# Patient Record
Sex: MALE | Race: WHITE | NOT HISPANIC OR LATINO | Employment: OTHER | URBAN - NONMETROPOLITAN AREA
[De-identification: names, ages, dates, MRNs, and addresses within clinical notes are randomized per-mention and may not be internally consistent; named-entity substitution may affect disease eponyms.]

---

## 2017-02-24 ENCOUNTER — APPOINTMENT (OUTPATIENT)
Dept: GENERAL RADIOLOGY | Facility: HOSPITAL | Age: 42
End: 2017-02-24

## 2017-02-24 ENCOUNTER — HOSPITAL ENCOUNTER (EMERGENCY)
Facility: HOSPITAL | Age: 42
Discharge: HOME | End: 2017-02-24
Attending: STUDENT IN AN ORGANIZED HEALTH CARE EDUCATION/TRAINING PROGRAM | Admitting: STUDENT IN AN ORGANIZED HEALTH CARE EDUCATION/TRAINING PROGRAM

## 2017-02-24 VITALS
DIASTOLIC BLOOD PRESSURE: 93 MMHG | HEART RATE: 75 BPM | OXYGEN SATURATION: 95 % | RESPIRATION RATE: 18 BRPM | SYSTOLIC BLOOD PRESSURE: 146 MMHG | WEIGHT: 200 LBS | TEMPERATURE: 98.4 F | BODY MASS INDEX: 32.14 KG/M2 | HEIGHT: 66 IN

## 2017-02-24 DIAGNOSIS — G57.93 NEUROPATHIC PAIN OF BOTH FEET: Primary | ICD-10-CM

## 2017-02-24 LAB
ALBUMIN SERPL-MCNC: 4.5 G/DL (ref 3.5–5)
ALBUMIN/GLOB SERPL: 1.4 G/DL (ref 1–2)
ALP SERPL-CCNC: 70 U/L (ref 38–126)
ALT SERPL W P-5'-P-CCNC: 33 U/L (ref 13–69)
ANION GAP SERPL CALCULATED.3IONS-SCNC: 15.1 MMOL/L
AST SERPL-CCNC: 20 U/L (ref 15–46)
BASOPHILS # BLD AUTO: 0.05 10*3/MM3 (ref 0–0.2)
BASOPHILS NFR BLD AUTO: 0.4 % (ref 0–2.5)
BILIRUB SERPL-MCNC: 0.5 MG/DL (ref 0.2–1.3)
BUN BLD-MCNC: 21 MG/DL (ref 7–20)
BUN/CREAT SERPL: 19.1 (ref 6.3–21.9)
CALCIUM SPEC-SCNC: 9.4 MG/DL (ref 8.4–10.2)
CHLORIDE SERPL-SCNC: 107 MMOL/L (ref 98–107)
CO2 SERPL-SCNC: 24 MMOL/L (ref 26–30)
CREAT BLD-MCNC: 1.1 MG/DL (ref 0.6–1.3)
DEPRECATED RDW RBC AUTO: 44.7 FL (ref 37–54)
EOSINOPHIL # BLD AUTO: 0.28 10*3/MM3 (ref 0–0.7)
EOSINOPHIL NFR BLD AUTO: 2.3 % (ref 0–7)
ERYTHROCYTE [DISTWIDTH] IN BLOOD BY AUTOMATED COUNT: 14.5 % (ref 11.5–14.5)
GFR SERPL CREATININE-BSD FRML MDRD: 74 ML/MIN/1.73
GLOBULIN UR ELPH-MCNC: 3.2 GM/DL
GLUCOSE BLD-MCNC: 104 MG/DL (ref 74–98)
HCT VFR BLD AUTO: 51.8 % (ref 42–52)
HGB BLD-MCNC: 17.9 G/DL (ref 14–18)
IMM GRANULOCYTES # BLD: 0.08 10*3/MM3 (ref 0–0.06)
IMM GRANULOCYTES NFR BLD: 0.7 % (ref 0–0.6)
LYMPHOCYTES # BLD AUTO: 1.79 10*3/MM3 (ref 0.6–3.4)
LYMPHOCYTES NFR BLD AUTO: 14.9 % (ref 10–50)
MCH RBC QN AUTO: 29.4 PG (ref 27–31)
MCHC RBC AUTO-ENTMCNC: 34.6 G/DL (ref 30–37)
MCV RBC AUTO: 85.2 FL (ref 80–94)
MONOCYTES # BLD AUTO: 0.65 10*3/MM3 (ref 0–0.9)
MONOCYTES NFR BLD AUTO: 5.4 % (ref 0–12)
NEUTROPHILS # BLD AUTO: 9.15 10*3/MM3 (ref 2–6.9)
NEUTROPHILS NFR BLD AUTO: 76.3 % (ref 37–80)
NRBC BLD MANUAL-RTO: 0 /100 WBC (ref 0–0)
PLATELET # BLD AUTO: 187 10*3/MM3 (ref 130–400)
PMV BLD AUTO: 9.7 FL (ref 6–12)
POTASSIUM BLD-SCNC: 4.1 MMOL/L (ref 3.5–5.1)
PROT SERPL-MCNC: 7.7 G/DL (ref 6.3–8.2)
RBC # BLD AUTO: 6.08 10*6/MM3 (ref 4.7–6.1)
SODIUM BLD-SCNC: 142 MMOL/L (ref 137–145)
WBC NRBC COR # BLD: 12 10*3/MM3 (ref 4.8–10.8)

## 2017-02-24 PROCEDURE — 71020 HC CHEST PA AND LATERAL: CPT

## 2017-02-24 PROCEDURE — 85025 COMPLETE CBC W/AUTO DIFF WBC: CPT | Performed by: PHYSICIAN ASSISTANT

## 2017-02-24 PROCEDURE — 73630 X-RAY EXAM OF FOOT: CPT

## 2017-02-24 PROCEDURE — 80053 COMPREHEN METABOLIC PANEL: CPT | Performed by: PHYSICIAN ASSISTANT

## 2017-02-24 PROCEDURE — 99284 EMERGENCY DEPT VISIT MOD MDM: CPT

## 2017-02-24 RX ORDER — CARVEDILOL 25 MG/1
25 TABLET ORAL 2 TIMES DAILY WITH MEALS
COMMUNITY
End: 2017-04-17 | Stop reason: SDUPTHER

## 2017-02-24 RX ORDER — HYDROCODONE BITARTRATE AND ACETAMINOPHEN 7.5; 325 MG/1; MG/1
1 TABLET ORAL ONCE
Status: COMPLETED | OUTPATIENT
Start: 2017-02-24 | End: 2017-02-24

## 2017-02-24 RX ORDER — AMLODIPINE BESYLATE 10 MG/1
10 TABLET ORAL DAILY
COMMUNITY
End: 2017-04-17 | Stop reason: SDUPTHER

## 2017-02-24 RX ORDER — ONDANSETRON 4 MG/1
4 TABLET, ORALLY DISINTEGRATING ORAL ONCE
Status: COMPLETED | OUTPATIENT
Start: 2017-02-24 | End: 2017-02-24

## 2017-02-24 RX ORDER — CYCLOBENZAPRINE HCL 5 MG
5 TABLET ORAL NIGHTLY PRN
Qty: 7 TABLET | Refills: 0 | Status: SHIPPED | OUTPATIENT
Start: 2017-02-24 | End: 2017-10-02 | Stop reason: HOSPADM

## 2017-02-24 RX ORDER — HYDROCHLOROTHIAZIDE 12.5 MG/1
12.5 TABLET ORAL 2 TIMES DAILY
COMMUNITY
End: 2017-03-02

## 2017-02-24 RX ORDER — CLONIDINE HYDROCHLORIDE 0.1 MG/1
0.1 TABLET ORAL ONCE
Status: COMPLETED | OUTPATIENT
Start: 2017-02-24 | End: 2017-02-24

## 2017-02-24 RX ORDER — LISINOPRIL 20 MG/1
20 TABLET ORAL DAILY
COMMUNITY
End: 2017-04-17 | Stop reason: SDUPTHER

## 2017-02-24 RX ORDER — SENNOSIDES 8.6 MG
650 CAPSULE ORAL EVERY 8 HOURS PRN
Qty: 12 TABLET | Refills: 0 | Status: SHIPPED | OUTPATIENT
Start: 2017-02-24 | End: 2017-10-02

## 2017-02-24 RX ADMIN — HYDROCODONE BITARTRATE AND ACETAMINOPHEN 1 TABLET: 7.5; 325 TABLET ORAL at 10:36

## 2017-02-24 RX ADMIN — CLONIDINE HYDROCHLORIDE 0.1 MG: 0.1 TABLET ORAL at 11:24

## 2017-02-24 RX ADMIN — ONDANSETRON 4 MG: 4 TABLET, ORALLY DISINTEGRATING ORAL at 10:36

## 2017-03-02 ENCOUNTER — OFFICE VISIT (OUTPATIENT)
Dept: INTERNAL MEDICINE | Facility: CLINIC | Age: 42
End: 2017-03-02

## 2017-03-02 VITALS
HEART RATE: 71 BPM | TEMPERATURE: 97.6 F | BODY MASS INDEX: 33.11 KG/M2 | OXYGEN SATURATION: 97 % | WEIGHT: 206 LBS | HEIGHT: 66 IN | DIASTOLIC BLOOD PRESSURE: 98 MMHG | SYSTOLIC BLOOD PRESSURE: 142 MMHG

## 2017-03-02 DIAGNOSIS — R73.9 HYPERGLYCEMIA: ICD-10-CM

## 2017-03-02 DIAGNOSIS — R53.83 OTHER FATIGUE: ICD-10-CM

## 2017-03-02 DIAGNOSIS — Z11.4 ENCOUNTER FOR SCREENING FOR HIV: ICD-10-CM

## 2017-03-02 DIAGNOSIS — G62.9 NEUROPATHY: ICD-10-CM

## 2017-03-02 DIAGNOSIS — F41.9 ANXIETY DISORDER, UNSPECIFIED TYPE: ICD-10-CM

## 2017-03-02 DIAGNOSIS — H53.9 VISION DISTURBANCE: Primary | ICD-10-CM

## 2017-03-02 DIAGNOSIS — M79.2 NEUROPATHIC PAIN: ICD-10-CM

## 2017-03-02 DIAGNOSIS — R79.89 ABNORMAL CBC: ICD-10-CM

## 2017-03-02 DIAGNOSIS — Z92.89 HISTORY OF TRANSFUSION: ICD-10-CM

## 2017-03-02 DIAGNOSIS — G89.4 CHRONIC PAIN SYNDROME: ICD-10-CM

## 2017-03-02 PROCEDURE — 36415 COLL VENOUS BLD VENIPUNCTURE: CPT | Performed by: FAMILY MEDICINE

## 2017-03-02 PROCEDURE — 99204 OFFICE O/P NEW MOD 45 MIN: CPT | Performed by: FAMILY MEDICINE

## 2017-03-02 RX ORDER — HYDROCHLOROTHIAZIDE 25 MG/1
25 TABLET ORAL DAILY
COMMUNITY
End: 2017-12-21

## 2017-03-02 RX ORDER — LISINOPRIL 40 MG/1
TABLET ORAL
COMMUNITY
Start: 2016-12-21 | End: 2017-04-17

## 2017-03-02 NOTE — PROGRESS NOTES
"Subjective   Tesfaye Ang is a 41 y.o. male.     History of Present Illness   Patient went to ER last week due to swelling in feet. Pain started one week ago on Thursday. By , he got up from his desk and burst into chairs as it felt like pins, needles, razor blades. ER said he's devleoping neuropathy but not diabetic. Has had swelling in right foot for a year, goes down overnight. Left started swelling a month ago. He does not think he's been checked for a clot.  No known history of vitamin B12 deficiency.  He has a long-term smoker, has smoked for 26 years currently smoking half pack per day.  He would like to quit but he finds it helps with his anxiety.  Patient has undergone blood transfusion in the past.    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  Had many hernia surgeries to his abdomen.    Patient wants to be honest and upfront and tells me he's been kicked out of 2 pain clinics.  He suffers from chronic pain in his lower back as well as his abdomen.  He was kicked out of one clinic as he was confused on what today's appointment was, he thought it was on the second and it was on the first.  His other pain clinic he was discharged from as he could not come in for a pill count.  He was in [place] for a .  He is interested in getting another referral for his chronic pain.  He also states that he has \"done every drug under the sun\" but no longer abuses drugs other than an occasional marijuana use.  Marijuana does seem to help with his pain.    Strong family history of coronary artery disease and peripheral vascular disease.  Dad  at age 39 from a heart attack, mother  at 56.  Mother also had a stroke while she was pregnant with patient.  Patient reports his first heart attack was at age 29.    The following portions of the patient's " history were reviewed and updated as appropriate: allergies, current medications, past family history, past medical history, past social history, past surgical history and problem list.    Review of Systems   Eyes: Positive for visual disturbance (due for an exam and he asked that I get this set up).   Respiratory: Positive for cough and wheezing. Negative for shortness of breath.    Cardiovascular: Positive for leg swelling (swollen ankles).   Gastrointestinal: Positive for abdominal pain and nausea.   Musculoskeletal: Positive for arthralgias, gait problem (uses cane) and joint swelling. Negative for myalgias.        Chronic pain   Psychiatric/Behavioral: The patient is nervous/anxious.        Objective   Physical Exam   Constitutional: He is oriented to person, place, and time. Vital signs are normal. He appears well-developed and well-nourished. He is active. He does not have a sickly appearance. He does not appear ill.   Appears stated age. Well groomed. Obese     HENT:   Head: Normocephalic and atraumatic. 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   Right Ear: Hearing normal.   Left Ear: Hearing normal.   Nose: Nose normal.   Mouth/Throat: Abnormal dentition.   Eyes: EOM and lids are normal. Pupils are equal, round, and reactive to light. No scleral icterus.   Neck: Phonation normal. Neck supple.   Cardiovascular: Normal rate, regular rhythm and normal heart sounds.    Pulmonary/Chest: Effort normal and breath sounds normal.   Abdominal: Soft. Bowel sounds are normal. There is generalized tenderness. There is no rigidity and no rebound.       Neurological: He is alert and oriented to person, place, and time. He displays no tremor. No cranial nerve deficit. Gait (walks with cane) abnormal.   Skin: Skin is warm. He is not diaphoretic. No cyanosis. Nails show no clubbing.   Psychiatric: He has a normal mood and affect. His speech is normal and behavior is normal. Judgment and thought content normal. Cognition and  memory are normal.   Nursing note and vitals reviewed.       Admission on 02/24/2017, Discharged on 02/24/2017   Component Date Value Ref Range Status   • Glucose 02/24/2017 104* 74 - 98 mg/dL Final   • BUN 02/24/2017 21* 7 - 20 mg/dL Final   • Creatinine 02/24/2017 1.10  0.60 - 1.30 mg/dL Final   • Sodium 02/24/2017 142  137 - 145 mmol/L Final   • Potassium 02/24/2017 4.1  3.5 - 5.1 mmol/L Final   • Chloride 02/24/2017 107  98 - 107 mmol/L Final   • CO2 02/24/2017 24.0* 26.0 - 30.0 mmol/L Final   • Calcium 02/24/2017 9.4  8.4 - 10.2 mg/dL Final   • Total Protein 02/24/2017 7.7  6.3 - 8.2 g/dL Final   • Albumin 02/24/2017 4.50  3.50 - 5.00 g/dL Final   • ALT (SGPT) 02/24/2017 33  13 - 69 U/L Final   • AST (SGOT) 02/24/2017 20  15 - 46 U/L Final   • Alkaline Phosphatase 02/24/2017 70  38 - 126 U/L Final   • Total Bilirubin 02/24/2017 0.5  0.2 - 1.3 mg/dL Final   • eGFR Non  Amer 02/24/2017 74  >60 mL/min/1.73 Final   • Globulin 02/24/2017 3.2  gm/dL Final   • A/G Ratio 02/24/2017 1.4  1.0 - 2.0 g/dL Final   • BUN/Creatinine Ratio 02/24/2017 19.1  6.3 - 21.9 Final   • Anion Gap 02/24/2017 15.1  mmol/L Final   • WBC 02/24/2017 12.00* 4.80 - 10.80 10*3/mm3 Final   • RBC 02/24/2017 6.08  4.70 - 6.10 10*6/mm3 Final   • Hemoglobin 02/24/2017 17.9  14.0 - 18.0 g/dL Final   • Hematocrit 02/24/2017 51.8  42.0 - 52.0 % Final   • MCV 02/24/2017 85.2  80.0 - 94.0 fL Final   • MCH 02/24/2017 29.4  27.0 - 31.0 pg Final   • MCHC 02/24/2017 34.6  30.0 - 37.0 g/dL Final   • RDW 02/24/2017 14.5  11.5 - 14.5 % Final   • RDW-SD 02/24/2017 44.7  37.0 - 54.0 fl Final   • MPV 02/24/2017 9.7  6.0 - 12.0 fL Final   • Platelets 02/24/2017 187  130 - 400 10*3/mm3 Final   • Neutrophil % 02/24/2017 76.3  37.0 - 80.0 % Final   • Lymphocyte % 02/24/2017 14.9  10.0 - 50.0 % Final   • Monocyte % 02/24/2017 5.4  0.0 - 12.0 % Final   • Eosinophil % 02/24/2017 2.3  0.0 - 7.0 % Final   • Basophil % 02/24/2017 0.4  0.0 - 2.5 % Final   • Immature  Grans % 02/24/2017 0.7* 0.0 - 0.6 % Final   • Neutrophils, Absolute 02/24/2017 9.15* 2.00 - 6.90 10*3/mm3 Final   • Lymphocytes, Absolute 02/24/2017 1.79  0.60 - 3.40 10*3/mm3 Final   • Monocytes, Absolute 02/24/2017 0.65  0.00 - 0.90 10*3/mm3 Final   • Eosinophils, Absolute 02/24/2017 0.28  0.00 - 0.70 10*3/mm3 Final   • Basophils, Absolute 02/24/2017 0.05  0.00 - 0.20 10*3/mm3 Final   • Immature Grans, Absolute 02/24/2017 0.08* 0.00 - 0.06 10*3/mm3 Final   • nRBC 02/24/2017 0.0  0.0 - 0.0 /100 WBC Final         Assessment/Plan   Tesfaye was seen today for establish care and foot swelling.    Diagnoses and all orders for this visit:    Vision disturbance  -     Ambulatory Referral to Optometry    Neuropathy  -     Comprehensive Metabolic Panel; Future  -     Hepatitis Panel, Acute; Future  -     TSH; Future  -     T4, Free; Future  -     Vitamin B12 & Folate; Future  -     HIV-1 / O / 2 Ag / Antibody 4th Generation; Future  -     RPR; Future  -     Protein Elec + Interp, Serum; Future  -     CONNER; Future  -     Sedimentation Rate; Future  -     Hemoglobin A1c; Future  -     Protein Elec + Interp, Serum  -     Comprehensive Metabolic Panel  -     Hepatitis Panel, Acute  -     TSH  -     T4, Free  -     Vitamin B12 & Folate  -     HIV-1 / O / 2 Ag / Antibody 4th Generation  -     RPR  -     CONNER  -     Sedimentation Rate  -     Hemoglobin A1c    Neuropathic pain    Abnormal CBC  -     CBC & Differential; Future  -     CBC & Differential    History of transfusion  -     Hepatitis Panel, Acute; Future  -     HIV-1 / O / 2 Ag / Antibody 4th Generation; Future  -     Hepatitis Panel, Acute  -     HIV-1 / O / 2 Ag / Antibody 4th Generation    Other fatigue   -     Hepatitis Panel, Acute; Future  -     Hepatitis Panel, Acute    Anxiety disorder, unspecified type   -     TSH; Future  -     T4, Free; Future  -     TSH  -     T4, Free    Encounter for screening for HIV   -     HIV-1 / O / 2 Ag / Antibody 4th Generation;  Future  -     HIV-1 / O / 2 Ag / Antibody 4th Generation    Hyperglycemia   -     Hemoglobin A1c; Future  -     Hemoglobin A1c    Chronic pain syndrome  -     Ambulatory Referral to Pain Management      Records requested from previous PCP. Explained I need to refer him to pain management for his pain control.  Can further discuss anxiety at a future visit.  Wellbutrin may be a good option as it may help with smoking cessation as well.  Patient is aware of HIV testing due to neuropathy.

## 2017-03-09 LAB
ALBUMIN SERPL ELPH-MCNC: 4.2 G/DL (ref 2.9–4.4)
ALBUMIN SERPL-MCNC: 4.8 G/DL (ref 3.2–4.8)
ALBUMIN/GLOB SERPL: 1.1 {RATIO} (ref 0.7–1.7)
ALBUMIN/GLOB SERPL: 1.5 G/DL (ref 1.5–2.5)
ALP SERPL-CCNC: 74 U/L (ref 25–100)
ALPHA1 GLOB SERPL ELPH-MCNC: 0.3 G/DL (ref 0–0.4)
ALPHA2 GLOB SERPL ELPH-MCNC: 1.1 G/DL (ref 0.4–1)
ALT SERPL-CCNC: 24 U/L (ref 7–40)
ANA SER QL: NEGATIVE
AST SERPL-CCNC: 17 U/L (ref 0–33)
B-GLOBULIN SERPL ELPH-MCNC: 1.3 G/DL (ref 0.7–1.3)
BASOPHILS # BLD AUTO: 0.03 10*3/MM3 (ref 0–0.2)
BASOPHILS NFR BLD AUTO: 0.3 % (ref 0–1)
BILIRUB SERPL-MCNC: 0.4 MG/DL (ref 0.3–1.2)
BUN SERPL-MCNC: 19 MG/DL (ref 9–23)
BUN/CREAT SERPL: 21.1 (ref 7–25)
CALCIUM SERPL-MCNC: 10.1 MG/DL (ref 8.7–10.4)
CHLORIDE SERPL-SCNC: 100 MMOL/L (ref 99–109)
CO2 SERPL-SCNC: 20 MMOL/L (ref 20–31)
CREAT SERPL-MCNC: 0.9 MG/DL (ref 0.6–1.3)
EOSINOPHIL # BLD AUTO: 0.27 10*3/MM3 (ref 0.1–0.3)
EOSINOPHIL NFR BLD AUTO: 2.7 % (ref 0–3)
ERYTHROCYTE [DISTWIDTH] IN BLOOD BY AUTOMATED COUNT: 15.3 % (ref 11.3–14.5)
ERYTHROCYTE [SEDIMENTATION RATE] IN BLOOD BY WESTERGREN METHOD: 27 MM/HR (ref 0–15)
FOLATE SERPL-MCNC: NORMAL NG/ML
GAMMA GLOB SERPL ELPH-MCNC: 1 G/DL (ref 0.4–1.8)
GLOBULIN SER CALC-MCNC: 3.1 GM/DL
GLOBULIN SER CALC-MCNC: 3.7 G/DL (ref 2.2–3.9)
GLUCOSE SERPL-MCNC: 76 MG/DL (ref 70–100)
HAV IGM SERPL QL IA: NEGATIVE
HBA1C MFR BLD: 5.3 % (ref 4.8–5.6)
HBV CORE IGM SERPL QL IA: NEGATIVE
HBV SURFACE AG SERPL QL IA: NEGATIVE
HCT VFR BLD AUTO: 57.7 % (ref 38.9–50.9)
HCV AB S/CO SERPL IA: 0.9 S/CO RATIO (ref 0–0.9)
HGB BLD-MCNC: 18.9 G/DL (ref 13.1–17.5)
HIV 1+2 AB+HIV1 P24 AG SERPL QL IA: NORMAL
IMM GRANULOCYTES # BLD: 0.07 10*3/MM3 (ref 0–0.03)
IMM GRANULOCYTES NFR BLD: 0.7 % (ref 0–0.6)
LYMPHOCYTES # BLD AUTO: 1.81 10*3/MM3 (ref 0.6–4.8)
LYMPHOCYTES NFR BLD AUTO: 18.4 % (ref 24–44)
Lab: ABNORMAL
M PROTEIN SERPL ELPH-MCNC: ABNORMAL G/DL
MCH RBC QN AUTO: 28.9 PG (ref 27–31)
MCHC RBC AUTO-ENTMCNC: 32.8 G/DL (ref 32–36)
MCV RBC AUTO: 88.4 FL (ref 80–99)
MONOCYTES # BLD AUTO: 0.87 10*3/MM3 (ref 0–1)
MONOCYTES NFR BLD AUTO: 8.8 % (ref 0–12)
NEUTROPHILS # BLD AUTO: 6.8 10*3/MM3 (ref 1.5–8.3)
NEUTROPHILS NFR BLD AUTO: 69.1 % (ref 41–71)
PLATELET # BLD AUTO: 223 10*3/MM3 (ref 150–450)
POTASSIUM SERPL-SCNC: 4.5 MMOL/L (ref 3.5–5.5)
PROT PATTERN SERPL ELPH-IMP: ABNORMAL
PROT SERPL-MCNC: 7.9 G/DL (ref 5.7–8.2)
RBC # BLD AUTO: 6.53 10*6/MM3 (ref 4.2–5.76)
REQUEST PROBLEM: NORMAL
REQUEST PROBLEM: NORMAL
RPR SER QL: NON REACTIVE
SODIUM SERPL-SCNC: 136 MMOL/L (ref 132–146)
T4 FREE SERPL-MCNC: 1.28 NG/DL (ref 0.89–1.76)
TSH SERPL DL<=0.005 MIU/L-ACNC: NORMAL MIU/ML
VIT B12 SERPL-MCNC: NORMAL PG/ML
WBC # BLD AUTO: 9.85 10*3/MM3 (ref 3.5–10.8)

## 2017-03-12 ENCOUNTER — HOSPITAL ENCOUNTER (EMERGENCY)
Facility: HOSPITAL | Age: 42
Discharge: HOME | End: 2017-03-12
Attending: EMERGENCY MEDICINE | Admitting: EMERGENCY MEDICINE

## 2017-03-12 VITALS
BODY MASS INDEX: 33.75 KG/M2 | OXYGEN SATURATION: 93 % | TEMPERATURE: 98.2 F | RESPIRATION RATE: 18 BRPM | SYSTOLIC BLOOD PRESSURE: 153 MMHG | WEIGHT: 210 LBS | HEIGHT: 66 IN | DIASTOLIC BLOOD PRESSURE: 101 MMHG | HEART RATE: 84 BPM

## 2017-03-12 DIAGNOSIS — L02.415 ABSCESS OF RIGHT LEG: Primary | ICD-10-CM

## 2017-03-12 PROCEDURE — 99283 EMERGENCY DEPT VISIT LOW MDM: CPT

## 2017-03-12 RX ORDER — LIDOCAINE HYDROCHLORIDE 20 MG/ML
10 INJECTION, SOLUTION INFILTRATION; PERINEURAL ONCE
Status: COMPLETED | OUTPATIENT
Start: 2017-03-12 | End: 2017-03-12

## 2017-03-12 RX ORDER — SULFAMETHOXAZOLE AND TRIMETHOPRIM 800; 160 MG/1; MG/1
1 TABLET ORAL 2 TIMES DAILY
Qty: 14 TABLET | Refills: 0 | Status: SHIPPED | OUTPATIENT
Start: 2017-03-12 | End: 2017-09-20 | Stop reason: HOSPADM

## 2017-03-12 RX ADMIN — LIDOCAINE HYDROCHLORIDE 10 ML: 20 INJECTION, SOLUTION INFILTRATION; PERINEURAL at 20:32

## 2017-03-13 NOTE — ED PROVIDER NOTES
Subjective   HPI Comments: Patient discovered a painful large pustule in the right inner thigh now draining purulent material.  Wife attempt to manipulate the lesion yesterday, making it worse today.  No fever or illness         History provided by:  Patient   used: No        Review of Systems   Constitutional: Negative.  Negative for diaphoresis and fever.   HENT: Negative for sore throat.    Eyes: Negative.  Negative for pain and visual disturbance.   Respiratory: Negative for cough, shortness of breath, wheezing and stridor.    Cardiovascular: Negative.  Negative for chest pain.   Gastrointestinal: Negative.  Negative for abdominal pain, diarrhea, nausea and vomiting.   Endocrine: Negative.    Genitourinary: Negative.  Negative for dysuria.   Musculoskeletal: Negative.  Negative for back pain and neck pain.   Skin: Positive for wound. Negative for pallor and rash.   Allergic/Immunologic: Negative.    Neurological: Negative.  Negative for syncope and headaches.   Hematological: Negative.    Psychiatric/Behavioral: Negative.  Negative for agitation.       Past Medical History   Diagnosis Date   • Arthritis    • CHF (congestive heart failure)    • Colon polyp    • Fractures    • Heart attack    • Heart disease    • Heart failure    • History of bilateral inguinal hernias    • Hx of transfusion of whole blood    • Hypertension    • Kidney stone    • Myocardial infarction    • Obesity    • Seizures    • Stroke      TIA       Allergies   Allergen Reactions   • Compazine [Prochlorperazine Edisylate] Anaphylaxis and Shortness Of Breath   • Gabapentin Other (See Comments)     seizures   • Toradol [Ketorolac Tromethamine] Hives and Shortness Of Breath   • Darvon [Propoxyphene] Other (See Comments) and Hives   • Vioxx [Rofecoxib] Other (See Comments)     Stomach bleeding  Stomach bleeding   • Butalbital-Aspirin-Caffeine Hives       Past Surgical History   Procedure Laterality Date   • Bowel resection      • Hernia repair  04/2016   • Cholecystectomy  12/2012   • Appendectomy  05/2012   • Revision / takedown colostomy         Family History   Problem Relation Age of Onset   • Heart attack Mother 56   • Hypertension Mother    • Migraines Mother    • Stroke Mother    • Heart attack Father 39   • Hypertension Father    • Arthritis Paternal Grandmother    • Arthritis Paternal Grandfather        Social History     Social History   • Marital status:      Spouse name: N/A   • Number of children: N/A   • Years of education: N/A     Social History Main Topics   • Smoking status: Current Every Day Smoker     Packs/day: 0.50     Types: Cigarettes   • Smokeless tobacco: None   • Alcohol use No      Comment: STOPPED 2001   • Drug use: No   • Sexual activity: Defer     Other Topics Concern   • None     Social History Narrative   • None           Objective   Physical Exam   Constitutional: He is oriented to person, place, and time. He appears well-developed.   HENT:   Head: Normocephalic.   Eyes: EOM are normal. Pupils are equal, round, and reactive to light.   Neck: Normal range of motion. Neck supple.   Cardiovascular: Normal rate and regular rhythm.    Pulmonary/Chest: Effort normal. He has no wheezes. He has no rales.   Abdominal: Soft. Bowel sounds are normal. He exhibits no distension. There is no rebound and no guarding.   Musculoskeletal: Normal range of motion. He exhibits no edema.   Neurological: He is alert and oriented to person, place, and time.   Skin: Skin is warm and dry.   Skin pustule on the right inner thigh with local tense nonfluctuant skin without induration.     Psychiatric: He has a normal mood and affect.   Nursing note and vitals reviewed.      Incision & Drainage  Date/Time: 3/12/2017 8:11 PM  Performed by: ALICIA PHAN  Authorized by: GEETA RAGSDALE     Consent:     Consent obtained:  Verbal    Consent given by:  Patient    Risks discussed:  Incomplete drainage  Location:     Type:   Abscess    Size:  1    Location:  Lower extremity (right thigh)  Pre-procedure details:     Skin preparation:  Chloraprep  Anesthesia (see MAR for exact dosages):     Anesthesia method:  Local infiltration  Procedure details:     Complexity:  Simple    Needle aspiration: no      Incision types:  Single straight    Incision depth:  Dermal    Scalpel blade:  11    Wound management:  Irrigated with saline    Drainage:  Bloody and purulent    Drainage amount:  Scant    Wound treatment:  Wound left open    Packing materials:  None  Post-procedure details:     Patient tolerance of procedure:  Tolerated well, no immediate complications             ED Course  ED Course                  MDM  Number of Diagnoses or Management Options  Abscess of right leg:       Final diagnoses:   Abscess of right leg            Mariaa Broussard, APRN  03/12/17 2231

## 2017-03-21 ENCOUNTER — RESULTS ENCOUNTER (OUTPATIENT)
Dept: INTERNAL MEDICINE | Facility: CLINIC | Age: 42
End: 2017-03-21

## 2017-03-21 DIAGNOSIS — Z92.89 HISTORY OF TRANSFUSION: ICD-10-CM

## 2017-03-21 DIAGNOSIS — Z11.4 ENCOUNTER FOR SCREENING FOR HIV: ICD-10-CM

## 2017-03-21 DIAGNOSIS — R53.83 OTHER FATIGUE: ICD-10-CM

## 2017-03-21 DIAGNOSIS — R79.89 ABNORMAL CBC: ICD-10-CM

## 2017-03-21 DIAGNOSIS — G62.9 NEUROPATHY: ICD-10-CM

## 2017-03-21 DIAGNOSIS — R79.89 ABNORMAL CBC: Primary | ICD-10-CM

## 2017-04-17 ENCOUNTER — TELEPHONE (OUTPATIENT)
Dept: INTERNAL MEDICINE | Facility: CLINIC | Age: 42
End: 2017-04-17

## 2017-04-17 RX ORDER — AMLODIPINE BESYLATE 10 MG/1
10 TABLET ORAL DAILY
Qty: 10 TABLET | Refills: 0 | Status: SHIPPED | OUTPATIENT
Start: 2017-04-17 | End: 2017-09-20 | Stop reason: HOSPADM

## 2017-04-17 RX ORDER — LISINOPRIL 20 MG/1
20 TABLET ORAL DAILY
Qty: 10 TABLET | Refills: 0 | Status: SHIPPED | OUTPATIENT
Start: 2017-04-17 | End: 2017-04-17 | Stop reason: SDUPTHER

## 2017-04-17 RX ORDER — CARVEDILOL 25 MG/1
25 TABLET ORAL 2 TIMES DAILY WITH MEALS
Qty: 60 TABLET | Refills: 11 | Status: SHIPPED | OUTPATIENT
Start: 2017-04-17 | End: 2017-09-20 | Stop reason: HOSPADM

## 2017-04-17 RX ORDER — LISINOPRIL 20 MG/1
20 TABLET ORAL DAILY
Qty: 30 TABLET | Refills: 11 | Status: SHIPPED | OUTPATIENT
Start: 2017-04-17 | End: 2017-09-20 | Stop reason: HOSPADM

## 2017-04-17 RX ORDER — CARVEDILOL 25 MG/1
25 TABLET ORAL 2 TIMES DAILY WITH MEALS
Qty: 20 TABLET | Refills: 0 | Status: SHIPPED | OUTPATIENT
Start: 2017-04-17 | End: 2017-04-17 | Stop reason: SDUPTHER

## 2017-04-17 RX ORDER — AMLODIPINE BESYLATE 10 MG/1
10 TABLET ORAL DAILY
Qty: 30 TABLET | Refills: 11 | Status: SHIPPED | OUTPATIENT
Start: 2017-04-17 | End: 2018-06-19 | Stop reason: SDUPTHER

## 2017-04-17 NOTE — TELEPHONE ENCOUNTER
PATIENT REQUESTING REFILL ON THE FOLLOWING MEDICATION: AMLODIPINE, CARVEDILOL, AND LISINOPRIL.    PATIENT USES MAIL ORDER FOR THESE MEDICATIONS, AND HE ONLY HAS 2 DAYS LEFT.    I ADVISED HIM I WOULD SEND A 10 DAY SUPPLY TO [facility], AND ALSO SEND IN REFILLS TO [facility].

## 2017-04-17 NOTE — TELEPHONE ENCOUNTER
----- Message from Yesi Gandhi sent at 4/17/2017 11:35 AM EDT -----  Contact: Patient  Patient called in regards to his blood pressure medicine. He said it's serious.    He'd like a callback.

## 2017-09-09 ENCOUNTER — HOSPITAL ENCOUNTER (INPATIENT)
Facility: HOSPITAL | Age: 42
LOS: 10 days | Discharge: HOME | End: 2017-09-20
Attending: STUDENT IN AN ORGANIZED HEALTH CARE EDUCATION/TRAINING PROGRAM | Admitting: INTERNAL MEDICINE

## 2017-09-09 ENCOUNTER — APPOINTMENT (OUTPATIENT)
Dept: GENERAL RADIOLOGY | Facility: HOSPITAL | Age: 42
End: 2017-09-09

## 2017-09-09 ENCOUNTER — APPOINTMENT (OUTPATIENT)
Dept: CT IMAGING | Facility: HOSPITAL | Age: 42
End: 2017-09-09

## 2017-09-09 DIAGNOSIS — J18.9 PNEUMONIA OF BOTH UPPER LOBES DUE TO INFECTIOUS ORGANISM: Primary | ICD-10-CM

## 2017-09-09 PROBLEM — R56.9 SEIZURES (HCC): Status: ACTIVE | Noted: 2017-09-09

## 2017-09-09 PROBLEM — I10 HYPERTENSION: Status: ACTIVE | Noted: 2017-09-09

## 2017-09-09 PROBLEM — J96.91 RESPIRATORY FAILURE WITH HYPOXIA (HCC): Status: ACTIVE | Noted: 2017-09-09

## 2017-09-09 PROBLEM — I21.9 MYOCARDIAL INFARCTION (HCC): Status: ACTIVE | Noted: 2017-09-09

## 2017-09-09 PROBLEM — J44.9 COPD (CHRONIC OBSTRUCTIVE PULMONARY DISEASE) (HCC): Status: ACTIVE | Noted: 2017-09-09

## 2017-09-09 LAB
ALBUMIN SERPL-MCNC: 4.4 G/DL (ref 3.5–5)
ALBUMIN/GLOB SERPL: 1.3 G/DL (ref 1–2)
ALP SERPL-CCNC: 68 U/L (ref 38–126)
ALT SERPL W P-5'-P-CCNC: 35 U/L (ref 13–69)
AMPHET+METHAMPHET UR QL: NEGATIVE
AMPHETAMINES UR QL: NEGATIVE
ANION GAP SERPL CALCULATED.3IONS-SCNC: 17.6 MMOL/L
AST SERPL-CCNC: 30 U/L (ref 15–46)
BACTERIA UR QL AUTO: ABNORMAL /HPF
BARBITURATES UR QL SCN: NEGATIVE
BASOPHILS # BLD AUTO: 0.06 10*3/MM3 (ref 0–0.2)
BASOPHILS NFR BLD AUTO: 0.4 % (ref 0–2.5)
BENZODIAZ UR QL SCN: NEGATIVE
BILIRUB SERPL-MCNC: 0.8 MG/DL (ref 0.2–1.3)
BILIRUB UR QL STRIP: ABNORMAL
BUN BLD-MCNC: 15 MG/DL (ref 7–20)
BUN/CREAT SERPL: 16.7 (ref 6.3–21.9)
BUPRENORPHINE SERPL-MCNC: NEGATIVE NG/ML
CALCIUM SPEC-SCNC: 9.3 MG/DL (ref 8.4–10.2)
CANNABINOIDS SERPL QL: NEGATIVE
CHLORIDE SERPL-SCNC: 107 MMOL/L (ref 98–107)
CLARITY UR: CLEAR
CO2 SERPL-SCNC: 19 MMOL/L (ref 26–30)
COCAINE UR QL: NEGATIVE
COLOR UR: YELLOW
CREAT BLD-MCNC: 0.9 MG/DL (ref 0.6–1.3)
D-LACTATE SERPL-SCNC: 1.1 MMOL/L (ref 0.5–2)
DEPRECATED RDW RBC AUTO: 44.3 FL (ref 37–54)
EOSINOPHIL # BLD AUTO: 0.05 10*3/MM3 (ref 0–0.7)
EOSINOPHIL NFR BLD AUTO: 0.3 % (ref 0–7)
ERYTHROCYTE [DISTWIDTH] IN BLOOD BY AUTOMATED COUNT: 15 % (ref 11.5–14.5)
GFR SERPL CREATININE-BSD FRML MDRD: 93 ML/MIN/1.73
GLOBULIN UR ELPH-MCNC: 3.3 GM/DL
GLUCOSE BLD-MCNC: 122 MG/DL (ref 74–98)
GLUCOSE UR STRIP-MCNC: NEGATIVE MG/DL
GRAN CASTS URNS QL MICRO: ABNORMAL /LPF
HCT VFR BLD AUTO: 51.8 % (ref 42–52)
HGB BLD-MCNC: 17.4 G/DL (ref 14–18)
HGB UR QL STRIP.AUTO: ABNORMAL
HOLD SPECIMEN: NORMAL
HOLD SPECIMEN: NORMAL
HYALINE CASTS UR QL AUTO: ABNORMAL /LPF
IMM GRANULOCYTES # BLD: 0.13 10*3/MM3 (ref 0–0.06)
IMM GRANULOCYTES NFR BLD: 0.8 % (ref 0–0.6)
KETONES UR QL STRIP: ABNORMAL
LEUKOCYTE ESTERASE UR QL STRIP.AUTO: NEGATIVE
LIPASE SERPL-CCNC: 31 U/L (ref 23–300)
LYMPHOCYTES # BLD AUTO: 1.51 10*3/MM3 (ref 0.6–3.4)
LYMPHOCYTES NFR BLD AUTO: 9 % (ref 10–50)
MCH RBC QN AUTO: 27.8 PG (ref 27–31)
MCHC RBC AUTO-ENTMCNC: 33.6 G/DL (ref 30–37)
MCV RBC AUTO: 82.7 FL (ref 80–94)
METHADONE UR QL SCN: NEGATIVE
MONOCYTES # BLD AUTO: 0.83 10*3/MM3 (ref 0–0.9)
MONOCYTES NFR BLD AUTO: 5 % (ref 0–12)
MUCOUS THREADS URNS QL MICRO: ABNORMAL /HPF
NEUTROPHILS # BLD AUTO: 14.14 10*3/MM3 (ref 2–6.9)
NEUTROPHILS NFR BLD AUTO: 84.5 % (ref 37–80)
NITRITE UR QL STRIP: NEGATIVE
NRBC BLD MANUAL-RTO: 0 /100 WBC (ref 0–0)
NT-PROBNP SERPL-MCNC: 557 PG/ML (ref 0–125)
OPIATES UR QL: POSITIVE
OVAL FAT BODIES #/AREA URNS AUTO: ABNORMAL /HPF
OXYCODONE UR QL SCN: NEGATIVE
PCP UR QL SCN: NEGATIVE
PH UR STRIP.AUTO: 5.5 [PH] (ref 5–8)
PLATELET # BLD AUTO: 188 10*3/MM3 (ref 130–400)
PMV BLD AUTO: 9.5 FL (ref 6–12)
POTASSIUM BLD-SCNC: 3.6 MMOL/L (ref 3.5–5.1)
PROPOXYPH UR QL: NEGATIVE
PROT SERPL-MCNC: 7.7 G/DL (ref 6.3–8.2)
PROT UR QL STRIP: ABNORMAL
RBC # BLD AUTO: 6.26 10*6/MM3 (ref 4.7–6.1)
RBC # UR: ABNORMAL /HPF
REF LAB TEST METHOD: ABNORMAL
SODIUM BLD-SCNC: 140 MMOL/L (ref 137–145)
SP GR UR STRIP: 1.02 (ref 1–1.03)
SQUAMOUS #/AREA URNS HPF: ABNORMAL /HPF
TRICYCLICS UR QL SCN: NEGATIVE
TROPONIN I SERPL-MCNC: 0.02 NG/ML (ref 0–0.03)
UROBILINOGEN UR QL STRIP: ABNORMAL
WBC NRBC COR # BLD: 16.72 10*3/MM3 (ref 4.8–10.8)
WBC UR QL AUTO: ABNORMAL /HPF
WHOLE BLOOD HOLD SPECIMEN: NORMAL
WHOLE BLOOD HOLD SPECIMEN: NORMAL

## 2017-09-09 PROCEDURE — 94799 UNLISTED PULMONARY SVC/PX: CPT

## 2017-09-09 PROCEDURE — 99285 EMERGENCY DEPT VISIT HI MDM: CPT

## 2017-09-09 PROCEDURE — G0378 HOSPITAL OBSERVATION PER HR: HCPCS

## 2017-09-09 PROCEDURE — 84484 ASSAY OF TROPONIN QUANT: CPT | Performed by: STUDENT IN AN ORGANIZED HEALTH CARE EDUCATION/TRAINING PROGRAM

## 2017-09-09 PROCEDURE — 63710000001 PREDNISONE PER 1 MG: Performed by: PHYSICIAN ASSISTANT

## 2017-09-09 PROCEDURE — 85025 COMPLETE CBC W/AUTO DIFF WBC: CPT | Performed by: STUDENT IN AN ORGANIZED HEALTH CARE EDUCATION/TRAINING PROGRAM

## 2017-09-09 PROCEDURE — 99219 PR INITIAL OBSERVATION CARE/DAY 50 MINUTES: CPT | Performed by: INTERNAL MEDICINE

## 2017-09-09 PROCEDURE — 25010000002 AZITHROMYCIN: Performed by: PHYSICIAN ASSISTANT

## 2017-09-09 PROCEDURE — 25010000002 METHYLPREDNISOLONE PER 125 MG: Performed by: PHYSICIAN ASSISTANT

## 2017-09-09 PROCEDURE — 80053 COMPREHEN METABOLIC PANEL: CPT | Performed by: STUDENT IN AN ORGANIZED HEALTH CARE EDUCATION/TRAINING PROGRAM

## 2017-09-09 PROCEDURE — 25010000002 CEFTRIAXONE: Performed by: PHYSICIAN ASSISTANT

## 2017-09-09 PROCEDURE — 81001 URINALYSIS AUTO W/SCOPE: CPT | Performed by: PHYSICIAN ASSISTANT

## 2017-09-09 PROCEDURE — 83880 ASSAY OF NATRIURETIC PEPTIDE: CPT | Performed by: STUDENT IN AN ORGANIZED HEALTH CARE EDUCATION/TRAINING PROGRAM

## 2017-09-09 PROCEDURE — 71275 CT ANGIOGRAPHY CHEST: CPT

## 2017-09-09 PROCEDURE — 94640 AIRWAY INHALATION TREATMENT: CPT

## 2017-09-09 PROCEDURE — 0 IOPAMIDOL 61 % SOLUTION: Performed by: STUDENT IN AN ORGANIZED HEALTH CARE EDUCATION/TRAINING PROGRAM

## 2017-09-09 PROCEDURE — 93005 ELECTROCARDIOGRAM TRACING: CPT

## 2017-09-09 PROCEDURE — 83605 ASSAY OF LACTIC ACID: CPT | Performed by: PHYSICIAN ASSISTANT

## 2017-09-09 PROCEDURE — 87040 BLOOD CULTURE FOR BACTERIA: CPT | Performed by: PHYSICIAN ASSISTANT

## 2017-09-09 PROCEDURE — 83690 ASSAY OF LIPASE: CPT | Performed by: PHYSICIAN ASSISTANT

## 2017-09-09 PROCEDURE — 71020 HC CHEST PA AND LATERAL: CPT

## 2017-09-09 PROCEDURE — 80306 DRUG TEST PRSMV INSTRMNT: CPT | Performed by: PHYSICIAN ASSISTANT

## 2017-09-09 RX ORDER — PREDNISONE 20 MG/1
40 TABLET ORAL ONCE
Status: COMPLETED | OUTPATIENT
Start: 2017-09-09 | End: 2017-09-09

## 2017-09-09 RX ORDER — METOPROLOL TARTRATE 5 MG/5ML
5 INJECTION INTRAVENOUS ONCE
Status: COMPLETED | OUTPATIENT
Start: 2017-09-09 | End: 2017-09-09

## 2017-09-09 RX ORDER — METOPROLOL TARTRATE 5 MG/5ML
5 INJECTION INTRAVENOUS ONCE
Status: DISCONTINUED | OUTPATIENT
Start: 2017-09-09 | End: 2017-09-09

## 2017-09-09 RX ORDER — SENNOSIDES 8.6 MG
650 CAPSULE ORAL EVERY 8 HOURS PRN
Status: DISCONTINUED | OUTPATIENT
Start: 2017-09-09 | End: 2017-09-16 | Stop reason: SDUPTHER

## 2017-09-09 RX ORDER — CARVEDILOL 25 MG/1
25 TABLET ORAL 2 TIMES DAILY WITH MEALS
Status: DISCONTINUED | OUTPATIENT
Start: 2017-09-09 | End: 2017-09-11

## 2017-09-09 RX ORDER — AMLODIPINE BESYLATE 5 MG/1
10 TABLET ORAL
Status: DISCONTINUED | OUTPATIENT
Start: 2017-09-09 | End: 2017-09-10 | Stop reason: ALTCHOICE

## 2017-09-09 RX ORDER — IPRATROPIUM BROMIDE AND ALBUTEROL SULFATE 2.5; .5 MG/3ML; MG/3ML
3 SOLUTION RESPIRATORY (INHALATION) ONCE
Status: COMPLETED | OUTPATIENT
Start: 2017-09-09 | End: 2017-09-09

## 2017-09-09 RX ORDER — AMLODIPINE BESYLATE 5 MG/1
10 TABLET ORAL DAILY
Status: DISCONTINUED | OUTPATIENT
Start: 2017-09-10 | End: 2017-09-20 | Stop reason: HOSPADM

## 2017-09-09 RX ORDER — ACETAMINOPHEN 325 MG/1
650 TABLET ORAL ONCE
Status: COMPLETED | OUTPATIENT
Start: 2017-09-09 | End: 2017-09-09

## 2017-09-09 RX ORDER — HYDROCHLOROTHIAZIDE 25 MG/1
25 TABLET ORAL DAILY
Status: DISCONTINUED | OUTPATIENT
Start: 2017-09-10 | End: 2017-09-20 | Stop reason: HOSPADM

## 2017-09-09 RX ORDER — METHYLPREDNISOLONE SODIUM SUCCINATE 125 MG/2ML
125 INJECTION, POWDER, LYOPHILIZED, FOR SOLUTION INTRAMUSCULAR; INTRAVENOUS ONCE
Status: COMPLETED | OUTPATIENT
Start: 2017-09-09 | End: 2017-09-09

## 2017-09-09 RX ORDER — SODIUM CHLORIDE 9 MG/ML
100 INJECTION, SOLUTION INTRAVENOUS CONTINUOUS
Status: DISCONTINUED | OUTPATIENT
Start: 2017-09-09 | End: 2017-09-10

## 2017-09-09 RX ORDER — SODIUM CHLORIDE 0.9 % (FLUSH) 0.9 %
10 SYRINGE (ML) INJECTION AS NEEDED
Status: DISCONTINUED | OUTPATIENT
Start: 2017-09-09 | End: 2017-09-20 | Stop reason: HOSPADM

## 2017-09-09 RX ORDER — LISINOPRIL 20 MG/1
40 TABLET ORAL DAILY
Status: DISCONTINUED | OUTPATIENT
Start: 2017-09-10 | End: 2017-09-20 | Stop reason: HOSPADM

## 2017-09-09 RX ORDER — CYCLOBENZAPRINE HCL 10 MG
5 TABLET ORAL NIGHTLY PRN
Status: DISCONTINUED | OUTPATIENT
Start: 2017-09-09 | End: 2017-09-20 | Stop reason: HOSPADM

## 2017-09-09 RX ORDER — ACETAMINOPHEN 325 MG/1
650 TABLET ORAL EVERY 4 HOURS PRN
Status: DISCONTINUED | OUTPATIENT
Start: 2017-09-09 | End: 2017-09-20 | Stop reason: HOSPADM

## 2017-09-09 RX ADMIN — CEFTRIAXONE 1 G: 1 INJECTION, POWDER, FOR SOLUTION INTRAMUSCULAR; INTRAVENOUS at 17:22

## 2017-09-09 RX ADMIN — AMLODIPINE BESYLATE 10 MG: 5 TABLET ORAL at 18:57

## 2017-09-09 RX ADMIN — SODIUM CHLORIDE 100 ML/HR: 9 INJECTION, SOLUTION INTRAVENOUS at 21:30

## 2017-09-09 RX ADMIN — PREDNISONE 40 MG: 20 TABLET ORAL at 15:11

## 2017-09-09 RX ADMIN — METOPROLOL TARTRATE 5 MG: 5 INJECTION INTRAVENOUS at 16:37

## 2017-09-09 RX ADMIN — METHYLPREDNISOLONE SODIUM SUCCINATE 125 MG: 125 INJECTION, POWDER, FOR SOLUTION INTRAMUSCULAR; INTRAVENOUS at 14:31

## 2017-09-09 RX ADMIN — CARVEDILOL 25 MG: 25 TABLET, FILM COATED ORAL at 21:30

## 2017-09-09 RX ADMIN — SODIUM CHLORIDE 1000 ML: 9 INJECTION, SOLUTION INTRAVENOUS at 15:12

## 2017-09-09 RX ADMIN — IOPAMIDOL 100 ML: 612 INJECTION, SOLUTION INTRAVENOUS at 16:12

## 2017-09-09 RX ADMIN — IPRATROPIUM BROMIDE AND ALBUTEROL SULFATE 3 ML: .5; 3 SOLUTION RESPIRATORY (INHALATION) at 14:34

## 2017-09-09 RX ADMIN — SODIUM CHLORIDE 1000 ML: 9 INJECTION, SOLUTION INTRAVENOUS at 14:33

## 2017-09-09 RX ADMIN — AZITHROMYCIN 500 MG: 500 INJECTION, POWDER, LYOPHILIZED, FOR SOLUTION INTRAVENOUS at 17:25

## 2017-09-09 RX ADMIN — ACETAMINOPHEN 650 MG: 325 TABLET, FILM COATED ORAL at 17:30

## 2017-09-09 NOTE — PLAN OF CARE
Problem: Fall Risk (Adult)  Goal: Identify Related Risk Factors and Signs and Symptoms  Outcome: Outcome(s) achieved Date Met:  09/09/17

## 2017-09-09 NOTE — PLAN OF CARE
Problem: Pneumonia (Adult)  Goal: Signs and Symptoms of Listed Potential Problems Will be Absent or Manageable (Pneumonia)  Outcome: Ongoing (interventions implemented as appropriate)    Problem: Fall Risk (Adult)  Goal: Absence of Falls  Outcome: Ongoing (interventions implemented as appropriate)

## 2017-09-09 NOTE — ED PROVIDER NOTES
Subjective   HPI Comments: Patient is here with complaint of increased cough congestion for the past 3-4 days fever/chills some nausea coughing up some thick phlegm denies abdominal pain no vomiting reported patient does not wear oxygen at home, he does have history of hypertension presents here via EMS with worsening symptoms today, here for further evaluation      Review of Systems   Constitutional: Positive for appetite change, chills and fever.   HENT: Positive for congestion and rhinorrhea. Negative for trouble swallowing.    Eyes: Negative.    Respiratory: Positive for cough and shortness of breath.    Cardiovascular: Negative.    Gastrointestinal: Positive for nausea. Negative for abdominal pain.   Genitourinary: Negative.    Musculoskeletal: Positive for arthralgias. Negative for neck pain and neck stiffness.   Skin: Negative.  Negative for rash.   Neurological: Negative.    Psychiatric/Behavioral: The patient is nervous/anxious.    All other systems reviewed and are negative.      Past Medical History:   Diagnosis Date   • Arthritis    • CHF (congestive heart failure)    • Colon polyp    • Fractures    • Heart attack    • Heart disease    • Heart failure    • History of bilateral inguinal hernias    • Hx of transfusion of whole blood    • Hypertension    • Kidney stone    • Myocardial infarction    • Obesity    • Seizures    • Stroke     TIA       Allergies   Allergen Reactions   • Compazine [Prochlorperazine Edisylate] Anaphylaxis and Shortness Of Breath   • Gabapentin Other (See Comments)     seizures   • Toradol [Ketorolac Tromethamine] Hives and Shortness Of Breath   • Darvon [Propoxyphene] Other (See Comments) and Hives   • Vioxx [Rofecoxib] Other (See Comments)     Stomach bleeding  Stomach bleeding   • Butalbital-Aspirin-Caffeine Hives       Past Surgical History:   Procedure Laterality Date   • APPENDECTOMY  05/2012   • CHOLECYSTECTOMY  12/2012   • COLON RESECTION     • HERNIA REPAIR  04/2016   •  REVISION / TAKEDOWN COLOSTOMY         Family History   Problem Relation Age of Onset   • Heart attack Mother 56   • Hypertension Mother    • Migraines Mother    • Stroke Mother    • Heart attack Father 39   • Hypertension Father    • Arthritis Paternal Grandmother    • Arthritis Paternal Grandfather        Social History     Social History   • Marital status:      Spouse name: N/A   • Number of children: N/A   • Years of education: N/A     Social History Main Topics   • Smoking status: Current Every Day Smoker     Packs/day: 0.50     Types: Cigarettes   • Smokeless tobacco: None   • Alcohol use No      Comment: STOPPED 2001   • Drug use: No   • Sexual activity: Defer     Other Topics Concern   • None     Social History Narrative           Objective   Physical Exam   Constitutional: He is oriented to person, place, and time. He appears well-developed and well-nourished.   Patient is nontoxic but does seem somewhat ill in appearance afebrile no acute distress   HENT:   Head: Normocephalic.   Mouth/Throat: Oropharynx is clear and moist.   Eyes: Conjunctivae and EOM are normal. Pupils are equal, round, and reactive to light.   Neck: Normal range of motion. Neck supple.   Cardiovascular: Normal heart sounds and intact distal pulses.    Tachycardic 129   Pulmonary/Chest: Effort normal. No respiratory distress. He has rales.   Abdominal: Soft. Bowel sounds are normal. There is no tenderness.   Musculoskeletal: Normal range of motion.   Neurological: He is alert and oriented to person, place, and time. No cranial nerve deficit.   Skin: Skin is warm and dry. No rash noted.   Psychiatric: He has a normal mood and affect. His behavior is normal. Judgment and thought content normal.   Nursing note and vitals reviewed.      Procedures         ED Course  ED Course   Comment By Time   Case labs management reviewed with Dr. Faiza Hamm PA-C 09/09 2931   Patient has been resting comfortably pending  CT report  Chris Hamm PA-C 09/09 1605   CT scan demonstrates bilateral perihilar groundglass airspace disease most consistent with pneumonia no evidence of PE or aortic dissection Chris Hamm PA-C 09/09 1642   Paged Dr Forest Hamm PA-C 09/09 1710                  MDM  Number of Diagnoses or Management Options  Pneumonia of both upper lobes due to infectious organism:      Amount and/or Complexity of Data Reviewed  Clinical lab tests: reviewed  Tests in the radiology section of CPT®: reviewed  Tests in the medicine section of CPT®: ordered  Discuss the patient with other providers: yes  Independent visualization of images, tracings, or specimens: yes    Risk of Complications, Morbidity, and/or Mortality  Presenting problems: moderate  Diagnostic procedures: moderate  Management options: moderate    Patient Progress  Patient progress: improved      Final diagnoses:   Pneumonia of both upper lobes due to infectious organism            Chris Hamm PA-C  09/09/17 1717       Chris Hamm PA-C  09/09/17 [id number]

## 2017-09-10 LAB
ALBUMIN SERPL-MCNC: 3.6 G/DL (ref 3.5–5)
ALBUMIN/GLOB SERPL: 1.2 G/DL (ref 1–2)
ALP SERPL-CCNC: 52 U/L (ref 38–126)
ALT SERPL W P-5'-P-CCNC: 35 U/L (ref 13–69)
ANION GAP SERPL CALCULATED.3IONS-SCNC: 12.9 MMOL/L
ARTERIAL PATENCY WRIST A: POSITIVE
AST SERPL-CCNC: 31 U/L (ref 15–46)
ATMOSPHERIC PRESS: 742 MMHG
BASE EXCESS BLDA CALC-SCNC: -3.3 MMOL/L
BASOPHILS # BLD AUTO: 0.02 10*3/MM3 (ref 0–0.2)
BASOPHILS NFR BLD AUTO: 0.1 % (ref 0–2.5)
BDY SITE: ABNORMAL
BILIRUB SERPL-MCNC: 0.3 MG/DL (ref 0.2–1.3)
BUN BLD-MCNC: 18 MG/DL (ref 7–20)
BUN/CREAT SERPL: 25.7 (ref 6.3–21.9)
CALCIUM SPEC-SCNC: 8.8 MG/DL (ref 8.4–10.2)
CHLORIDE SERPL-SCNC: 112 MMOL/L (ref 98–107)
CO2 SERPL-SCNC: 20 MMOL/L (ref 26–30)
CREAT BLD-MCNC: 0.7 MG/DL (ref 0.6–1.3)
DEPRECATED RDW RBC AUTO: 44.5 FL (ref 37–54)
EOSINOPHIL # BLD AUTO: 0 10*3/MM3 (ref 0–0.7)
EOSINOPHIL NFR BLD AUTO: 0 % (ref 0–7)
ERYTHROCYTE [DISTWIDTH] IN BLOOD BY AUTOMATED COUNT: 14.6 % (ref 11.5–14.5)
GFR SERPL CREATININE-BSD FRML MDRD: 124 ML/MIN/1.73
GLOBULIN UR ELPH-MCNC: 2.9 GM/DL
GLUCOSE BLD-MCNC: 138 MG/DL (ref 74–98)
HCO3 BLDA-SCNC: 21 MMOL/L (ref 22–28)
HCT VFR BLD AUTO: 46.7 % (ref 42–52)
HGB BLD-MCNC: 15.6 G/DL (ref 14–18)
HGB BLDA-MCNC: 16.3 G/DL (ref 12–18)
HOROWITZ INDEX BLD+IHG-RTO: 21 %
IMM GRANULOCYTES # BLD: 0.07 10*3/MM3 (ref 0–0.06)
IMM GRANULOCYTES NFR BLD: 0.4 % (ref 0–0.6)
LYMPHOCYTES # BLD AUTO: 0.89 10*3/MM3 (ref 0.6–3.4)
LYMPHOCYTES NFR BLD AUTO: 4.9 % (ref 10–50)
MCH RBC QN AUTO: 28 PG (ref 27–31)
MCHC RBC AUTO-ENTMCNC: 33.4 G/DL (ref 30–37)
MCV RBC AUTO: 83.7 FL (ref 80–94)
MODALITY: ABNORMAL
MONOCYTES # BLD AUTO: 0.59 10*3/MM3 (ref 0–0.9)
MONOCYTES NFR BLD AUTO: 3.2 % (ref 0–12)
NEUTROPHILS # BLD AUTO: 16.72 10*3/MM3 (ref 2–6.9)
NEUTROPHILS NFR BLD AUTO: 91.4 % (ref 37–80)
NRBC BLD MANUAL-RTO: 0 /100 WBC (ref 0–0)
PCO2 BLDA: 31.7 MM HG (ref 35–45)
PH BLDA: 7.43 PH UNITS
PLATELET # BLD AUTO: 174 10*3/MM3 (ref 130–400)
PMV BLD AUTO: 9.7 FL (ref 6–12)
PO2 BLDA: 59.1 MM HG (ref 75–100)
POTASSIUM BLD-SCNC: 3.9 MMOL/L (ref 3.5–5.1)
PROT SERPL-MCNC: 6.5 G/DL (ref 6.3–8.2)
RBC # BLD AUTO: 5.58 10*6/MM3 (ref 4.7–6.1)
SAO2 % BLDCOA: 90.8 %
SODIUM BLD-SCNC: 141 MMOL/L (ref 137–145)
WBC NRBC COR # BLD: 18.29 10*3/MM3 (ref 4.8–10.8)

## 2017-09-10 PROCEDURE — 94799 UNLISTED PULMONARY SVC/PX: CPT

## 2017-09-10 PROCEDURE — 94660 CPAP INITIATION&MGMT: CPT

## 2017-09-10 PROCEDURE — 99232 SBSQ HOSP IP/OBS MODERATE 35: CPT | Performed by: INTERNAL MEDICINE

## 2017-09-10 PROCEDURE — 25010000002 ENOXAPARIN PER 10 MG: Performed by: INTERNAL MEDICINE

## 2017-09-10 PROCEDURE — 82805 BLOOD GASES W/O2 SATURATION: CPT

## 2017-09-10 PROCEDURE — 25010000002 CEFTRIAXONE: Performed by: INTERNAL MEDICINE

## 2017-09-10 PROCEDURE — 25010000002 METHYLPREDNISOLONE PER 125 MG: Performed by: INTERNAL MEDICINE

## 2017-09-10 PROCEDURE — 36600 WITHDRAWAL OF ARTERIAL BLOOD: CPT

## 2017-09-10 PROCEDURE — 5A09357 ASSISTANCE WITH RESPIRATORY VENTILATION, LESS THAN 24 CONSECUTIVE HOURS, CONTINUOUS POSITIVE AIRWAY PRESSURE: ICD-10-PCS | Performed by: INTERNAL MEDICINE

## 2017-09-10 PROCEDURE — 85025 COMPLETE CBC W/AUTO DIFF WBC: CPT | Performed by: INTERNAL MEDICINE

## 2017-09-10 PROCEDURE — 80053 COMPREHEN METABOLIC PANEL: CPT | Performed by: INTERNAL MEDICINE

## 2017-09-10 PROCEDURE — 25010000002 AZITHROMYCIN: Performed by: INTERNAL MEDICINE

## 2017-09-10 RX ORDER — LISINOPRIL 20 MG/1
40 TABLET ORAL
Status: COMPLETED | OUTPATIENT
Start: 2017-09-10 | End: 2017-09-10

## 2017-09-10 RX ORDER — METHYLPREDNISOLONE SODIUM SUCCINATE 125 MG/2ML
80 INJECTION, POWDER, LYOPHILIZED, FOR SOLUTION INTRAMUSCULAR; INTRAVENOUS EVERY 6 HOURS
Status: DISCONTINUED | OUTPATIENT
Start: 2017-09-10 | End: 2017-09-11

## 2017-09-10 RX ORDER — IPRATROPIUM BROMIDE AND ALBUTEROL SULFATE 2.5; .5 MG/3ML; MG/3ML
3 SOLUTION RESPIRATORY (INHALATION)
Status: DISCONTINUED | OUTPATIENT
Start: 2017-09-10 | End: 2017-09-10

## 2017-09-10 RX ORDER — IPRATROPIUM BROMIDE AND ALBUTEROL SULFATE 2.5; .5 MG/3ML; MG/3ML
3 SOLUTION RESPIRATORY (INHALATION)
Status: DISCONTINUED | OUTPATIENT
Start: 2017-09-10 | End: 2017-09-11 | Stop reason: SDUPTHER

## 2017-09-10 RX ORDER — CLONIDINE HYDROCHLORIDE 0.2 MG/1
0.2 TABLET ORAL EVERY 12 HOURS SCHEDULED
Status: DISCONTINUED | OUTPATIENT
Start: 2017-09-10 | End: 2017-09-20 | Stop reason: HOSPADM

## 2017-09-10 RX ADMIN — METHYLPREDNISOLONE SODIUM SUCCINATE 80 MG: 125 INJECTION, POWDER, FOR SOLUTION INTRAMUSCULAR; INTRAVENOUS at 21:39

## 2017-09-10 RX ADMIN — LISINOPRIL 40 MG: 20 TABLET ORAL at 08:30

## 2017-09-10 RX ADMIN — IPRATROPIUM BROMIDE AND ALBUTEROL SULFATE 3 ML: .5; 3 SOLUTION RESPIRATORY (INHALATION) at 12:50

## 2017-09-10 RX ADMIN — AMLODIPINE BESYLATE 10 MG: 5 TABLET ORAL at 08:30

## 2017-09-10 RX ADMIN — HYDROCHLOROTHIAZIDE 25 MG: 25 TABLET ORAL at 08:29

## 2017-09-10 RX ADMIN — CYCLOBENZAPRINE HYDROCHLORIDE 5 MG: 10 TABLET, FILM COATED ORAL at 20:14

## 2017-09-10 RX ADMIN — CLONIDINE HYDROCHLORIDE 0.2 MG: 0.2 TABLET ORAL at 02:50

## 2017-09-10 RX ADMIN — CEFTRIAXONE 1 G: 1 INJECTION, POWDER, FOR SOLUTION INTRAMUSCULAR; INTRAVENOUS at 17:32

## 2017-09-10 RX ADMIN — AZITHROMYCIN 500 MG: 500 INJECTION, POWDER, LYOPHILIZED, FOR SOLUTION INTRAVENOUS at 18:33

## 2017-09-10 RX ADMIN — CARVEDILOL 25 MG: 25 TABLET, FILM COATED ORAL at 08:29

## 2017-09-10 RX ADMIN — ENOXAPARIN SODIUM 40 MG: 40 INJECTION SUBCUTANEOUS at 08:29

## 2017-09-10 RX ADMIN — IPRATROPIUM BROMIDE AND ALBUTEROL SULFATE 3 ML: .5; 3 SOLUTION RESPIRATORY (INHALATION) at 20:01

## 2017-09-10 RX ADMIN — SODIUM CHLORIDE 100 ML/HR: 9 INJECTION, SOLUTION INTRAVENOUS at 06:15

## 2017-09-10 RX ADMIN — CARVEDILOL 25 MG: 25 TABLET, FILM COATED ORAL at 19:43

## 2017-09-10 RX ADMIN — IPRATROPIUM BROMIDE AND ALBUTEROL SULFATE 3 ML: .5; 3 SOLUTION RESPIRATORY (INHALATION) at 23:28

## 2017-09-10 RX ADMIN — CLONIDINE HYDROCHLORIDE 0.2 MG: 0.2 TABLET ORAL at 20:13

## 2017-09-10 RX ADMIN — LISINOPRIL 40 MG: 20 TABLET ORAL at 01:40

## 2017-09-10 NOTE — PAYOR COMM NOTE
"  Inpatient notification and clinicals    Kirsten Ashford RN  [phone] fax    Tesfaye Busch (41 y.o. Male)     Date of Birth Social Security Number Address Home Phone MRN    1975  [id number] [place] [id number] [phone] [id number]    Restorationist Marital Status          None        Admission Date Admission Type Admitting Provider Attending Provider Department, Room/Bed    9/9/17 Emergency Edil Benson MD Shah, Gaurang B, MD [facility] MED SURG  3, 304/1    Discharge Date Discharge Disposition Discharge Destination                      Attending Provider: David Garg MD     Allergies:  Compazine [Prochlorperazine Edisylate], Gabapentin, Toradol [Ketorolac Tromethamine], Darvon [Propoxyphene], Vioxx [Rofecoxib], Butalbital-aspirin-caffeine    Isolation:  None   Infection:  None   Code Status:  FULL    Ht:  66\" (167.6 cm)   Wt:  199 lb 4.8 oz (90.4 kg)    Admission Cmt:  None   Principal Problem:  Pneumonia of both upper lobes due to infectious organism [J18.9]                 Active Insurance as of 9/9/2017     Primary Coverage     Payor Plan Insurance Group Employer/Plan Group    HUMANA MEDICARE REPLACEMENT HUMANA MEDICARE REPL [id number]     Payor Plan Address Payor Plan Phone Number Effective From Effective To    [address] [address] [phone] 1/1/2017     [place] [address]       Subscriber Name Subscriber Birth Date Member ID       TESFAYE BUSCH 1975 [id number]                 Emergency Contacts      (Rel.) Home Phone Work Phone Mobile Phone    Olivia Busch (Spouse) [phone] -- --            Insurance Information                HUMANA MEDICARE REPLACEMENT/HUMANA MEDICARE REPL Phone: [phone]    Subscriber: Sav Tesfaye Riley Subscriber#: [id number]    Group#: [id number] Precert#:              History & Physical      David Garg MD at 9/9/2017  8:42 PM              [facility],  INT " MEDICINE   HISTORY AND PHYSICAL      Name:  Tesfaye Ang   Age:  41 y.o.  Sex:  male  :  1975  MRN:  [id number]   Visit Number:  [id number]  Admission Date:  2017  Date Of Service:  17  Primary Care Physician:  Karlee Thomas MD     Admitting diagnosis:    Principal Problem:    Pneumonia of both upper lobes due to infectious organism  Active Problems:    Respiratory failure with hypoxia    Neuropathic pain    Hypertension    Seizures    Myocardial infarction    COPD (chronic obstructive pulmonary disease)        History Of Presenting Illness:      Mr. Meneses came to the ER because of cough shortness of breath and dyspnea for 3 days duration.  He has a history of her hypertension which has been uncontrolled with the MRI possible seizures due to medication as per the patient's COPD and chronic neuropathic pain.  Besides his baseline medical issues he started coughing over the last 3-4 days along with some fever and chills and feeling.  He started coughing mucus which is thick and purulent as per patient the greenish color and due to his worsening shortness of breath came to the ER.  In the ER he was evaluated was found to be hypoxic and the workup done including a CT scan showed there was bilateral pneumonia and was admitted there after giving Rocephin and Zithromax on medical floor.  Patient is hungry denies any chest pain.  His blood pressure has been high since the ER visit and has just got the Norvasc.  Besides that he denies any headache or abdominal pain.  He has chronic neuropathic pain and leg pain and the states that it is ongoing for a long time    Review Of Systems:     The following systems were reviewed and negative;  constitution, eyes, ENT, respiratory, cardiovascular, gastrointestinal, genitourinary, musculoskeletal, neurological and behavioral/psych,  Skin except as above.     Past Medical History:    Past Medical History:   Diagnosis Date   • Arthritis    • CHF  (congestive heart failure)    • Colon polyp    • COPD (chronic obstructive pulmonary disease)    • Fractures    • Heart attack    • Heart disease    • Heart failure    • History of bilateral inguinal hernias    • Hx of transfusion of whole blood    • Hypertension    • Kidney stone    • Myocardial infarction    • Obesity    • Seizures    • Stroke     TIA       Past Surgical history:    Past Surgical History:   Procedure Laterality Date   • APPENDECTOMY  05/2012   • CHOLECYSTECTOMY  12/2012   • COLON RESECTION     • HERNIA REPAIR  04/2016   • REVISION / TAKEDOWN COLOSTOMY         Social History:    Social History     Social History   • Marital status:      Spouse name: N/A   • Number of children: N/A   • Years of education: N/A     Social History Main Topics   • Smoking status: Current Every Day Smoker     Packs/day: 0.50     Types: Cigarettes   • Smokeless tobacco: None   • Alcohol use No      Comment: STOPPED 2001   • Drug use: No   • Sexual activity: Defer     Other Topics Concern   • None     Social History Narrative   • None       Family History:    Family History   Problem Relation Age of Onset   • Heart attack Mother 56   • Hypertension Mother    • Migraines Mother    • Stroke Mother    • Heart attack Father 39   • Hypertension Father    • Arthritis Paternal Grandmother    • Arthritis Paternal Grandfather          Allergies:      Compazine [prochlorperazine edisylate]; Gabapentin; Toradol [ketorolac tromethamine]; Darvon [propoxyphene]; Vioxx [rofecoxib]; and Butalbital-aspirin-caffeine    Home Medications:    Prior to Admission Medications     Prescriptions Last Dose Informant Patient Reported? Taking?    acetaminophen (TYLENOL 8 HOUR) 650 MG 8 hr tablet Unknown  No No    Take 1 tablet by mouth Every 8 (Eight) Hours As Needed for mild pain (1-3).    amLODIPine (NORVASC) 10 MG tablet 9/8/2017  No Yes    Take 1 tablet by mouth Daily.    amLODIPine (NORVASC) 10 MG tablet Unknown  No No    Take 1 tablet by  mouth Daily.    carvedilol (COREG) 25 MG tablet 9/8/2017  No Yes    Take 1 tablet by mouth 2 (Two) Times a Day With Meals.    cyclobenzaprine (FLEXERIL) 5 MG tablet 9/8/2017  No Yes    Take 1 tablet by mouth At Night As Needed for muscle spasms.    hydrochlorothiazide (HYDRODIURIL) 25 MG tablet 9/8/2017  Yes Yes    Take 25 mg by mouth Daily.    lisinopril (PRINIVIL,ZESTRIL) 20 MG tablet 9/8/2017  No Yes    Take 1 tablet by mouth Daily.    mupirocin (BACTROBAN) 2 % ointment   No No    Apply  topically 3 (Three) Times a Day.    sulfamethoxazole-trimethoprim (BACTRIM DS,SEPTRA DS) 800-160 MG per tablet   No No    Take 1 tablet by mouth 2 (Two) Times a Day.                 Vital Signs:    Temp:  [97.7 °F (36.5 °C)-99.2 °F (37.3 °C)] 98.7 °F (37.1 °C)  Heart Rate:  [106-129] 110  Resp:  [20-24] 20  BP: (148-189)/() 166/110    Last 3 weights    09/09/17  1353   Weight: 198 lb (89.8 kg)       Body mass index is 31.96 kg/(m^2).    Physical Exam:      General Appearance:    Alert and cooperative,  And lying comfortably in bed   Head:    Atraumatic and normocephalic, without obvious abnormality.   Eyes:            PERRLA, conjunctivae and sclerae normal, no Icterus. No pallor. Extra-occular movements are within normal limits.   Ears:    Ears appear intact with no abnormalities noted.   Throat:   No oral lesions, no thrush, oral mucosa moist.   Neck:   Supple, trachea midline, no thyromegaly, no carotid bruit, no lymphadenopathy   Lungs:     Chest shape is normal. Breath sounds heard bilaterally equally.  Bilateral crepitations audible to oral and the 11th one third of the lungs and no wheezing or      Heart:    Normal S1 and S2, no murmur, no gallop, no rub. No JVD   Abdomen:     Normal bowel sounds, no masses, no organomegaly. Soft        non-tender, non-distended, no guarding, no rebound                tenderness   Extremities:   Moves all extremities well, no edema, no cyanosis, no             clubbing   Skin:   No   bruising or rash   Neurologic:   Cranial nerves 2 - 12 grossly intact, sensation intact, Motor power is normal and equal bilaterally.       EKG:      EKG shows sinus tachycardia with a rate of 128    Labs:    Lab Results (last 24 hours)     Procedure Component Value Units Date/Time    CBC & Differential [[id number]] Collected:  09/09/17 1356    Specimen:  Blood Updated:  09/09/17 1404    Narrative:       The following orders were created for panel order CBC & Differential.  Procedure                               Abnormality         Status                     ---------                               -----------         ------                     CBC Auto Differential[[id number]]        Abnormal            Final result                 Please view results for these tests on the individual orders.    CBC Auto Differential [[id number]]  (Abnormal) Collected:  09/09/17 1356    Specimen:  Blood Updated:  09/09/17 1404     WBC 16.72 (H) 10*3/mm3      RBC 6.26 (H) 10*6/mm3      Hemoglobin 17.4 g/dL      Hematocrit 51.8 %      MCV 82.7 fL      MCH 27.8 pg      MCHC 33.6 g/dL      RDW 15.0 (H) %      RDW-SD 44.3 fl      MPV 9.5 fL      Platelets 188 10*3/mm3      Neutrophil % 84.5 (H) %      Lymphocyte % 9.0 (L) %      Monocyte % 5.0 %      Eosinophil % 0.3 %      Basophil % 0.4 %      Immature Grans % 0.8 (H) %      Neutrophils, Absolute 14.14 (H) 10*3/mm3      Lymphocytes, Absolute 1.51 10*3/mm3      Monocytes, Absolute 0.83 10*3/mm3      Eosinophils, Absolute 0.05 10*3/mm3      Basophils, Absolute 0.06 10*3/mm3      Immature Grans, Absolute 0.13 (H) 10*3/mm3      nRBC 0.0 /100 WBC     Comprehensive Metabolic Panel [[id number]]  (Abnormal) Collected:  09/09/17 1356    Specimen:  Blood Updated:  09/09/17 1429     Glucose 122 (H) mg/dL      BUN 15 mg/dL      Creatinine 0.90 mg/dL      Sodium 140 mmol/L      Potassium 3.6 mmol/L      Chloride 107 mmol/L      CO2 19.0 (L) mmol/L      Calcium 9.3 mg/dL      Total Protein 7.7 g/dL       Albumin 4.40 g/dL      ALT (SGPT) 35 U/L      AST (SGOT) 30 U/L      Alkaline Phosphatase 68 U/L      Total Bilirubin 0.8 mg/dL      eGFR Non African Amer 93 mL/min/1.73      Globulin 3.3 gm/dL      A/G Ratio 1.3 g/dL      BUN/Creatinine Ratio 16.7     Anion Gap 17.6 mmol/L     Narrative:       Abnormal estimated GFR should be followed by more specific studies to confirm end stage chronic renal disease. The equation used for calculation may not be accurate for patients less than 19 years old, greater than 70 years old, patients at extremes of weight, malnutrition, or with acute renal dysfunction.    Troponin [[id number]]  (Normal) Collected:  09/09/17 1356    Specimen:  Blood Updated:  09/09/17 1429     Troponin I 0.021 ng/mL     Narrative:       Normal Patient Upper Reference Limit (URL) (99th Percentile)=0.03 ng/mL   Non-AMI Illness Reference Limit=0.03-0.11 ng/mL   AMI Confirmation=0.12 ng/mL and above    BNP [[id number]]  (Abnormal) Collected:  09/09/17 1356    Specimen:  Blood Updated:  09/09/17 1431     proBNP 557.0 (C) pg/mL     Lactic Acid, Plasma [[id number]]  (Normal) Collected:  09/09/17 1432    Specimen:  Blood Updated:  09/09/17 1456     Lactate 1.1 mmol/L     [place] Draw [[id number]] Collected:  09/09/17 1356    Specimen:  Blood Updated:  09/09/17 1501    Narrative:       The following orders were created for panel order [place] Draw.  Procedure                               Abnormality         Status                     ---------                               -----------         ------                     Light Blue Top[[id number]]                                   Final result               Lavender Top[[id number]]                                     Final result               Gold Top - SST[[id number]]                                   Final result               Green Top (No Gel)[[id number]]                               Final result                 Please view results for these tests on the individual  orders.    Light Blue Top [[id number]] Collected:  09/09/17 1356    Specimen:  Blood Updated:  09/09/17 1501     Extra Tube hold for add-on      Auto resulted       Lavender Top [[id number]] Collected:  09/09/17 1356    Specimen:  Blood Updated:  09/09/17 1501     Extra Tube hold for add-on      Auto resulted       Gold Top - SST [[id number]] Collected:  09/09/17 1356    Specimen:  Blood Updated:  09/09/17 1501     Extra Tube Hold for add-ons.      Auto resulted.       Green Top (No Gel) [[id number]] Collected:  09/09/17 1356    Specimen:  Blood Updated:  09/09/17 1501     Extra Tube Hold for add-ons.      Auto resulted.       Blood Culture [[id number]] Collected:  09/09/17 1432    Specimen:  Blood from Hand, Left Updated:  09/09/17 1503    Blood Culture [[id number]] Collected:  09/09/17 1440    Specimen:  Blood from Hand, Right Updated:  09/09/17 1504    Urinalysis With / Culture If Indicated [[id number]]  (Abnormal) Collected:  09/09/17 1525    Specimen:  Urine from Urine, Clean Catch Updated:  09/09/17 1533     Color, UA Yellow     Appearance, UA Clear     pH, UA 5.5     Specific Gravity, UA 1.025     Glucose, UA Negative     Ketones, UA >=160 mg/dL (4+) (A)     Bilirubin, UA Small (1+) (A)     Blood, UA Trace (A)     Protein,  mg/dL (2+) (A)     Leuk Esterase, UA Negative     Nitrite, UA Negative     Urobilinogen, UA 1.0 E.U./dL    Urinalysis, Microscopic Only [[id number]]  (Abnormal) Collected:  09/09/17 1525    Specimen:  Urine from Urine, Clean Catch Updated:  09/09/17 1539     RBC, UA 0-2 (A) /HPF      WBC, UA 0-2 (A) /HPF      Bacteria, UA Trace (A) /HPF      Squamous Epithelial Cells, UA 0-2 /HPF      Hyaline Casts, UA None Seen /LPF      Granular Casts, UA 0-2 /LPF      Mucus, UA Large/3+ (A) /HPF      Oval Fat Bodies, UA Small/1+ /HPF      Methodology Manual Light Microscopy    Urine Drug Screen [[id number]]  (Abnormal) Collected:  09/09/17 1525    Specimen:  Urine from Urine, Clean Catch Updated:  09/09/17  1542     THC, Screen, Urine Negative     Phencyclidine (PCP), Urine Negative     Cocaine Screen, Urine Negative     Methamphetamine, Urine Negative     Opiate Screen Positive (A)     Amphetamine Screen, Urine Negative     Benzodiazepine Screen, Urine Negative     Tricyclic Antidepressants Screen Negative     Methadone Screen, Urine Negative     Barbiturates Screen, Urine Negative     Oxycodone Screen, Urine Negative     Propoxyphene Screen Negative     Buprenorphine, Screen, Urine Negative    Narrative:       Limitations of this procedure include the possibility of false positives due to interfering substances in the urine sample. Clinical data should be correlated with any questionable result. Positive results should be considered Presumptive Positive until results are confirmed with another methodology such as HPLC or GCMS.    Lipase [[id number]]  (Normal) Collected:  09/09/17 1356    Specimen:  Blood Updated:  09/09/17 1558     Lipase 31 U/L           Radiology:    Imaging Results (last 72 hours)     Procedure Component Value Units Date/Time    CT Chest Pulmonary Embolism With Contrast [[id number]] Collected:  09/09/17 1634     Updated:  09/09/17 1636    Narrative:       FINAL REPORT    TECHNIQUE:  Thin section axial CT images were performed from the lung apices to the upper abdomen after the administration of IV contrast.  3-D and MIP reconstructions performed. This study was performed with techniques to keep radiation doses as low as reasonably   achievable (ALARA). Individualized dose reduction techniques using automated exposure control or adjustment of mA and/or kV according to the patient''s size were employed.    CLINICAL HISTORY:  SOA    FINDINGS:  Contrast bolus is somewhat limited. There is no evidence for pulmonary embolism. The thoracic aorta is patent without evidence of dissection. There is no axillary adenopathy. There is no mediastinal or hilar adenopathy. The heart size is normal. There is   no  pleural or pericardial effusion. There is bilateral perihilar groundglass airspace disease most consistent with pneumonia. Mycoplasma should be considered. Within the posterior, superior segment of the right lower lobe is an 8.5 mm pleural-based   nodule as well as a 5.5 mm noncalcified nodule seen laterally both on image #45 which are indeterminate. However, this is likely postinflammatory given patient's age.    Limited images of the upper abdomen are unremarkable.      Impression:       No evidence of pulmonary embolism or aortic dissection.    Bilateral perihilar groundglass airspace disease most consistent with pneumonia.    2 right lung pleural-based nodules, indeterminate. Favor post inflammatory given patient's age.    Authenticated by Dallas Alvarez MD on 09/09/2017 04:34:53 PM    XR Chest 2 View [[id number]] Resulted:  09/09/17 1731     Updated:  09/09/17 1731          Assessment:    Assessment/Plan     Principal Problem:    Pneumonia of both upper lobes due to infectious organism  Active Problems:    Respiratory failure with hypoxia    Neuropathic pain    Hypertension    Seizures    Myocardial infarction    COPD (chronic obstructive pulmonary disease)        Plan:     1.  Patient with the pneumonia of both lungs along with the acute hypoxic respiratory failure due to pneumonia.  He will be treated as a community-acquired pneumonia with Rocephin and Zithromax and also IV fluids and bronchodilators will be used   Along with mucolytic agent.  2.  Hypertension which is uncontrolled rate we'll start with the Norvasc Coreg and increase her lisinopril dose and has not had his dose today so we'll give his a regular meds and if still uncontrolled may add nitroglycerin.  Continue rest of his home meds and will get the complete list and updated today as he doesn't have his current medicine.  David Garg MD  09/09/17  8:42 PM    Please note that portions of this note may have been completed with a voice  recognition program. Efforts were made to edit the dictations, but occasionally words are mistranscribed.     Electronically signed by David Garg MD at 9/9/2017  8:48 PM           Emergency Department Notes      Chris Hamm PA-C at 9/9/2017  2:17 PM     Attestation signed by Jose Antonio Kendall MD at 9/9/2017  6:28 PM              For this patient encounter, I reviewed the NP or PA documentation, treatment plan, and medical decision making. Jose Antonio Kendall MD 9/9/2017 6:28 PM                               Subjective   HPI Comments: Patient is here with complaint of increased cough congestion for the past 3-4 days fever/chills some nausea coughing up some thick phlegm denies abdominal pain no vomiting reported patient does not wear oxygen at home, he does have history of hypertension presents here via EMS with worsening symptoms today, here for further evaluation      Review of Systems   Constitutional: Positive for appetite change, chills and fever.   HENT: Positive for congestion and rhinorrhea. Negative for trouble swallowing.    Eyes: Negative.    Respiratory: Positive for cough and shortness of breath.    Cardiovascular: Negative.    Gastrointestinal: Positive for nausea. Negative for abdominal pain.   Genitourinary: Negative.    Musculoskeletal: Positive for arthralgias. Negative for neck pain and neck stiffness.   Skin: Negative.  Negative for rash.   Neurological: Negative.    Psychiatric/Behavioral: The patient is nervous/anxious.    All other systems reviewed and are negative.      Past Medical History:   Diagnosis Date   • Arthritis    • CHF (congestive heart failure)    • Colon polyp    • Fractures    • Heart attack    • Heart disease    • Heart failure    • History of bilateral inguinal hernias    • Hx of transfusion of whole blood    • Hypertension    • Kidney stone    • Myocardial infarction    • Obesity    • Seizures    • Stroke     TIA       Allergies   Allergen Reactions   • Compazine  [Prochlorperazine Edisylate] Anaphylaxis and Shortness Of Breath   • Gabapentin Other (See Comments)     seizures   • Toradol [Ketorolac Tromethamine] Hives and Shortness Of Breath   • Darvon [Propoxyphene] Other (See Comments) and Hives   • Vioxx [Rofecoxib] Other (See Comments)     Stomach bleeding  Stomach bleeding   • Butalbital-Aspirin-Caffeine Hives       Past Surgical History:   Procedure Laterality Date   • APPENDECTOMY  05/2012   • CHOLECYSTECTOMY  12/2012   • COLON RESECTION     • HERNIA REPAIR  04/2016   • REVISION / TAKEDOWN COLOSTOMY         Family History   Problem Relation Age of Onset   • Heart attack Mother 56   • Hypertension Mother    • Migraines Mother    • Stroke Mother    • Heart attack Father 39   • Hypertension Father    • Arthritis Paternal Grandmother    • Arthritis Paternal Grandfather        Social History     Social History   • Marital status:      Spouse name: N/A   • Number of children: N/A   • Years of education: N/A     Social History Main Topics   • Smoking status: Current Every Day Smoker     Packs/day: 0.50     Types: Cigarettes   • Smokeless tobacco: None   • Alcohol use No      Comment: STOPPED 2001   • Drug use: No   • Sexual activity: Defer     Other Topics Concern   • None     Social History Narrative           Objective   Physical Exam   Constitutional: He is oriented to person, place, and time. He appears well-developed and well-nourished.   Patient is nontoxic but does seem somewhat ill in appearance afebrile no acute distress   HENT:   Head: Normocephalic.   Mouth/Throat: Oropharynx is clear and moist.   Eyes: Conjunctivae and EOM are normal. Pupils are equal, round, and reactive to light.   Neck: Normal range of motion. Neck supple.   Cardiovascular: Normal heart sounds and intact distal pulses.    Tachycardic 129   Pulmonary/Chest: Effort normal. No respiratory distress. He has rales.   Abdominal: Soft. Bowel sounds are normal. There is no tenderness.    Musculoskeletal: Normal range of motion.   Neurological: He is alert and oriented to person, place, and time. No cranial nerve deficit.   Skin: Skin is warm and dry. No rash noted.   Psychiatric: He has a normal mood and affect. His behavior is normal. Judgment and thought content normal.   Nursing note and vitals reviewed.      Procedures        ED Course  ED Course   Comment By Time   Case labs management reviewed with Dr. Faiza Hamm PA-C 09/09 1431   Patient has been resting comfortably pending  CT report Chris Hamm PA-C 09/09 1605   CT scan demonstrates bilateral perihilar groundglass airspace disease most consistent with pneumonia no evidence of PE or aortic dissection Chris Hamm PA-C 09/09 1642   Paged Dr Forest Hamm PA-C 09/09 1710                  MDM  Number of Diagnoses or Management Options  Pneumonia of both upper lobes due to infectious organism:      Amount and/or Complexity of Data Reviewed  Clinical lab tests: reviewed  Tests in the radiology section of CPT®:  reviewed  Tests in the medicine section of CPT®:  ordered  Discuss the patient with other providers: yes  Independent visualization of images, tracings, or specimens: yes    Risk of Complications, Morbidity, and/or Mortality  Presenting problems: moderate  Diagnostic procedures: moderate  Management options: moderate    Patient Progress  Patient progress: improved      Final diagnoses:   Pneumonia of both upper lobes due to infectious organism            Chris Hamm PA-C  09/09/17 1717       Chris Hamm PA-C  09/09/17 1813       Electronically signed by Jose Antonio Kendall MD at 9/9/2017  6:28 PM      Maddy Hernandez at 9/9/2017  5:10 PM          Paged Dr. Garg for JAGRUTI Perez, @ [phone].     Maddy Hernandez  09/09/17 1710       Electronically signed by Maddy Hernandez at 9/9/2017  5:10 PM      Maddy Hernandez at 9/9/2017  5:11 PM          Dr. Garg contacted JAGRUTI Perez, back @ [phone].    "  Maddyterri Hernandez  09/09/17 1711       Electronically signed by Maddy Hernandez at 9/9/2017  5:11 PM        Vital Signs (last 24 hours)       09/09 0700  -  09/10 0659 09/10 0700  -  09/10 0815   Most Recent    Temp (°F) 97.5 -  99.2       97.5 (36.4)    Heart Rate 89 -  (!)129       94    Resp 20 -  24       20    /91 -  (!) 189/110       164/95    SpO2 (%) (!)89 -  96       92          Hospital Medications (all)       Dose Frequency Start End    acetaminophen (TYLENOL) 8 hr tablet 650 mg 650 mg Every 8 Hours PRN 9/9/2017     Sig - Route: Take 1 tablet by mouth Every 8 (Eight) Hours As Needed for Mild Pain . - Oral    acetaminophen (TYLENOL) tablet 650 mg 650 mg Once 9/9/2017 9/9/2017    Sig - Route: Take 2 tablets by mouth 1 (One) Time. - Oral    Cosign for Ordering: Accepted by Jose Antonio Kendall MD on 9/9/2017  6:26 PM    acetaminophen (TYLENOL) tablet 650 mg 650 mg Every 4 Hours PRN 9/9/2017     Sig - Route: Take 2 tablets by mouth Every 4 (Four) Hours As Needed for Mild Pain . - Oral    amLODIPine (NORVASC) tablet 10 mg 10 mg Daily 9/10/2017     Sig - Route: Take 2 tablets by mouth Daily. - Oral    AZITHROMYCIN 500 MG/250 ML 0.9% NS IVPB (vial-mate) 500 mg Once 9/9/2017 9/9/2017    Sig - Route: Infuse 500 mg into a venous catheter 1 (One) Time. - Intravenous    Cosign for Ordering: Accepted by Jose Antonio Kendall MD on 9/9/2017  6:26 PM    AZITHROMYCIN 500 MG/250 ML 0.9% NS IVPB (vial-mate) 500 mg Every 24 Hours 9/10/2017     Sig - Route: Infuse 500 mg into a venous catheter Daily. - Intravenous    Linked Group 1:  \"And\" Linked Group Details        carvedilol (COREG) tablet 25 mg 25 mg 2 Times Daily With Meals 9/9/2017     Sig - Route: Take 1 tablet by mouth 2 (Two) Times a Day With Meals. - Oral    cefTRIAXone (ROCEPHIN) 1 g/100 mL 0.9% NS VTB (mbp) 1 g Once 9/9/2017 9/9/2017    Sig - Route: Infuse 100 mL into a venous catheter 1 (One) Time. - Intravenous    Cosign for Ordering: Accepted by Jose Antonio Kendall MD on " "9/9/2017  6:26 PM    cefTRIAXone (ROCEPHIN) 1 g/100 mL 0.9% NS VTB (mbp) 1 g Every 24 Hours 9/10/2017     Sig - Route: Infuse 100 mL into a venous catheter Daily. - Intravenous    Linked Group 1:  \"And\" Linked Group Details        CloNIDine (CATAPRES) tablet 0.2 mg 0.2 mg Every 12 Hours Scheduled 9/10/2017     Sig - Route: Take 1 tablet by mouth Every 12 (Twelve) Hours. - Oral    cyclobenzaprine (FLEXERIL) tablet 5 mg 5 mg Nightly PRN 9/9/2017     Sig - Route: Take 0.5 tablets by mouth At Night As Needed for Muscle Spasms. - Oral    enoxaparin (LOVENOX) syringe 40 mg 40 mg Daily 9/10/2017     Sig - Route: Inject 0.4 mL under the skin Daily. - Subcutaneous    hydrochlorothiazide (HYDRODIURIL) tablet 25 mg 25 mg Daily 9/10/2017     Sig - Route: Take 1 tablet by mouth Daily. - Oral    iopamidol (ISOVUE-300) 61 % injection 100 mL 100 mL Once in Imaging 9/9/2017 9/9/2017    Sig - Route: Infuse 100 mL into a venous catheter Once. - Intravenous    ipratropium-albuterol (DUO-NEB) nebulizer solution 3 mL 3 mL Once 9/9/2017 9/9/2017    Sig - Route: Take 3 mL by nebulization 1 (One) Time. - Nebulization    Cosign for Ordering: Accepted by Jose Antonio Kendall MD on 9/9/2017  6:26 PM    lisinopril (PRINIVIL,ZESTRIL) tablet 40 mg 40 mg Daily 9/10/2017     Sig - Route: Take 2 tablets by mouth Daily. - Oral    lisinopril (PRINIVIL,ZESTRIL) tablet 40 mg 40 mg Every 24 Hours Scheduled 9/10/2017 9/10/2017    Sig - Route: Take 2 tablets by mouth Daily. - Oral    magnesium hydroxide (MILK OF MAGNESIA) suspension 2400 mg/10mL 10 mL 10 mL Daily PRN 9/9/2017     Sig - Route: Take 10 mL by mouth Daily As Needed for Constipation. - Oral    methylPREDNISolone sodium succinate (SOLU-Medrol) injection 125 mg 125 mg Once 9/9/2017 9/9/2017    Sig - Route: Infuse 2 mL into a venous catheter 1 (One) Time. - Intravenous    Cosign for Ordering: Accepted by Jose Antonio Kendall MD on 9/9/2017  6:26 PM    metoprolol tartrate (LOPRESSOR) injection 5 mg 5 mg Once " 9/9/2017 9/9/2017    Sig - Route: Infuse 5 mL into a venous catheter 1 (One) Time. - Intravenous    Cosign for Ordering: Accepted by Jose Antonio Kendall MD on 9/9/2017  6:26 PM    predniSONE (DELTASONE) tablet 40 mg 40 mg Once 9/9/2017 9/9/2017    Sig - Route: Take 2 tablets by mouth 1 (One) Time. - Oral    Cosign for Ordering: Accepted by Jose Antonio Kendall MD on 9/9/2017  6:26 PM    sodium chloride 0.9 % bolus 1,000 mL 1,000 mL Once 9/9/2017 9/9/2017    Sig - Route: Infuse 1,000 mL into a venous catheter 1 (One) Time. - Intravenous    Cosign for Ordering: Accepted by Jose Antonio Kendall MD on 9/9/2017  6:26 PM    sodium chloride 0.9 % bolus 1,000 mL 1,000 mL Once 9/9/2017 9/9/2017    Sig - Route: Infuse 1,000 mL into a venous catheter 1 (One) Time. - Intravenous    Cosign for Ordering: Accepted by Jose Antonio Kendall MD on 9/9/2017  6:26 PM    sodium chloride 0.9 % flush 10 mL 10 mL As Needed 9/9/2017     Sig - Route: Infuse 10 mL into a venous catheter As Needed for Line Care. - Intravenous    Cosign for Ordering: Accepted by Jose Antonio Kendall MD on 9/9/2017  6:26 PM    sodium chloride 0.9 % infusion 100 mL/hr Continuous 9/9/2017     Sig - Route: Infuse 100 mL/hr into a venous catheter Continuous. - Intravenous    amLODIPine (NORVASC) tablet 10 mg (Discontinued) 10 mg Every 24 Hours Scheduled 9/9/2017 9/10/2017    Sig - Route: Take 2 tablets by mouth Daily. - Oral    Reason for Discontinue: Alternate therapy    metoprolol tartrate (LOPRESSOR) injection 5 mg (Discontinued) 5 mg Once 9/9/2017 9/9/2017    Sig - Route: Infuse 5 mL into a venous catheter 1 (One) Time. - Intravenous    Cosign for Ordering: Accepted by Jose Antonio Kendall MD on 9/9/2017  6:26 PM          Physician Progress Notes (last 24 hours) (Notes from 9/9/2017  8:15 AM through 9/10/2017  8:15 AM)     No notes of this type exist for this encounter.        Consult Notes (last 24 hours) (Notes from 9/9/2017  8:15 AM through 9/10/2017  8:15 AM)     No notes of this type  exist for this encounter.        Respiratory Therapy Notes (last 24 hours) (Notes from 9/9/2017  8:15 AM through 9/10/2017  8:15 AM)     No notes of this type exist for this encounter.

## 2017-09-10 NOTE — H&P
[facility]   HISTORY AND PHYSICAL      Name:  Tesfaye Ang   Age:  41 y.o.  Sex:  male  :  1975  MRN:  [id number]   Visit Number:  [id number]  Admission Date:  2017  Date Of Service:  17  Primary Care Physician:  Karlee Thomas MD     Admitting diagnosis:    Principal Problem:    Pneumonia of both upper lobes due to infectious organism  Active Problems:    Respiratory failure with hypoxia    Neuropathic pain    Hypertension    Seizures    Myocardial infarction    COPD (chronic obstructive pulmonary disease)        History Of Presenting Illness:      Mr. Meneses came to the ER because of cough shortness of breath and dyspnea for 3 days duration.  He has a history of her hypertension which has been uncontrolled with the MRI possible seizures due to medication as per the patient's COPD and chronic neuropathic pain.  Besides his baseline medical issues he started coughing over the last 3-4 days along with some fever and chills and feeling.  He started coughing mucus which is thick and purulent as per patient the greenish color and due to his worsening shortness of breath came to the ER.  In the ER he was evaluated was found to be hypoxic and the workup done including a CT scan showed there was bilateral pneumonia and was admitted there after giving Rocephin and Zithromax on medical floor.  Patient is hungry denies any chest pain.  His blood pressure has been high since the ER visit and has just got the Norvasc.  Besides that he denies any headache or abdominal pain.  He has chronic neuropathic pain and leg pain and the states that it is ongoing for a long time    Review Of Systems:     The following systems were reviewed and negative;  constitution, eyes, ENT, respiratory, cardiovascular, gastrointestinal, genitourinary, musculoskeletal, neurological and behavioral/psych,  Skin except as above.     Past Medical History:    Past Medical History:   Diagnosis  Date   • Arthritis    • CHF (congestive heart failure)    • Colon polyp    • COPD (chronic obstructive pulmonary disease)    • Fractures    • Heart attack    • Heart disease    • Heart failure    • History of bilateral inguinal hernias    • Hx of transfusion of whole blood    • Hypertension    • Kidney stone    • Myocardial infarction    • Obesity    • Seizures    • Stroke     TIA       Past Surgical history:    Past Surgical History:   Procedure Laterality Date   • APPENDECTOMY  05/2012   • CHOLECYSTECTOMY  12/2012   • COLON RESECTION     • HERNIA REPAIR  04/2016   • REVISION / TAKEDOWN COLOSTOMY         Social History:    Social History     Social History   • Marital status:      Spouse name: N/A   • Number of children: N/A   • Years of education: N/A     Social History Main Topics   • Smoking status: Current Every Day Smoker     Packs/day: 0.50     Types: Cigarettes   • Smokeless tobacco: None   • Alcohol use No      Comment: STOPPED 2001   • Drug use: No   • Sexual activity: Defer     Other Topics Concern   • None     Social History Narrative   • None       Family History:    Family History   Problem Relation Age of Onset   • Heart attack Mother 56   • Hypertension Mother    • Migraines Mother    • Stroke Mother    • Heart attack Father 39   • Hypertension Father    • Arthritis Paternal Grandmother    • Arthritis Paternal Grandfather          Allergies:      Compazine [prochlorperazine edisylate]; Gabapentin; Toradol [ketorolac tromethamine]; Darvon [propoxyphene]; Vioxx [rofecoxib]; and Butalbital-aspirin-caffeine    Home Medications:    Prior to Admission Medications     Prescriptions Last Dose Informant Patient Reported? Taking?    acetaminophen (TYLENOL 8 HOUR) 650 MG 8 hr tablet Unknown  No No    Take 1 tablet by mouth Every 8 (Eight) Hours As Needed for mild pain (1-3).    amLODIPine (NORVASC) 10 MG tablet 9/8/2017  No Yes    Take 1 tablet by mouth Daily.    amLODIPine (NORVASC) 10 MG tablet Unknown   No No    Take 1 tablet by mouth Daily.    carvedilol (COREG) 25 MG tablet 9/8/2017  No Yes    Take 1 tablet by mouth 2 (Two) Times a Day With Meals.    cyclobenzaprine (FLEXERIL) 5 MG tablet 9/8/2017  No Yes    Take 1 tablet by mouth At Night As Needed for muscle spasms.    hydrochlorothiazide (HYDRODIURIL) 25 MG tablet 9/8/2017  Yes Yes    Take 25 mg by mouth Daily.    lisinopril (PRINIVIL,ZESTRIL) 20 MG tablet 9/8/2017  No Yes    Take 1 tablet by mouth Daily.    mupirocin (BACTROBAN) 2 % ointment   No No    Apply  topically 3 (Three) Times a Day.    sulfamethoxazole-trimethoprim (BACTRIM DS,SEPTRA DS) 800-160 MG per tablet   No No    Take 1 tablet by mouth 2 (Two) Times a Day.                 Vital Signs:    Temp:  [97.7 °F (36.5 °C)-99.2 °F (37.3 °C)] 98.7 °F (37.1 °C)  Heart Rate:  [106-129] 110  Resp:  [20-24] 20  BP: (148-189)/() 166/110    Last 3 weights    09/09/17  1353   Weight: 198 lb (89.8 kg)       Body mass index is 31.96 kg/(m^2).    Physical Exam:      General Appearance:    Alert and cooperative,  And lying comfortably in bed   Head:    Atraumatic and normocephalic, without obvious abnormality.   Eyes:            PERRLA, conjunctivae and sclerae normal, no Icterus. No pallor. Extra-occular movements are within normal limits.   Ears:    Ears appear intact with no abnormalities noted.   Throat:   No oral lesions, no thrush, oral mucosa moist.   Neck:   Supple, trachea midline, no thyromegaly, no carotid bruit, no lymphadenopathy   Lungs:     Chest shape is normal. Breath sounds heard bilaterally equally.  Bilateral crepitations audible to oral and the 11th one third of the lungs and no wheezing or      Heart:    Normal S1 and S2, no murmur, no gallop, no rub. No JVD   Abdomen:     Normal bowel sounds, no masses, no organomegaly. Soft        non-tender, non-distended, no guarding, no rebound                tenderness   Extremities:   Moves all extremities well, no edema, no cyanosis, no              clubbing   Skin:   No  bruising or rash   Neurologic:   Cranial nerves 2 - 12 grossly intact, sensation intact, Motor power is normal and equal bilaterally.       EKG:      EKG shows sinus tachycardia with a rate of 128    Labs:    Lab Results (last 24 hours)     Procedure Component Value Units Date/Time    CBC & Differential [[id number]] Collected:  09/09/17 1356    Specimen:  Blood Updated:  09/09/17 1404    Narrative:       The following orders were created for panel order CBC & Differential.  Procedure                               Abnormality         Status                     ---------                               -----------         ------                     CBC Auto Differential[[id number]]        Abnormal            Final result                 Please view results for these tests on the individual orders.    CBC Auto Differential [[id number]]  (Abnormal) Collected:  09/09/17 1356    Specimen:  Blood Updated:  09/09/17 1404     WBC 16.72 (H) 10*3/mm3      RBC 6.26 (H) 10*6/mm3      Hemoglobin 17.4 g/dL      Hematocrit 51.8 %      MCV 82.7 fL      MCH 27.8 pg      MCHC 33.6 g/dL      RDW 15.0 (H) %      RDW-SD 44.3 fl      MPV 9.5 fL      Platelets 188 10*3/mm3      Neutrophil % 84.5 (H) %      Lymphocyte % 9.0 (L) %      Monocyte % 5.0 %      Eosinophil % 0.3 %      Basophil % 0.4 %      Immature Grans % 0.8 (H) %      Neutrophils, Absolute 14.14 (H) 10*3/mm3      Lymphocytes, Absolute 1.51 10*3/mm3      Monocytes, Absolute 0.83 10*3/mm3      Eosinophils, Absolute 0.05 10*3/mm3      Basophils, Absolute 0.06 10*3/mm3      Immature Grans, Absolute 0.13 (H) 10*3/mm3      nRBC 0.0 /100 WBC     Comprehensive Metabolic Panel [[id number]]  (Abnormal) Collected:  09/09/17 1356    Specimen:  Blood Updated:  09/09/17 1429     Glucose 122 (H) mg/dL      BUN 15 mg/dL      Creatinine 0.90 mg/dL      Sodium 140 mmol/L      Potassium 3.6 mmol/L      Chloride 107 mmol/L      CO2 19.0 (L) mmol/L      Calcium 9.3 mg/dL       Total Protein 7.7 g/dL      Albumin 4.40 g/dL      ALT (SGPT) 35 U/L      AST (SGOT) 30 U/L      Alkaline Phosphatase 68 U/L      Total Bilirubin 0.8 mg/dL      eGFR Non African Amer 93 mL/min/1.73      Globulin 3.3 gm/dL      A/G Ratio 1.3 g/dL      BUN/Creatinine Ratio 16.7     Anion Gap 17.6 mmol/L     Narrative:       Abnormal estimated GFR should be followed by more specific studies to confirm end stage chronic renal disease. The equation used for calculation may not be accurate for patients less than 19 years old, greater than 70 years old, patients at extremes of weight, malnutrition, or with acute renal dysfunction.    Troponin [[id number]]  (Normal) Collected:  09/09/17 1356    Specimen:  Blood Updated:  09/09/17 1429     Troponin I 0.021 ng/mL     Narrative:       Normal Patient Upper Reference Limit (URL) (99th Percentile)=0.03 ng/mL   Non-AMI Illness Reference Limit=0.03-0.11 ng/mL   AMI Confirmation=0.12 ng/mL and above    BNP [[id number]]  (Abnormal) Collected:  09/09/17 1356    Specimen:  Blood Updated:  09/09/17 1431     proBNP 557.0 (C) pg/mL     Lactic Acid, Plasma [[id number]]  (Normal) Collected:  09/09/17 1432    Specimen:  Blood Updated:  09/09/17 1456     Lactate 1.1 mmol/L     Floweree Draw [[id number]] Collected:  09/09/17 1356    Specimen:  Blood Updated:  09/09/17 1501    Narrative:       The following orders were created for panel order Floweree Draw.  Procedure                               Abnormality         Status                     ---------                               -----------         ------                     Light Blue Top[[id number]]                                   Final result               Lavender Top[[id number]]                                     Final result               Gold Top - SST[[id number]]                                   Final result               Green Top (No Gel)[[id number]]                               Final result                 Please view results for  these tests on the individual orders.    Light Blue Top [[id number]] Collected:  09/09/17 1356    Specimen:  Blood Updated:  09/09/17 1501     Extra Tube hold for add-on      Auto resulted       Lavender Top [[id number]] Collected:  09/09/17 1356    Specimen:  Blood Updated:  09/09/17 1501     Extra Tube hold for add-on      Auto resulted       Gold Top - SST [[id number]] Collected:  09/09/17 1356    Specimen:  Blood Updated:  09/09/17 1501     Extra Tube Hold for add-ons.      Auto resulted.       Green Top (No Gel) [[id number]] Collected:  09/09/17 1356    Specimen:  Blood Updated:  09/09/17 1501     Extra Tube Hold for add-ons.      Auto resulted.       Blood Culture [[id number]] Collected:  09/09/17 1432    Specimen:  Blood from Hand, Left Updated:  09/09/17 1503    Blood Culture [[id number]] Collected:  09/09/17 1440    Specimen:  Blood from Hand, Right Updated:  09/09/17 1504    Urinalysis With / Culture If Indicated [[id number]]  (Abnormal) Collected:  09/09/17 1525    Specimen:  Urine from Urine, Clean Catch Updated:  09/09/17 1533     Color, UA Yellow     Appearance, UA Clear     pH, UA 5.5     Specific Gravity, UA 1.025     Glucose, UA Negative     Ketones, UA >=160 mg/dL (4+) (A)     Bilirubin, UA Small (1+) (A)     Blood, UA Trace (A)     Protein,  mg/dL (2+) (A)     Leuk Esterase, UA Negative     Nitrite, UA Negative     Urobilinogen, UA 1.0 E.U./dL    Urinalysis, Microscopic Only [[id number]]  (Abnormal) Collected:  09/09/17 1525    Specimen:  Urine from Urine, Clean Catch Updated:  09/09/17 1539     RBC, UA 0-2 (A) /HPF      WBC, UA 0-2 (A) /HPF      Bacteria, UA Trace (A) /HPF      Squamous Epithelial Cells, UA 0-2 /HPF      Hyaline Casts, UA None Seen /LPF      Granular Casts, UA 0-2 /LPF      Mucus, UA Large/3+ (A) /HPF      Oval Fat Bodies, UA Small/1+ /HPF      Methodology Manual Light Microscopy    Urine Drug Screen [[id number]]  (Abnormal) Collected:  09/09/17 1525    Specimen:  Urine from Urine,  Clean Catch Updated:  09/09/17 1542     THC, Screen, Urine Negative     Phencyclidine (PCP), Urine Negative     Cocaine Screen, Urine Negative     Methamphetamine, Urine Negative     Opiate Screen Positive (A)     Amphetamine Screen, Urine Negative     Benzodiazepine Screen, Urine Negative     Tricyclic Antidepressants Screen Negative     Methadone Screen, Urine Negative     Barbiturates Screen, Urine Negative     Oxycodone Screen, Urine Negative     Propoxyphene Screen Negative     Buprenorphine, Screen, Urine Negative    Narrative:       Limitations of this procedure include the possibility of false positives due to interfering substances in the urine sample. Clinical data should be correlated with any questionable result. Positive results should be considered Presumptive Positive until results are confirmed with another methodology such as HPLC or GCMS.    Lipase [[id number]]  (Normal) Collected:  09/09/17 1356    Specimen:  Blood Updated:  09/09/17 1558     Lipase 31 U/L           Radiology:    Imaging Results (last 72 hours)     Procedure Component Value Units Date/Time    CT Chest Pulmonary Embolism With Contrast [[id number]] Collected:  09/09/17 1634     Updated:  09/09/17 1636    Narrative:       FINAL REPORT    TECHNIQUE:  Thin section axial CT images were performed from the lung apices to the upper abdomen after the administration of IV contrast.  3-D and MIP reconstructions performed. This study was performed with techniques to keep radiation doses as low as reasonably   achievable (ALARA). Individualized dose reduction techniques using automated exposure control or adjustment of mA and/or kV according to the patient''s size were employed.    CLINICAL HISTORY:  SOA    FINDINGS:  Contrast bolus is somewhat limited. There is no evidence for pulmonary embolism. The thoracic aorta is patent without evidence of dissection. There is no axillary adenopathy. There is no mediastinal or hilar adenopathy. The heart  size is normal. There is   no pleural or pericardial effusion. There is bilateral perihilar groundglass airspace disease most consistent with pneumonia. Mycoplasma should be considered. Within the posterior, superior segment of the right lower lobe is an 8.5 mm pleural-based   nodule as well as a 5.5 mm noncalcified nodule seen laterally both on image #45 which are indeterminate. However, this is likely postinflammatory given patient's age.    Limited images of the upper abdomen are unremarkable.      Impression:       No evidence of pulmonary embolism or aortic dissection.    Bilateral perihilar groundglass airspace disease most consistent with pneumonia.    2 right lung pleural-based nodules, indeterminate. Favor post inflammatory given patient's age.    Authenticated by Dallas Alvarez MD on 09/09/2017 04:34:53 PM    XR Chest 2 View [[id number]] Resulted:  09/09/17 1731     Updated:  09/09/17 1731          Assessment:    Assessment/Plan     Principal Problem:    Pneumonia of both upper lobes due to infectious organism  Active Problems:    Respiratory failure with hypoxia    Neuropathic pain    Hypertension    Seizures    Myocardial infarction    COPD (chronic obstructive pulmonary disease)        Plan:     1.  Patient with the pneumonia of both lungs along with the acute hypoxic respiratory failure due to pneumonia.  He will be treated as a community-acquired pneumonia with Rocephin and Zithromax and also IV fluids and bronchodilators will be used   Along with mucolytic agent.  2.  Hypertension which is uncontrolled rate we'll start with the Norvasc Coreg and increase her lisinopril dose and has not had his dose today so we'll give his a regular meds and if still uncontrolled may add nitroglycerin.  Continue rest of his home meds and will get the complete list and updated today as he doesn't have his current medicine.  David Garg MD  09/09/17  8:42 PM    Please note that portions of this note may have been  completed with a voice recognition program. Efforts were made to edit the dictations, but occasionally words are mistranscribed.

## 2017-09-10 NOTE — PROGRESS NOTES
Discharge Planning Assessment   Basilio     Patient Name: Tesfaye Ang  MRN: [id number]  Today's Date: 9/10/2017    Admit Date: 9/9/2017          Discharge Needs Assessment     None            Discharge Plan       09/10/17 1314    Case Management/Social Work Plan    Plan Spoke with patient for DC planning.  He is  and lives with his wife.  Indpendent prior to admit.  Uses a cane.  Denies other DME or home health.  Does not have a POA.  Demos, contact info, PCP confirmed as correct.  CM will continue to follow for any needs.     Patient/Family In Agreement With Plan yes        Discharge Placement     No information found                Demographic Summary     None            Functional Status     None            Psychosocial     None            Abuse/Neglect     None            Legal     None            Substance Abuse     None            Patient Forms     None          Erika Yanez

## 2017-09-10 NOTE — PROGRESS NOTES
"    [facility]     LOS: 0 days    Patient Care Team:  Karlee Thomas MD as PCP - General (Family Medicine)    Chief Complaint:    Chief Complaint   Patient presents with   • Shortness of Breath       Subjective   I have reviewed labs/imaging/records from this hospitalization, including ER staff and admitting/attending physicians H/P's and progress notes to establish a comprehensive understanding of this patient's clinical hospital course, as well as to establish plan of care appropriately.     Patient seen and examined this morning.  Events from last night noted.  Patient denies having any fevers chills. He still feels fairly weak.  No nausea or vomiting no abdominal pain.  Denies any chest pain, he does feel that he has significant shortness of breath as well as cough denies any sputum production.  There is no significant edema.   Patient also denies having new onset weakness of numbness of either extremity.      Review of Systems:    The pertinent  ROS was done and it is noted above, rest  was negative.    Objective     Vital Signs  /95 (BP Location: Right arm, Patient Position: Lying)  Pulse 88  Temp 97.6 °F (36.4 °C) (Oral)   Resp 20  Ht 66\" (167.6 cm)  Wt 199 lb 4.8 oz (90.4 kg)  SpO2 (!) 88%  BMI 32.17 kg/m2      I/O this shift:  In: 240 [P.O.:240]  Out: -     Intake/Output Summary (Last 24 hours) at 09/10/17 1636  Last data filed at 09/10/17 0800   Gross per 24 hour   Intake             4529 ml   Output              400 ml   Net             4129 ml       Physical Exam:    General Appearance: alert, oriented x 3, no acute distress,   HEENT: pupils round and reactive to light, oral mucosa dry, extra occular movements intact.  Neck: supple, no JVD, trachea midline  Lungs:Chest shape is normal. Breath sounds heard bilaterally Along with scattered wheezing all over the chest.    Heart: RRR, normal S1 and S2, no S3, no rub  Abdomen: soft, non-tender, no " palpable bladder, present bowel sounds to auscultation  Extremities: no edema, cyanosis or clubbing.   Neuro: normal speech and mental status, grossly non focal.     Results Review:      Results from last 7 days  Lab Units 09/10/17  0606 09/09/17  1356   SODIUM mmol/L 141 140   POTASSIUM mmol/L 3.9 3.6   CHLORIDE mmol/L 112* 107   CO2 mmol/L 20.0* 19.0*   BUN mg/dL 18 15   CREATININE mg/dL 0.70 0.90   CALCIUM mg/dL 8.8 9.3   BILIRUBIN mg/dL 0.3 0.8   ALK PHOS U/L 52 68   ALT (SGPT) U/L 35 35   AST (SGOT) U/L 31 30   GLUCOSE mg/dL 138* 122*       Estimated Creatinine Clearance: 146.1 mL/min (by C-G formula based on Cr of 0.7).                  Results from last 7 days  Lab Units 09/10/17  0606 09/09/17  1356   WBC 10*3/mm3 18.29* 16.72*   HEMOGLOBIN g/dL 15.6 17.4   PLATELETS 10*3/mm3 174 188               Imaging Results (last 24 hours)     Procedure Component Value Units Date/Time    CT Chest Pulmonary Embolism With Contrast [[id number]] Collected:  09/09/17 1634     Updated:  09/09/17 1636    Narrative:       FINAL REPORT    TECHNIQUE:  Thin section axial CT images were performed from the lung apices to the upper abdomen after the administration of IV contrast.  3-D and MIP reconstructions performed. This study was performed with techniques to keep radiation doses as low as reasonably   achievable (ALARA). Individualized dose reduction techniques using automated exposure control or adjustment of mA and/or kV according to the patient''s size were employed.    CLINICAL HISTORY:  SOA    FINDINGS:  Contrast bolus is somewhat limited. There is no evidence for pulmonary embolism. The thoracic aorta is patent without evidence of dissection. There is no axillary adenopathy. There is no mediastinal or hilar adenopathy. The heart size is normal. There is   no pleural or pericardial effusion. There is bilateral perihilar groundglass airspace disease most consistent with pneumonia. Mycoplasma should be considered. Within the  posterior, superior segment of the right lower lobe is an 8.5 mm pleural-based   nodule as well as a 5.5 mm noncalcified nodule seen laterally both on image #45 which are indeterminate. However, this is likely postinflammatory given patient's age.    Limited images of the upper abdomen are unremarkable.      Impression:       No evidence of pulmonary embolism or aortic dissection.    Bilateral perihilar groundglass airspace disease most consistent with pneumonia.    2 right lung pleural-based nodules, indeterminate. Favor post inflammatory given patient's age.    Authenticated by Dallas Alvarez MD on 09/09/2017 04:34:53 PM    XR Chest 2 View [[id number]] Collected:  09/10/17 0852     Updated:  09/10/17 0852    Narrative:       2-VIEW CHEST     INDICATION: Shortness of breath.     FINDINGS: 2 views of the chest, compared with 02/24/2017. EKG leads  overlie the chest. No pneumothorax. Lung volumes are diminished with  some perihilar infiltrates.       Impression:       Mild perihilar infiltrates which may represent vascular  congestion or developing infiltrate from pneumonia. Follow-up PA and  lateral views recommended.          amLODIPine 10 mg Oral Daily   ceftriaxone 1 g Intravenous Q24H   And      azithromycin 500 mg Intravenous Q24H   carvedilol 25 mg Oral BID With Meals   CloNIDine 0.2 mg Oral Q12H   enoxaparin 40 mg Subcutaneous Daily   hydrochlorothiazide 25 mg Oral Daily   ipratropium-albuterol 3 mL Nebulization Q6H While Awake - RT   lisinopril 40 mg Oral Daily       sodium chloride 100 mL/hr Last Rate: 100 mL/hr (09/10/17 0615)       Medication Review:   Current Facility-Administered Medications   Medication Dose Route Frequency Provider Last Rate Last Dose   • acetaminophen (TYLENOL) 8 hr tablet 650 mg  650 mg Oral Q8H PRN David Garg MD       • acetaminophen (TYLENOL) tablet 650 mg  650 mg Oral Q4H PRN David Garg MD       • amLODIPine (NORVASC) tablet 10 mg  10 mg Oral Daily David Garg MD    10 mg at 09/10/17 0830   • cefTRIAXone (ROCEPHIN) 1 g/100 mL 0.9% NS VTB (mbp)  1 g Intravenous Q24H David Garg MD        And   • AZITHROMYCIN 500 MG/250 ML 0.9% NS IVPB (vial-mate)  500 mg Intravenous Q24H David Garg MD       • carvedilol (COREG) tablet 25 mg  25 mg Oral BID With Meals David Garg MD   25 mg at 09/10/17 0829   • CloNIDine (CATAPRES) tablet 0.2 mg  0.2 mg Oral Q12H David Garg MD   0.2 mg at 09/10/17 0250   • cyclobenzaprine (FLEXERIL) tablet 5 mg  5 mg Oral Nightly PRN David Garg MD       • enoxaparin (LOVENOX) syringe 40 mg  40 mg Subcutaneous Daily David Garg MD   40 mg at 09/10/17 0829   • hydrochlorothiazide (HYDRODIURIL) tablet 25 mg  25 mg Oral Daily David Garg MD   25 mg at 09/10/17 0829   • ipratropium-albuterol (DUO-NEB) nebulizer solution 3 mL  3 mL Nebulization Q6H While Awake - RT Edil Benson MD   3 mL at 09/10/17 1250   • lisinopril (PRINIVIL,ZESTRIL) tablet 40 mg  40 mg Oral Daily David Garg MD   40 mg at 09/10/17 0830   • magnesium hydroxide (MILK OF MAGNESIA) suspension 2400 mg/10mL 10 mL  10 mL Oral Daily PRN David Garg MD       • sodium chloride 0.9 % flush 10 mL  10 mL Intravenous PRN Jose Antonio Kendall MD       • sodium chloride 0.9 % infusion  100 mL/hr Intravenous Continuous David Garg  mL/hr at 09/10/17 0615 100 mL/hr at 09/10/17 0615       Assessment/Plan       Principal Problem:    Pneumonia of both upper lobes due to infectious organism  Active Problems:    Neuropathic pain    Hypertension    Seizures    Myocardial infarction    COPD (chronic obstructive pulmonary disease)    Respiratory failure with hypoxia    Plan:    I will go ahead and add nebulizers continue antibiotics.  Also stop the IV fluids as he is still eating that and drinking fairly well.   His pain is under fair control at this point.  Details were discussed with the patient as well as family in the room.    Further recommendations will depend on clinical  course of the patient during the current hospitalization.      I also discussed the details with the nursing staff.    Rest as ordered.    Edil Benson MD  09/10/17  4:36 PM    Please note that portions of this note may have been completed with a voice recognition program. Efforts were made to edit the dictations, but occasionally words are mistranscribed.

## 2017-09-10 NOTE — PLAN OF CARE
Problem: Pneumonia (Adult)  Goal: Signs and Symptoms of Listed Potential Problems Will be Absent or Manageable (Pneumonia)  Outcome: Ongoing (interventions implemented as appropriate)    Problem: Fall Risk (Adult)  Goal: Absence of Falls  Outcome: Ongoing (interventions implemented as appropriate)    Problem: Patient Care Overview (Adult)  Goal: Plan of Care Review  Outcome: Ongoing (interventions implemented as appropriate)    09/10/17 1710   Coping/Psychosocial Response Interventions   Plan Of Care Reviewed With patient   Patient Care Overview   Progress declining   Outcome Evaluation   Outcome Summary/Follow up Plan Pt's oxygenation has been declining throughout the day and he has been placed on venti mask (6L @ 50%)

## 2017-09-10 NOTE — PLAN OF CARE
Problem: Pneumonia (Adult)  Goal: Signs and Symptoms of Listed Potential Problems Will be Absent or Manageable (Pneumonia)  Outcome: Ongoing (interventions implemented as appropriate)  Goal: Signs and Symptoms of Listed Potential Problems Will be Absent or Manageable (Pneumonia)  Outcome: Ongoing (interventions implemented as appropriate)    Problem: Fall Risk (Adult)  Goal: Absence of Falls  Outcome: Ongoing (interventions implemented as appropriate)    Problem: Patient Care Overview (Adult)  Goal: Plan of Care Review  Outcome: Ongoing (interventions implemented as appropriate)    09/09/17 1930   Coping/Psychosocial Response Interventions   Plan Of Care Reviewed With patient   Patient Care Overview   Progress no change   Outcome Evaluation   Outcome Summary/Follow up Plan pleasant patient from ED-waiting on Dr. Garg to see patient-will continue to monitor patient       Goal: Adult Individualization and Mutuality  Outcome: Ongoing (interventions implemented as appropriate)  Goal: Discharge Needs Assessment  Outcome: Ongoing (interventions implemented as appropriate)

## 2017-09-11 ENCOUNTER — APPOINTMENT (OUTPATIENT)
Dept: GENERAL RADIOLOGY | Facility: HOSPITAL | Age: 42
End: 2017-09-11

## 2017-09-11 LAB
ALBUMIN SERPL-MCNC: 3.6 G/DL (ref 3.5–5)
ALBUMIN/GLOB SERPL: 1.1 G/DL (ref 1–2)
ALP SERPL-CCNC: 59 U/L (ref 38–126)
ALT SERPL W P-5'-P-CCNC: 31 U/L (ref 13–69)
ANION GAP SERPL CALCULATED.3IONS-SCNC: 15.1 MMOL/L
AST SERPL-CCNC: 64 U/L (ref 15–46)
BASOPHILS # BLD AUTO: 0.02 10*3/MM3 (ref 0–0.2)
BASOPHILS NFR BLD AUTO: 0.1 % (ref 0–2.5)
BILIRUB SERPL-MCNC: 0.6 MG/DL (ref 0.2–1.3)
BUN BLD-MCNC: 23 MG/DL (ref 7–20)
BUN/CREAT SERPL: 25.6 (ref 6.3–21.9)
CALCIUM SPEC-SCNC: 8.9 MG/DL (ref 8.4–10.2)
CHLORIDE SERPL-SCNC: 109 MMOL/L (ref 98–107)
CO2 SERPL-SCNC: 17 MMOL/L (ref 26–30)
CREAT BLD-MCNC: 0.9 MG/DL (ref 0.6–1.3)
DEPRECATED RDW RBC AUTO: 49.2 FL (ref 37–54)
EOSINOPHIL # BLD AUTO: 0 10*3/MM3 (ref 0–0.7)
EOSINOPHIL NFR BLD AUTO: 0 % (ref 0–7)
ERYTHROCYTE [DISTWIDTH] IN BLOOD BY AUTOMATED COUNT: 15.2 % (ref 11.5–14.5)
GFR SERPL CREATININE-BSD FRML MDRD: 93 ML/MIN/1.73
GLOBULIN UR ELPH-MCNC: 3.2 GM/DL
GLUCOSE BLD-MCNC: 126 MG/DL (ref 74–98)
HCT VFR BLD AUTO: 47.3 % (ref 42–52)
HGB BLD-MCNC: 14.9 G/DL (ref 14–18)
IMM GRANULOCYTES # BLD: 0.16 10*3/MM3 (ref 0–0.06)
IMM GRANULOCYTES NFR BLD: 0.8 % (ref 0–0.6)
LYMPHOCYTES # BLD AUTO: 0.83 10*3/MM3 (ref 0.6–3.4)
LYMPHOCYTES NFR BLD AUTO: 4.2 % (ref 10–50)
MCH RBC QN AUTO: 27.8 PG (ref 27–31)
MCHC RBC AUTO-ENTMCNC: 31.5 G/DL (ref 30–37)
MCV RBC AUTO: 88.2 FL (ref 80–94)
MONOCYTES # BLD AUTO: 0.26 10*3/MM3 (ref 0–0.9)
MONOCYTES NFR BLD AUTO: 1.3 % (ref 0–12)
NEUTROPHILS # BLD AUTO: 18.72 10*3/MM3 (ref 2–6.9)
NEUTROPHILS NFR BLD AUTO: 93.6 % (ref 37–80)
NRBC BLD MANUAL-RTO: 0 /100 WBC (ref 0–0)
PLATELET # BLD AUTO: 173 10*3/MM3 (ref 130–400)
PMV BLD AUTO: 10.3 FL (ref 6–12)
POTASSIUM BLD-SCNC: 5.1 MMOL/L (ref 3.5–5.1)
PROT SERPL-MCNC: 6.8 G/DL (ref 6.3–8.2)
RBC # BLD AUTO: 5.36 10*6/MM3 (ref 4.7–6.1)
SODIUM BLD-SCNC: 136 MMOL/L (ref 137–145)
TROPONIN I SERPL-MCNC: <0.012 NG/ML (ref 0–0.03)
WBC NRBC COR # BLD: 19.99 10*3/MM3 (ref 4.8–10.8)

## 2017-09-11 PROCEDURE — 94799 UNLISTED PULMONARY SVC/PX: CPT

## 2017-09-11 PROCEDURE — 25010000002 AZITHROMYCIN: Performed by: INTERNAL MEDICINE

## 2017-09-11 PROCEDURE — 84484 ASSAY OF TROPONIN QUANT: CPT | Performed by: INTERNAL MEDICINE

## 2017-09-11 PROCEDURE — 99222 1ST HOSP IP/OBS MODERATE 55: CPT | Performed by: INTERNAL MEDICINE

## 2017-09-11 PROCEDURE — 87449 NOS EACH ORGANISM AG IA: CPT | Performed by: INTERNAL MEDICINE

## 2017-09-11 PROCEDURE — 25010000002 METHYLPREDNISOLONE PER 125 MG: Performed by: INTERNAL MEDICINE

## 2017-09-11 PROCEDURE — 71010 HC CHEST PA OR AP: CPT

## 2017-09-11 PROCEDURE — 25010000002 CEFTRIAXONE: Performed by: INTERNAL MEDICINE

## 2017-09-11 PROCEDURE — 85025 COMPLETE CBC W/AUTO DIFF WBC: CPT | Performed by: INTERNAL MEDICINE

## 2017-09-11 PROCEDURE — 87899 AGENT NOS ASSAY W/OPTIC: CPT | Performed by: INTERNAL MEDICINE

## 2017-09-11 PROCEDURE — 80053 COMPREHEN METABOLIC PANEL: CPT | Performed by: INTERNAL MEDICINE

## 2017-09-11 PROCEDURE — 87081 CULTURE SCREEN ONLY: CPT | Performed by: INTERNAL MEDICINE

## 2017-09-11 PROCEDURE — 94660 CPAP INITIATION&MGMT: CPT

## 2017-09-11 PROCEDURE — 25010000002 ENOXAPARIN PER 10 MG: Performed by: INTERNAL MEDICINE

## 2017-09-11 RX ORDER — BUDESONIDE 0.5 MG/2ML
0.5 INHALANT ORAL
Status: DISCONTINUED | OUTPATIENT
Start: 2017-09-11 | End: 2017-09-20 | Stop reason: HOSPADM

## 2017-09-11 RX ORDER — METHYLPREDNISOLONE SODIUM SUCCINATE 125 MG/2ML
60 INJECTION, POWDER, LYOPHILIZED, FOR SOLUTION INTRAMUSCULAR; INTRAVENOUS EVERY 6 HOURS
Status: DISCONTINUED | OUTPATIENT
Start: 2017-09-11 | End: 2017-09-12

## 2017-09-11 RX ORDER — IPRATROPIUM BROMIDE AND ALBUTEROL SULFATE 2.5; .5 MG/3ML; MG/3ML
3 SOLUTION RESPIRATORY (INHALATION)
Status: DISCONTINUED | OUTPATIENT
Start: 2017-09-11 | End: 2017-09-18

## 2017-09-11 RX ORDER — CARVEDILOL 12.5 MG/1
12.5 TABLET ORAL 2 TIMES DAILY WITH MEALS
Status: DISCONTINUED | OUTPATIENT
Start: 2017-09-11 | End: 2017-09-20 | Stop reason: HOSPADM

## 2017-09-11 RX ADMIN — METHYLPREDNISOLONE SODIUM SUCCINATE 80 MG: 125 INJECTION, POWDER, FOR SOLUTION INTRAMUSCULAR; INTRAVENOUS at 04:23

## 2017-09-11 RX ADMIN — BUDESONIDE 0.5 MG: 0.5 INHALANT RESPIRATORY (INHALATION) at 19:18

## 2017-09-11 RX ADMIN — LISINOPRIL 40 MG: 20 TABLET ORAL at 08:50

## 2017-09-11 RX ADMIN — AZITHROMYCIN 500 MG: 500 INJECTION, POWDER, LYOPHILIZED, FOR SOLUTION INTRAVENOUS at 18:52

## 2017-09-11 RX ADMIN — ENOXAPARIN SODIUM 40 MG: 40 INJECTION SUBCUTANEOUS at 08:49

## 2017-09-11 RX ADMIN — CLONIDINE HYDROCHLORIDE 0.2 MG: 0.2 TABLET ORAL at 08:50

## 2017-09-11 RX ADMIN — IPRATROPIUM BROMIDE AND ALBUTEROL SULFATE 3 ML: .5; 3 SOLUTION RESPIRATORY (INHALATION) at 19:18

## 2017-09-11 RX ADMIN — HYDROCHLOROTHIAZIDE 25 MG: 25 TABLET ORAL at 08:50

## 2017-09-11 RX ADMIN — CARVEDILOL 12.5 MG: 12.5 TABLET, FILM COATED ORAL at 18:50

## 2017-09-11 RX ADMIN — METHYLPREDNISOLONE SODIUM SUCCINATE 60 MG: 125 INJECTION, POWDER, FOR SOLUTION INTRAMUSCULAR; INTRAVENOUS at 16:06

## 2017-09-11 RX ADMIN — IPRATROPIUM BROMIDE AND ALBUTEROL SULFATE 3 ML: .5; 3 SOLUTION RESPIRATORY (INHALATION) at 12:30

## 2017-09-11 RX ADMIN — IPRATROPIUM BROMIDE AND ALBUTEROL SULFATE 3 ML: .5; 3 SOLUTION RESPIRATORY (INHALATION) at 16:34

## 2017-09-11 RX ADMIN — AMLODIPINE BESYLATE 10 MG: 5 TABLET ORAL at 08:50

## 2017-09-11 RX ADMIN — CEFTRIAXONE 1 G: 1 INJECTION, POWDER, FOR SOLUTION INTRAMUSCULAR; INTRAVENOUS at 16:07

## 2017-09-11 RX ADMIN — IPRATROPIUM BROMIDE AND ALBUTEROL SULFATE 3 ML: .5; 3 SOLUTION RESPIRATORY (INHALATION) at 03:27

## 2017-09-11 RX ADMIN — CLONIDINE HYDROCHLORIDE 0.2 MG: 0.2 TABLET ORAL at 21:18

## 2017-09-11 RX ADMIN — IPRATROPIUM BROMIDE AND ALBUTEROL SULFATE 3 ML: .5; 3 SOLUTION RESPIRATORY (INHALATION) at 06:59

## 2017-09-11 RX ADMIN — METHYLPREDNISOLONE SODIUM SUCCINATE 60 MG: 125 INJECTION, POWDER, FOR SOLUTION INTRAMUSCULAR; INTRAVENOUS at 10:46

## 2017-09-11 RX ADMIN — METHYLPREDNISOLONE SODIUM SUCCINATE 60 MG: 125 INJECTION, POWDER, FOR SOLUTION INTRAMUSCULAR; INTRAVENOUS at 22:30

## 2017-09-11 NOTE — PLAN OF CARE
Problem: Patient Care Overview (Adult)  Goal: Plan of Care Review  Outcome: Ongoing (interventions implemented as appropriate)    09/11/17 1341   Coping/Psychosocial Response Interventions   Plan Of Care Reviewed With patient   Patient Care Overview   Progress no change         Problem: NPPV/CPAP (Adult)  Goal: Signs and Symptoms of Listed Potential Problems Will be Absent or Manageable (NPPV/CPAP)  Outcome: Ongoing (interventions implemented as appropriate)    09/11/17 1341   NPPV/CPAP   Problems Assessed (NPPV/CPAP) dry mucous membranes;skin breakdown   Problems Present (NPPV/CPAP) none

## 2017-09-11 NOTE — PLAN OF CARE
Problem: Pneumonia (Adult)  Goal: Signs and Symptoms of Listed Potential Problems Will be Absent or Manageable (Pneumonia)  Outcome: Ongoing (interventions implemented as appropriate)    Problem: Fall Risk (Adult)  Goal: Absence of Falls  Outcome: Ongoing (interventions implemented as appropriate)    Problem: Patient Care Overview (Adult)  Goal: Plan of Care Review  Outcome: Ongoing (interventions implemented as appropriate)    09/11/17 0509   Coping/Psychosocial Response Interventions   Plan Of Care Reviewed With patient   Patient Care Overview   Progress improving   Outcome Evaluation   Outcome Summary/Follow up Plan decreasing o2, pt is calm, vss         Problem: NPPV/CPAP (Adult)  Goal: Signs and Symptoms of Listed Potential Problems Will be Absent or Manageable (NPPV/CPAP)  Outcome: Ongoing (interventions implemented as appropriate)

## 2017-09-11 NOTE — NURSING NOTE
Pt not tolerating high flow at this time.  Pt states soa and feeling hot all over.  Dr Meehan and Dr Oconnor notified.  Pt placed back on bipap.  Will cont to monitor.

## 2017-09-11 NOTE — PLAN OF CARE
Problem: Pneumonia (Adult)  Goal: Signs and Symptoms of Listed Potential Problems Will be Absent or Manageable (Pneumonia)  Outcome: Ongoing (interventions implemented as appropriate)    09/11/17 1555   Pneumonia   Problems Assessed (Pneumonia) all;other (see comments)  (painful cough\)   Problems Present (Pneumonia) (Painful cough)       Goal: Signs and Symptoms of Listed Potential Problems Will be Absent or Manageable (Pneumonia)  Outcome: Ongoing (interventions implemented as appropriate)    09/11/17 1555   Pneumonia   Problems Assessed (Pneumonia) all;other (see comments)  (painful cough\)   Problems Present (Pneumonia) (Painful cough)         Problem: Fall Risk (Adult)  Goal: Absence of Falls  Outcome: Ongoing (interventions implemented as appropriate)    09/11/17 1555   Fall Risk (Adult)   Absence of Falls achieves outcome  (no falls at this time)         Problem: Patient Care Overview (Adult)  Goal: Plan of Care Review  Outcome: Ongoing (interventions implemented as appropriate)  Goal: Adult Individualization and Mutuality  Outcome: Ongoing (interventions implemented as appropriate)  Goal: Discharge Needs Assessment  Outcome: Ongoing (interventions implemented as appropriate)    Problem: NPPV/CPAP (Adult)  Goal: Signs and Symptoms of Listed Potential Problems Will be Absent or Manageable (NPPV/CPAP)  Outcome: Ongoing (interventions implemented as appropriate)    09/11/17 1555   NPPV/CPAP   Problems Assessed (NPPV/CPAP) all

## 2017-09-11 NOTE — CONSULTS
Date of consultation:   September 11, 2017    Requested by:   Hospitalist Service.     PCP: Karlee Thomas MD    Reason:  Acute hypoxic respiratory failure.  Abnormal CT of the chest    History of Present Illness:  41-year-old gentleman was admitted to the hospitalist service with cough and shortness of breath for about 3-4 days.  The patient is currently on BiPAP but was able to provide history.  The patient says that about 4 days before admission he started developing a dry cough which was mild in intensity and he was actually able to go to work.  On Friday he had extremely hard time at work and his wife brought him to the emergency room and he was admitted to the hospital service on Saturday.  The patient actually had a CT scan which was read as possible bilateral pneumonia.  The patient was apparently stable on the regular floor but became progressively hypoxic and had to be transferred to ICU.  Currently the patient is on BiPAP.    The patient had a sick colleague 3-4 days before he started having symptoms.  He denies any other sick contacts.  He also denies any exposure to new chemicals or anything unusual at work.  He works as a  but says that he was in his usual cab and there was no change anything at work, that he could remember.    Review of System:  All other review of systems negative except indicated in HPI     Past Medical History:  Past Medical History:   Diagnosis Date   • Arthritis    • CHF (congestive heart failure)    • Colon polyp    • COPD (chronic obstructive pulmonary disease)    • Fractures    • Heart attack    • Heart disease    • Heart failure    • History of bilateral inguinal hernias    • Hx of transfusion of whole blood    • Hypertension    • Kidney stone    • Myocardial infarction    • Obesity    • Seizures    • Stroke     TIA         Past Surgical History:  Past Surgical History:   Procedure Laterality Date   • APPENDECTOMY  05/2012   • CHOLECYSTECTOMY  12/2012   •  "COLON RESECTION     • HERNIA REPAIR  04/2016   • REVISION / TAKEDOWN COLOSTOMY           Family History:  Family History   Problem Relation Age of Onset   • Heart attack Mother 56   • Hypertension Mother    • Migraines Mother    • Stroke Mother    • Heart attack Father 39   • Hypertension Father    • Arthritis Paternal Grandmother    • Arthritis Paternal Grandfather          Social History:  Social History     Social History   • Marital status:      Spouse name: N/A   • Number of children: N/A   • Years of education: N/A     Social History Main Topics   • Smoking status: Current Every Day Smoker     Packs/day: 0.50     Types: Cigarettes   • Smokeless tobacco: None   • Alcohol use No      Comment: STOPPED 2001   • Drug use: No   • Sexual activity: Defer     Other Topics Concern   • None     Social History Narrative   • None         Physical Exam:  /87  Pulse 60  Temp 97.7 °F (36.5 °C) (Oral)   Resp (!) 38 Comment: pt had bipap off, eating.   Ht 66\" (167.6 cm)  Wt 204 lb 9.6 oz (92.8 kg)  SpO2 98%  BMI 33.02 kg/m2    Constitutional:            Vital signs reviewed            Patient is currently on BiPAP    Head/Face/Eyes:            Pupils appeared equal and reactive to light     ENT:             Patient was on the BiPAP      Neck:             Supple. No JVD noted.     Cardiovascular:              S1 + S2. Regular.              Left sided horizontal scar.      Respiratory:            Transmitted breath sounds bilaterally with air entry             Percussion could not be performed at this time.            Bilateral basal crackles.     Abdomen:            Soft.  Bowel sounds sluggishly positive. No obvious organomegaly noted.    Musculoskeletal/Extremities:             Gait could not be assessed at this time.              No clubbing in the upper extremities             No cyanosis noted in the upper extremities.             No edema noted in the lower extremities " bilaterally.    Neurologic/Psychiatric:             Was able to follow simple commands              Exam was limited since the patient was on BiPAP.    Skin:             No obvious rash noted.             Warm and dry.      Labs:   Reviewed. Pertinent labs were noted.     Lab Results   Component Value Date    WBC 19.99 (H) 09/11/2017    HGB 14.9 09/11/2017    HCT 47.3 09/11/2017    MCV 88.2 09/11/2017     09/11/2017       Lab Results   Component Value Date    GLUCOSE 126 (H) 09/11/2017    CALCIUM 8.9 09/11/2017     (L) 09/11/2017    K 5.1 09/11/2017    CO2 17.0 (L) 09/11/2017     (H) 09/11/2017    BUN 23 (H) 09/11/2017    CREATININE 0.90 09/11/2017    EGFRIFAFRI 113 03/02/2017    EGFRIFNONA 93 09/11/2017    BCR 25.6 (H) 09/11/2017    ANIONGAP 15.1 09/11/2017         ABG:  Lab Results   Component Value Date    PHART 7.430 09/10/2017    ZYD0HSV 31.7 (L) 09/10/2017    PO2ART 59.1 (L) 09/10/2017    HGBBG 16.3 09/10/2017    C8JEWNUI 90.8 09/10/2017           Imaging Study: Images reviewed personally and discussed with patient     Imaging Results (last 72 hours)     Procedure Component Value Units Date/Time    CT Chest Pulmonary Embolism With Contrast [[id number]] Collected:  09/09/17 1634     Updated:  09/09/17 1636    Narrative:       FINAL REPORT    TECHNIQUE:  Thin section axial CT images were performed from the lung apices to the upper abdomen after the administration of IV contrast.  3-D and MIP reconstructions performed. This study was performed with techniques to keep radiation doses as low as reasonably   achievable (ALARA). Individualized dose reduction techniques using automated exposure control or adjustment of mA and/or kV according to the patient''s size were employed.    CLINICAL HISTORY:  SOA    FINDINGS:  Contrast bolus is somewhat limited. There is no evidence for pulmonary embolism. The thoracic aorta is patent without evidence of dissection. There is no axillary adenopathy. There is  no mediastinal or hilar adenopathy. The heart size is normal. There is   no pleural or pericardial effusion. There is bilateral perihilar groundglass airspace disease most consistent with pneumonia. Mycoplasma should be considered. Within the posterior, superior segment of the right lower lobe is an 8.5 mm pleural-based   nodule as well as a 5.5 mm noncalcified nodule seen laterally both on image #45 which are indeterminate. However, this is likely postinflammatory given patient's age.    Limited images of the upper abdomen are unremarkable.      Impression:       No evidence of pulmonary embolism or aortic dissection.    Bilateral perihilar groundglass airspace disease most consistent with pneumonia.    2 right lung pleural-based nodules, indeterminate. Favor post inflammatory given patient's age.    Authenticated by Dallas Alvarez MD on 09/09/2017 04:34:53 PM    XR Chest 2 View [[id number]] Collected:  09/10/17 0852     Updated:  09/10/17 0852    Narrative:       2-VIEW CHEST     INDICATION: Shortness of breath.     FINDINGS: 2 views of the chest, compared with 02/24/2017. EKG leads  overlie the chest. No pneumothorax. Lung volumes are diminished with  some perihilar infiltrates.       Impression:       Mild perihilar infiltrates which may represent vascular  congestion or developing infiltrate from pneumonia. Follow-up PA and  lateral views recommended.    XR Chest 1 View [[id number]] Collected:  09/11/17 0724     Updated:  09/11/17 0727    Narrative:       PROCEDURE: XR CHEST 1 VW-     HISTORY: pneumonia; J18.9-Pneumonia, unspecified organism     COMPARISON: September 9, 2017.     FINDINGS: The heart is normal in size. The mediastinum is unremarkable.  There has been interval worsening in the patient's diffuse bilateral  airspace disease consistent with a pneumonia. No significant effusions  are evident. There is no pneumothorax.  There are no acute osseous  abnormalities.           Impression:       Worsening  bilateral airspace disease consistent with a  pneumonia.     Continued followup is recommended.     This report was finalized on 9/11/2017 7:25 AM by Simba Caballero M.D..          Assessment:  1. Acute Hypoxic Respiratory Failure.  2. Likely PNA.  3. Abnl CT.  4. Smoking.     Discussion/Recommendations:   I reviewed the patient's CT scan and it does seem to suggest infiltrative disease possibly pneumonia.    I do agree with current antibiotics.  I will try high flow nasal cannula and the patient seems to be doing fairly well on 60% FiO2 on the BiPAP.    I will also go ahead and send urinary antigens for strep and Legionella     I will continue the steroids although I will adjust the dose.    The patient was encouraged to consider quitting smoking     The plan was discussed with the patient.  I have also discussed the case with the nursing staff.    Recommendations were also discussed with the referring provider.     I would like to thank you for the opportunity to participate in the care of this patient.  We will communicate changes and recommendations, if and when necessary.      Elvis Oconnor MD  09/11/17  8:28 AM    Dictated utilizing Dragon dictation.

## 2017-09-11 NOTE — PROGRESS NOTES
[facility]    Name:  Tesfaye Ang   Age:  41 y.o.  Sex:  male  :  1975  MRN:  [id number]   Visit Number:  [id number]  Admission Date:  2017  Date Of Service:  17  Primary Care Physician:  Karlee Thomas MD     LOS: 1 day :  Patient Care Team:  Karlee Thomas MD as PCP - General (Family Medicine):    Chief Complaint:      Shortness of breath and dry cough.    Subjective / Interval History:     This is a 41-year-old male with history of coronary artery disease on medical therapy, hypertension was admitted from the emergency room on 2017 with increasing shortness of breath and dry cough of 3-4 days duration.  He was noted to have bilateral pneumonia on CT of the chest which was done in the emergency room.  He was admitted to the medical floor with oxygen and IV antibiotic therapy with Rocephin and azithromycin.  Patient initially did well but last night developed increasing shortness of breath and cough with significant hypoxia and was placed on nonrebreather mask but continued to have pulse ox saturations in the 80s.  He was tachycardic in the 120s.  He was noted to have worsening of his bilateral basal crackles and was subsequently transferred to the ICU and was placed on BiPAP therapy.  With BiPAP his hypoxia improved and is currently saturating in the upper 90s.    Patient is currently lying down on the bed and is on the BiPAP and is feeling better but continues to have dry cough.  He was also noted to have some bradycardia through the night.  Patient states that he has had problem with his heart rate with sometimes it going low.  He denies any previous history of significant hypoxia due to pneumonia, COPD or history of mechanical ventilation.  Patient works as a  and he has not had any recent changes.    Review of Systems:     General ROS: Patient denies any fevers, chills or loss of consciousness.  Respiratory ROS:  Shortness of breath and dry cough.  Cardiovascular ROS: Denies chest pain or palpitations. No history of exertional chest pain.  Gastrointestinal ROS: Denies nausea and vomiting. Denies any abdominal pain. No diarrhea.  Neurological ROS: Denies any focal weakness. No loss of consciousness. Denies any numbness. Denies neck pain.  Dermatological ROS: Denies any redness or pruritis.    Vital Signs:    Temp:  [97.1 °F (36.2 °C)-99.8 °F (37.7 °C)] 97.7 °F (36.5 °C)  Heart Rate:  [] 60  Resp:  [18-38] 38  BP: (110-164)/(80-95) 132/87    Intake and output:    I/O last 3 completed shifts:  In: 2679 [P.O.:1766; I.V.:913]  Out: 1100 [Urine:1100]  I/O this shift:  In: 150 [P.O.:150]  Out: -     Physical Examination:    General Appearance:  Alert and cooperative, in mild distress.   Head:  Atraumatic and normocephalic, without obvious abnormality.   Eyes:          PERRLA, conjunctivae and sclerae normal, no Icterus. No pallor. Extra-occular movements are within normal limits.   Neck: Supple, trachea midline, no thyromegaly, no carotid bruit.   Lungs:   Chest shape is normal. Breath sounds heard bilaterally equally.  No wheezing. Bilateral coarse crackles in the bases. No Pleural rub or bronchial breathing.   Heart:  Normal S1 and S2, no murmur, no gallop, no rub. No JVD   Abdomen:   Normal bowel sounds, no masses, no organomegaly. Soft       non-tender, non-distended, no guarding, no rebound tenderness.  Large midline surgical scar noted from his remote surgery for diaphragmatic hernia repair.   Extremities: Moves all extremities well, no edema, no cyanosis, no            clubbing.   Skin: No bleeding, bruising or rash.   Neurologic: Awake, alert and oriented times 3. Moves all 4 extremities equally.   Laboratory results:      Results from last 7 days  Lab Units 09/11/17  0414 09/10/17  0606 09/09/17  1356   SODIUM mmol/L 136* 141 140   POTASSIUM mmol/L 5.1 3.9 3.6   CHLORIDE mmol/L 109* 112* 107   CO2 mmol/L 17.0* 20.0*  19.0*   BUN mg/dL 23* 18 15   CREATININE mg/dL 0.90 0.70 0.90   CALCIUM mg/dL 8.9 8.8 9.3   BILIRUBIN mg/dL 0.6 0.3 0.8   ALK PHOS U/L 59 52 68   ALT (SGPT) U/L 31 35 35   AST (SGOT) U/L 64* 31 30   GLUCOSE mg/dL 126* 138* 122*       Results from last 7 days  Lab Units 09/11/17  0414 09/10/17  0606 09/09/17  1356   WBC 10*3/mm3 19.99* 18.29* 16.72*   HEMOGLOBIN g/dL 14.9 15.6 17.4   HEMATOCRIT % 47.3 46.7 51.8   PLATELETS 10*3/mm3 173 174 188           Results from last 7 days  Lab Units 09/09/17  1356   TROPONIN I ng/mL 0.021       Results from last 7 days  Lab Units 09/09/17  1440 09/09/17  1432   BLOODCX  No growth at 24 hours No growth at 24 hours       I have reviewed the patient's laboratory results.    Radiology results:    Imaging Results (last 24 hours)     Procedure Component Value Units Date/Time    XR Chest 2 View [[id number]] Collected:  09/10/17 0852     Updated:  09/10/17 0852    Narrative:       2-VIEW CHEST     INDICATION: Shortness of breath.     FINDINGS: 2 views of the chest, compared with 02/24/2017. EKG leads  overlie the chest. No pneumothorax. Lung volumes are diminished with  some perihilar infiltrates.       Impression:       Mild perihilar infiltrates which may represent vascular  congestion or developing infiltrate from pneumonia. Follow-up PA and  lateral views recommended.    XR Chest 1 View [[id number]] Collected:  09/11/17 0724     Updated:  09/11/17 0727    Narrative:       PROCEDURE: XR CHEST 1 VW-     HISTORY: pneumonia; J18.9-Pneumonia, unspecified organism     COMPARISON: September 9, 2017.     FINDINGS: The heart is normal in size. The mediastinum is unremarkable.  There has been interval worsening in the patient's diffuse bilateral  airspace disease consistent with a pneumonia. No significant effusions  are evident. There is no pneumothorax.  There are no acute osseous  abnormalities.           Impression:       Worsening bilateral airspace disease consistent with a  pneumonia.      Continued followup is recommended.     This report was finalized on 9/11/2017 7:25 AM by Simba Caballero M.D..        I have reviewed the patient's radiology reports.    Medication Review:     I have reviewed the patients active and prn medications.     Principal Problem:    Pneumonia of both upper lobes due to infectious organism  Active Problems:    Neuropathic pain    Hypertension    Seizures    Myocardial infarction    COPD (chronic obstructive pulmonary disease)    Respiratory failure with hypoxia      Assessment:    1.  Acute hypoxic respiratory failure, present on admission.  2.  Bilateral diffuse bacterial pneumonia, present on admission.  3.  Acute COPD exacerbation.  4.  Coronary artery disease on medical therapy.  5.  Essential hypertension.  6.  Seizure disorder.      Plan:    Patient is currently on BiPAP therapy and is doing fairly well.  He seems to be comfortable with the BiPAP and saturating in the upper 90s.  He has had some episodes of bradycardia and his blood pressure is fairly stable.  I will decrease the carvedilol to 12.5 mg twice daily.  He has mild elevation of troponin which is upper limit within the normal range.  He denies any chest pain.  We will repeat the troponin in a.m.    Patient is currently on IV antibiotic therapy with Rocephin and azithromycin for community-acquired pneumonia.  I have discussed the patient's condition with Dr. Oconnor from pulmonology and he has consulted the patient.  He has recommended to continue current treatment regimen with current antibiotics and continuation of steroid therapy.  He will be adjusting the BiPAP pressures and FiO2 levels accordingly.  We will continue to monitor him in the ICU.  Hopefully, his respiratory failure will improve and we can avoid mechanical ventilation.      Eulogio Moyer MD  09/11/17  8:22 AM    Portions of this note may have been completed with Dragon, a voice recognition program. Not all errors in transcription may have  been detected prior to signing.

## 2017-09-12 ENCOUNTER — APPOINTMENT (OUTPATIENT)
Dept: GENERAL RADIOLOGY | Facility: HOSPITAL | Age: 42
End: 2017-09-12

## 2017-09-12 LAB
ANION GAP SERPL CALCULATED.3IONS-SCNC: 10.2 MMOL/L
BASOPHILS # BLD AUTO: 0.03 10*3/MM3 (ref 0–0.2)
BASOPHILS NFR BLD AUTO: 0.2 % (ref 0–2.5)
BUN BLD-MCNC: 25 MG/DL (ref 7–20)
BUN/CREAT SERPL: 31.3 (ref 6.3–21.9)
CALCIUM SPEC-SCNC: 9.3 MG/DL (ref 8.4–10.2)
CHLORIDE SERPL-SCNC: 106 MMOL/L (ref 98–107)
CO2 SERPL-SCNC: 26 MMOL/L (ref 26–30)
CREAT BLD-MCNC: 0.8 MG/DL (ref 0.6–1.3)
DEPRECATED RDW RBC AUTO: 44.5 FL (ref 37–54)
EOSINOPHIL # BLD AUTO: 0 10*3/MM3 (ref 0–0.7)
EOSINOPHIL NFR BLD AUTO: 0 % (ref 0–7)
ERYTHROCYTE [DISTWIDTH] IN BLOOD BY AUTOMATED COUNT: 14.6 % (ref 11.5–14.5)
GFR SERPL CREATININE-BSD FRML MDRD: 107 ML/MIN/1.73
GLUCOSE BLD-MCNC: 125 MG/DL (ref 74–98)
HCT VFR BLD AUTO: 46.1 % (ref 42–52)
HGB BLD-MCNC: 15.2 G/DL (ref 14–18)
IMM GRANULOCYTES # BLD: 0.13 10*3/MM3 (ref 0–0.06)
IMM GRANULOCYTES NFR BLD: 0.7 % (ref 0–0.6)
LYMPHOCYTES # BLD AUTO: 0.79 10*3/MM3 (ref 0.6–3.4)
LYMPHOCYTES NFR BLD AUTO: 4.4 % (ref 10–50)
MCH RBC QN AUTO: 27.7 PG (ref 27–31)
MCHC RBC AUTO-ENTMCNC: 33 G/DL (ref 30–37)
MCV RBC AUTO: 84.1 FL (ref 80–94)
MONOCYTES # BLD AUTO: 0.49 10*3/MM3 (ref 0–0.9)
MONOCYTES NFR BLD AUTO: 2.8 % (ref 0–12)
NEUTROPHILS # BLD AUTO: 16.33 10*3/MM3 (ref 2–6.9)
NEUTROPHILS NFR BLD AUTO: 91.9 % (ref 37–80)
NRBC BLD MANUAL-RTO: 0 /100 WBC (ref 0–0)
PLATELET # BLD AUTO: 179 10*3/MM3 (ref 130–400)
PMV BLD AUTO: 10.4 FL (ref 6–12)
POTASSIUM BLD-SCNC: 4.2 MMOL/L (ref 3.5–5.1)
RBC # BLD AUTO: 5.48 10*6/MM3 (ref 4.7–6.1)
SODIUM BLD-SCNC: 138 MMOL/L (ref 137–145)
TROPONIN I SERPL-MCNC: <0.012 NG/ML (ref 0–0.03)
WBC NRBC COR # BLD: 17.77 10*3/MM3 (ref 4.8–10.8)

## 2017-09-12 PROCEDURE — 80048 BASIC METABOLIC PNL TOTAL CA: CPT | Performed by: INTERNAL MEDICINE

## 2017-09-12 PROCEDURE — 85025 COMPLETE CBC W/AUTO DIFF WBC: CPT | Performed by: INTERNAL MEDICINE

## 2017-09-12 PROCEDURE — 94799 UNLISTED PULMONARY SVC/PX: CPT

## 2017-09-12 PROCEDURE — 25010000002 CEFTRIAXONE: Performed by: INTERNAL MEDICINE

## 2017-09-12 PROCEDURE — 71010 HC CHEST PA OR AP: CPT

## 2017-09-12 PROCEDURE — 99233 SBSQ HOSP IP/OBS HIGH 50: CPT | Performed by: INTERNAL MEDICINE

## 2017-09-12 PROCEDURE — 25010000002 ENOXAPARIN PER 10 MG: Performed by: INTERNAL MEDICINE

## 2017-09-12 PROCEDURE — 84484 ASSAY OF TROPONIN QUANT: CPT | Performed by: INTERNAL MEDICINE

## 2017-09-12 PROCEDURE — 94660 CPAP INITIATION&MGMT: CPT

## 2017-09-12 PROCEDURE — 25010000002 AZITHROMYCIN: Performed by: INTERNAL MEDICINE

## 2017-09-12 PROCEDURE — 25010000002 METHYLPREDNISOLONE PER 125 MG: Performed by: INTERNAL MEDICINE

## 2017-09-12 PROCEDURE — 25010000002 METHYLPREDNISOLONE PER 40 MG: Performed by: INTERNAL MEDICINE

## 2017-09-12 RX ORDER — BENZONATATE 100 MG/1
200 CAPSULE ORAL 3 TIMES DAILY PRN
Status: DISCONTINUED | OUTPATIENT
Start: 2017-09-12 | End: 2017-09-20 | Stop reason: HOSPADM

## 2017-09-12 RX ORDER — METHYLPREDNISOLONE SODIUM SUCCINATE 40 MG/ML
40 INJECTION, POWDER, LYOPHILIZED, FOR SOLUTION INTRAMUSCULAR; INTRAVENOUS EVERY 6 HOURS
Status: DISCONTINUED | OUTPATIENT
Start: 2017-09-12 | End: 2017-09-17

## 2017-09-12 RX ADMIN — BUDESONIDE 0.5 MG: 0.5 INHALANT RESPIRATORY (INHALATION) at 07:14

## 2017-09-12 RX ADMIN — METHYLPREDNISOLONE SODIUM SUCCINATE 40 MG: 40 INJECTION, POWDER, FOR SOLUTION INTRAMUSCULAR; INTRAVENOUS at 13:23

## 2017-09-12 RX ADMIN — AMLODIPINE BESYLATE 10 MG: 5 TABLET ORAL at 09:20

## 2017-09-12 RX ADMIN — IPRATROPIUM BROMIDE AND ALBUTEROL SULFATE 3 ML: .5; 3 SOLUTION RESPIRATORY (INHALATION) at 13:09

## 2017-09-12 RX ADMIN — HYDROCHLOROTHIAZIDE 25 MG: 25 TABLET ORAL at 09:20

## 2017-09-12 RX ADMIN — CEFTRIAXONE 1 G: 1 INJECTION, POWDER, FOR SOLUTION INTRAMUSCULAR; INTRAVENOUS at 18:27

## 2017-09-12 RX ADMIN — IPRATROPIUM BROMIDE AND ALBUTEROL SULFATE 3 ML: .5; 3 SOLUTION RESPIRATORY (INHALATION) at 07:14

## 2017-09-12 RX ADMIN — BUDESONIDE 0.5 MG: 0.5 INHALANT RESPIRATORY (INHALATION) at 19:25

## 2017-09-12 RX ADMIN — IPRATROPIUM BROMIDE AND ALBUTEROL SULFATE 3 ML: .5; 3 SOLUTION RESPIRATORY (INHALATION) at 19:25

## 2017-09-12 RX ADMIN — CARVEDILOL 12.5 MG: 12.5 TABLET, FILM COATED ORAL at 09:20

## 2017-09-12 RX ADMIN — CLONIDINE HYDROCHLORIDE 0.2 MG: 0.2 TABLET ORAL at 09:20

## 2017-09-12 RX ADMIN — IPRATROPIUM BROMIDE AND ALBUTEROL SULFATE 3 ML: .5; 3 SOLUTION RESPIRATORY (INHALATION) at 00:32

## 2017-09-12 RX ADMIN — METHYLPREDNISOLONE SODIUM SUCCINATE 40 MG: 40 INJECTION, POWDER, FOR SOLUTION INTRAMUSCULAR; INTRAVENOUS at 18:28

## 2017-09-12 RX ADMIN — IPRATROPIUM BROMIDE AND ALBUTEROL SULFATE 3 ML: .5; 3 SOLUTION RESPIRATORY (INHALATION) at 04:07

## 2017-09-12 RX ADMIN — LISINOPRIL 40 MG: 20 TABLET ORAL at 09:20

## 2017-09-12 RX ADMIN — METHYLPREDNISOLONE SODIUM SUCCINATE 60 MG: 125 INJECTION, POWDER, FOR SOLUTION INTRAMUSCULAR; INTRAVENOUS at 06:33

## 2017-09-12 RX ADMIN — CARVEDILOL 12.5 MG: 12.5 TABLET, FILM COATED ORAL at 18:28

## 2017-09-12 RX ADMIN — CLONIDINE HYDROCHLORIDE 0.2 MG: 0.2 TABLET ORAL at 21:18

## 2017-09-12 RX ADMIN — HYDROCODONE POLISTIREX AND CHLORPHENIRAMINE POLISTIREX 5 ML: 10; 8 SUSPENSION, EXTENDED RELEASE ORAL at 13:35

## 2017-09-12 RX ADMIN — ENOXAPARIN SODIUM 40 MG: 40 INJECTION SUBCUTANEOUS at 09:19

## 2017-09-12 RX ADMIN — AZITHROMYCIN 500 MG: 500 INJECTION, POWDER, LYOPHILIZED, FOR SOLUTION INTRAVENOUS at 18:27

## 2017-09-12 RX ADMIN — BENZONATATE 200 MG: 100 CAPSULE, LIQUID FILLED ORAL at 21:26

## 2017-09-12 NOTE — PLAN OF CARE
Problem: Patient Care Overview (Adult)  Goal: Plan of Care Review    09/12/17 1745   Coping/Psychosocial Response Interventions   Plan Of Care Reviewed With patient   Patient Care Overview   Progress no change         Problem: NPPV/CPAP (Adult)  Intervention: Assess/Manage Patient Tolerance of Noninvasive Ventilation    09/12/17 1745   Respiratory Interventions   Airway/Ventilation Management airway patency maintained         Goal: Signs and Symptoms of Listed Potential Problems Will be Absent or Manageable (NPPV/CPAP)  Outcome: Ongoing (interventions implemented as appropriate)    09/12/17 1745   NPPV/CPAP   Problems Assessed (NPPV/CPAP) hypoxia/hypoxemia   Problems Present (NPPV/CPAP) hypoxia/hypoxemia

## 2017-09-12 NOTE — PLAN OF CARE
Problem: Pneumonia (Adult)  Goal: Signs and Symptoms of Listed Potential Problems Will be Absent or Manageable (Pneumonia)  Outcome: Ongoing (interventions implemented as appropriate)    09/12/17 1622   Pneumonia   Problems Assessed (Pneumonia) all   Problems Present (Pneumonia) (painful cough)       Goal: Signs and Symptoms of Listed Potential Problems Will be Absent or Manageable (Pneumonia)  Outcome: Ongoing (interventions implemented as appropriate)    09/12/17 1622   Pneumonia   Problems Assessed (Pneumonia) all   Problems Present (Pneumonia) (painful cough)         Problem: Fall Risk (Adult)  Goal: Absence of Falls  Outcome: Outcome(s) achieved Date Met:  09/12/17 09/12/17 1622   Fall Risk (Adult)   Absence of Falls (no falls this shift)         Problem: Patient Care Overview (Adult)  Goal: Plan of Care Review  Outcome: Ongoing (interventions implemented as appropriate)  Goal: Adult Individualization and Mutuality  Outcome: Ongoing (interventions implemented as appropriate)  Goal: Discharge Needs Assessment  Outcome: Ongoing (interventions implemented as appropriate)    Problem: NPPV/CPAP (Adult)  Goal: Signs and Symptoms of Listed Potential Problems Will be Absent or Manageable (NPPV/CPAP)  Outcome: Ongoing (interventions implemented as appropriate)    09/12/17 1622   NPPV/CPAP   Problems Assessed (NPPV/CPAP) all   Problems Present (NPPV/CPAP) other (see comments)  (pt using high flow. only using bipap if fatigued. )

## 2017-09-12 NOTE — PLAN OF CARE
Problem: Pneumonia (Adult)  Goal: Signs and Symptoms of Listed Potential Problems Will be Absent or Manageable (Pneumonia)  Outcome: Ongoing (interventions implemented as appropriate)    09/12/17 0646   Pneumonia   Problems Assessed (Pneumonia) all   Problems Present (Pneumonia) respiratory compromise         Problem: Fall Risk (Adult)  Goal: Absence of Falls  Outcome: Ongoing (interventions implemented as appropriate)

## 2017-09-12 NOTE — PLAN OF CARE
Problem: NPPV/CPAP (Adult)  Goal: Signs and Symptoms of Listed Potential Problems Will be Absent or Manageable (NPPV/CPAP)  Outcome: Ongoing (interventions implemented as appropriate)    09/12/17 0459   NPPV/CPAP   Problems Assessed (NPPV/CPAP) dry mucous membranes;situational response;hypoxia/hypoxemia;skin breakdown   Problems Present (NPPV/CPAP) none

## 2017-09-12 NOTE — PROGRESS NOTES
"  CC: Acute Respiratory Failure. PNA.     S: Continues to be Short of breath. Continues to have dry cough. Is C/O diffuse lower chest wall pain, when he coughs.    O:Vital signs reviewed. O2Sat: 90 % on 100% (22LPM).   /86  Pulse 56  Temp 96.9 °F (36.1 °C) (Axillary)   Resp 16  Ht 66\" (167.6 cm)  Wt 208 lb 11.2 oz (94.7 kg)  SpO2 95%  BMI 33.69 kg/m2      I & Os reviewed.   Intake/Output       09/11/17 0700 - 09/12/17 0659    Intake (ml) 1200    Output (ml) 1950    Net (ml) -750    Last Weight 208 lb 11.2 oz (94.7 kg)          General: Moderate acute distress noted.  Eyes: PERRL. EOM intact.  Neck: Supple without JVD  Cardiovascular: S1 + S2. Regular.   Respiratory: Moderate respiratory distress noted. No wheezing heard. B/L basal crackles noted  Abdomen: Soft. Bowel sounds positive   Extremities: No edema noted.  Neurologic: AAOx3. Was able to follow commands.    Skin: Appeared somewhat dry     Labs: Reviewed.       Results from last 7 days  Lab Units 09/12/17  0607 09/11/17  0414 09/10/17  0606 09/09/17  1356   SODIUM mmol/L 138 136* 141 140   POTASSIUM mmol/L 4.2 5.1 3.9 3.6   CHLORIDE mmol/L 106 109* 112* 107   CO2 mmol/L 26.0 17.0* 20.0* 19.0*   BUN mg/dL 25* 23* 18 15   CREATININE mg/dL 0.80 0.90 0.70 0.90   CALCIUM mg/dL 9.3 8.9 8.8 9.3   BILIRUBIN mg/dL  --  0.6 0.3 0.8   ALK PHOS U/L  --  59 52 68   ALT (SGPT) U/L  --  31 35 35   AST (SGOT) U/L  --  64* 31 30   GLUCOSE mg/dL 125* 126* 138* 122*             Results from last 7 days  Lab Units 09/12/17  0607 09/11/17  0414 09/10/17  0606 09/09/17  1356   WBC 10*3/mm3 17.77* 19.99* 18.29* 16.72*   HEMOGLOBIN g/dL 15.2 14.9 15.6 17.4   PLATELETS 10*3/mm3 179 173 174 188       CXRay: Reviewed personally.   Imaging Results (last 24 hours)     Procedure Component Value Units Date/Time    XR Chest 1 View [[id number]] Collected:  09/12/17 0801     Updated:  09/12/17 0804    Narrative:       PROCEDURE: XR CHEST 1 VW-     HISTORY: Resp Failure.; " J18.9-Pneumonia, unspecified organism     COMPARISON: 9/11/2017.     FINDINGS: The heart is normal in size. The mediastinum is unremarkable.  There is diffuse bilateral airspace disease, however there is improved  aeration of the right lung base compared to the prior exam. There is no  pneumothorax.  There are no acute osseous abnormalities.           Impression:       Improved aeration of the right lung base with persistent  diffuse bilateral opacities.     Continued followup is recommended.     This report was finalized on 9/12/2017 8:02 AM by Simba Caballero M.D..            Assessment & Recommendations/Plan:   1. Acute Hypoxic Respiratory Failure.    2. Likely PNA.    3. Abnl CT.    4. Smoking.     The patient continues to have significant hypoxia.  He seemed to have done somewhat better with a flow rate increased to 30 L/m.  I would recommend that the patient uses BiPAP as needed.    I would recommend continuation of current antibiotic regimen.  Culture data is pending.    His cough remains mostly dry.    I would also recommend that he continues to be monitored in the intensive care unit, since his condition remains critical but stable, especially due to significant respiratory failure.    CXRay and ?ABG will be ordered for tomorrow AM.    We have reviewed patient's current orders and changes, if any, have been suggested to primary care team. Plan was also discussed with nursing staff, as necessary.     Elvis Oconnor MD  09/12/17  8:44 AM    Dictated utilizing Dragon dictation.

## 2017-09-12 NOTE — PROGRESS NOTES
[facility]    Name:  Tesfaye Ang   Age:  41 y.o.  Sex:  male  :  1975  MRN:  [id number]   Visit Number:  [id number]  Admission Date:  2017  Date Of Service:  17  Primary Care Physician:  Karlee Thomas MD     LOS: 2 days :  Patient Care Team:  Karlee Thomas MD as PCP - General (Family Medicine):    History taken from:     patient    Chief Complaint:      Shortness of breath  Subjective     Interval History:     Patient seen today.  Patient was admitted for shortness of breath for 3-4 days prior to admission.  He had a dry cough which progressed to worsening shortness of breath and worsening cough.  CT scan showed bilateral pneumonia.  He has been moved to the ICU as he has become progressively hypoxic.  Dr. Oconnor is following the patient, he is on high flow oxygen as well as BiPAP.  Patient denies any chest pressure, nausea, vomiting, or pain.  His worst complaint is cough at the present time which is coming on in paroxysms.  Have added Tussionex as well as Tessalon Perles.  Patient is still quite hypoxic, and retires stay in the ICU.  Reviewed history and physical, x-rays, consults, and lab work.    Review of Systems:     All systems were reviewed and negative except for:  Respiratory: positive for  cough, dry and shortness of air    Objective     Vital Signs:    Temp:  [96.9 °F (36.1 °C)-97.8 °F (36.6 °C)] 97.8 °F (36.6 °C)  Heart Rate:  [52-98] 65  Resp:  [16-28] 20  BP: (108-155)/() 129/86    Physical Exam:      General Appearance:    Alert, cooperative, in no acute distress   Head:    Normocephalic, without obvious abnormality, atraumatic   Eyes:            Lids and lashes normal, conjunctivae and sclerae normal, no   icterus, no pallor, corneas clear, PERRLA   Ears:    Ears appear intact with no abnormalities noted   Throat:   No oral lesions, no thrush, oral mucosa moist   Neck:   No adenopathy, supple, trachea  midline, no thyromegaly, no   carotid bruit, no JVD   Back:     No kyphosis present, no scoliosis present, no skin lesions,      erythema or scars, no tenderness to percussion or                   palpation,   range of motion normal   Lungs:     Rhonchi bilaterally without uses accessory muscles respiration.      Heart:    Regular rhythm and normal rate, normal S1 and S2, no            murmur, no gallop, no rub, no click   Chest Wall:    No abnormalities observed   Abdomen:     Normal bowel sounds, no masses, no organomegaly, soft        non-tender, non-distended, no guarding, no rebound                tenderness   Rectal:     Deferred   Extremities:   Moves all extremities well, no edema, no cyanosis, no             redness   Pulses:   Pulses palpable and equal bilaterally   Skin:   No bleeding, bruising or rash   Lymph nodes:   No palpable adenopathy   Neurologic:   Cranial nerves 2 - 12 grossly intact, sensation intact, DTR       present and equal bilaterally          Results Review:      I reviewed the patient's new clinical results.    Labs:  Lab Results (last 24 hours)     Procedure Component Value Units Date/Time    Blood Culture [[id number]]  (Normal) Collected:  09/09/17 1440    Specimen:  Blood from Hand, Right Updated:  09/11/17 1601     Blood Culture No growth at 2 days    Blood Culture [[id number]]  (Normal) Collected:  09/09/17 1432    Specimen:  Blood from Hand, Left Updated:  09/11/17 1601     Blood Culture No growth at 2 days    CBC & Differential [[id number]] Collected:  09/12/17 0607    Specimen:  Blood Updated:  09/12/17 0645    Narrative:       The following orders were created for panel order CBC & Differential.  Procedure                               Abnormality         Status                     ---------                               -----------         ------                     CBC Auto Differential[[id number]]        Abnormal            Final result                 Please view results for  these tests on the individual orders.    CBC Auto Differential [[id number]]  (Abnormal) Collected:  09/12/17 0607    Specimen:  Blood Updated:  09/12/17 0645     WBC 17.77 (H) 10*3/mm3      RBC 5.48 10*6/mm3      Hemoglobin 15.2 g/dL      Hematocrit 46.1 %      MCV 84.1 fL      MCH 27.7 pg      MCHC 33.0 g/dL      RDW 14.6 (H) %      RDW-SD 44.5 fl      MPV 10.4 fL      Platelets 179 10*3/mm3      Neutrophil % 91.9 (H) %      Lymphocyte % 4.4 (L) %      Monocyte % 2.8 %      Eosinophil % 0.0 %      Basophil % 0.2 %      Immature Grans % 0.7 (H) %      Neutrophils, Absolute 16.33 (H) 10*3/mm3      Lymphocytes, Absolute 0.79 10*3/mm3      Monocytes, Absolute 0.49 10*3/mm3      Eosinophils, Absolute 0.00 10*3/mm3      Basophils, Absolute 0.03 10*3/mm3      Immature Grans, Absolute 0.13 (H) 10*3/mm3      nRBC 0.0 /100 WBC     Basic Metabolic Panel [[id number]]  (Abnormal) Collected:  09/12/17 0607    Specimen:  Blood Updated:  09/12/17 0652     Glucose 125 (H) mg/dL      BUN 25 (H) mg/dL      Creatinine 0.80 mg/dL      Sodium 138 mmol/L      Potassium 4.2 mmol/L      Chloride 106 mmol/L      CO2 26.0 mmol/L      Calcium 9.3 mg/dL      eGFR Non African Amer 107 mL/min/1.73      BUN/Creatinine Ratio 31.3 (H)     Anion Gap 10.2 mmol/L     Narrative:       Abnormal estimated GFR should be followed by more specific studies to confirm end stage chronic renal disease. The equation used for calculation may not be accurate for patients less than 19 years old, greater than 70 years old, patients at extremes of weight, malnutrition, or with acute renal dysfunction.    Troponin [[id number]]  (Normal) Collected:  09/12/17 0607    Specimen:  Blood Updated:  09/12/17 0652     Troponin I <0.012 ng/mL     Narrative:       Normal Patient Upper Reference Limit (URL) (99th Percentile)=0.03 ng/mL   Non-AMI Illness Reference Limit=0.03-0.11 ng/mL   AMI Confirmation=0.12 ng/mL and above           Radiology:    Imaging Results (last 24 hours)      Procedure Component Value Units Date/Time    XR Chest 1 View [[id number]] Collected:  09/12/17 0801     Updated:  09/12/17 0804    Narrative:       PROCEDURE: XR CHEST 1 VW-     HISTORY: Resp Failure.; J18.9-Pneumonia, unspecified organism     COMPARISON: 9/11/2017.     FINDINGS: The heart is normal in size. The mediastinum is unremarkable.  There is diffuse bilateral airspace disease, however there is improved  aeration of the right lung base compared to the prior exam. There is no  pneumothorax.  There are no acute osseous abnormalities.           Impression:       Improved aeration of the right lung base with persistent  diffuse bilateral opacities.     Continued followup is recommended.     This report was finalized on 9/12/2017 8:02 AM by Simba Caballero M.D..          Medication Review:       amLODIPine 10 mg Oral Daily   ceftriaxone 1 g Intravenous Q24H   And      azithromycin 500 mg Intravenous Q24H   budesonide 0.5 mg Nebulization BID - RT   carvedilol 12.5 mg Oral BID With Meals   CloNIDine 0.2 mg Oral Q12H   enoxaparin 40 mg Subcutaneous Daily   hydrochlorothiazide 25 mg Oral Daily   ipratropium-albuterol 3 mL Nebulization Q4H - RT   lisinopril 40 mg Oral Daily   methylPREDNISolone sodium succinate 40 mg Intravenous Q6H            Assessment/Plan     Problem List Items Addressed This Visit     * (Principal)Pneumonia of both upper lobes due to infectious organism - Primary          1.  Acute hypoxic respiratory failure, present on admission.  2.  Bilateral diffuse bacterial pneumonia, present on admission.  3.  Acute COPD exacerbation.  4.  Coronary artery disease on medical therapy.  5.  Essential hypertension.  6.  Seizure disorder.      Plan:    Continue with high flow oxygen.  Continue with Rocephin and Zithromax.  If no improvement by tomorrow, we will broaden antibiotic spectrum.  Added Tussionex as well as Tessalon Perles.  Repeat troponins were negative.  Echo lab work in the a.m.  Continue to  monitor the patient in the ICU.  Continue with Solu-Medrol as well as DuoNeb.  Further recommendations will depend on clinical course.    Josemanuel Downs DO  09/12/17  2:27 PM

## 2017-09-13 ENCOUNTER — APPOINTMENT (OUTPATIENT)
Dept: GENERAL RADIOLOGY | Facility: HOSPITAL | Age: 42
End: 2017-09-13

## 2017-09-13 ENCOUNTER — APPOINTMENT (OUTPATIENT)
Dept: CARDIOLOGY | Facility: HOSPITAL | Age: 42
End: 2017-09-13
Attending: INTERNAL MEDICINE

## 2017-09-13 LAB
ALBUMIN SERPL-MCNC: 3.2 G/DL (ref 3.5–5)
ANION GAP SERPL CALCULATED.3IONS-SCNC: 10.8 MMOL/L
ARTERIAL PATENCY WRIST A: POSITIVE
ATMOSPHERIC PRESS: 729 MMHG
BACTERIA FLD CULT: NORMAL
BASE EXCESS BLDA CALC-SCNC: 5.7 MMOL/L
BASOPHILS # BLD AUTO: 0.02 10*3/MM3 (ref 0–0.2)
BASOPHILS NFR BLD AUTO: 0.2 % (ref 0–2.5)
BDY SITE: ABNORMAL
BILIRUB UR QL STRIP: NEGATIVE
BUN BLD-MCNC: 26 MG/DL (ref 7–20)
BUN/CREAT SERPL: 32.5 (ref 6.3–21.9)
CALCIUM SPEC-SCNC: 8.9 MG/DL (ref 8.4–10.2)
CHLORIDE SERPL-SCNC: 104 MMOL/L (ref 98–107)
CLARITY UR: CLEAR
CO2 SERPL-SCNC: 29 MMOL/L (ref 26–30)
COLOR UR: YELLOW
CREAT BLD-MCNC: 0.8 MG/DL (ref 0.6–1.3)
DEPRECATED RDW RBC AUTO: 43.5 FL (ref 37–54)
EOSINOPHIL # BLD AUTO: 0.01 10*3/MM3 (ref 0–0.7)
EOSINOPHIL NFR BLD AUTO: 0.1 % (ref 0–7)
ERYTHROCYTE [DISTWIDTH] IN BLOOD BY AUTOMATED COUNT: 14.3 % (ref 11.5–14.5)
GFR SERPL CREATININE-BSD FRML MDRD: 107 ML/MIN/1.73
GLUCOSE BLD-MCNC: 138 MG/DL (ref 74–98)
GLUCOSE UR STRIP-MCNC: NEGATIVE MG/DL
HCO3 BLDA-SCNC: 29.9 MMOL/L (ref 22–28)
HCT VFR BLD AUTO: 45 % (ref 42–52)
HGB BLD-MCNC: 14.9 G/DL (ref 14–18)
HGB BLDA-MCNC: 15.1 G/DL (ref 12–18)
HGB UR QL STRIP.AUTO: NEGATIVE
HOROWITZ INDEX BLD+IHG-RTO: 100 %
IMM GRANULOCYTES # BLD: 0.15 10*3/MM3 (ref 0–0.06)
IMM GRANULOCYTES NFR BLD: 1.1 % (ref 0–0.6)
KETONES UR QL STRIP: NEGATIVE
L PNEUMO1 AG UR QL IA: NEGATIVE
LEUKOCYTE ESTERASE UR QL STRIP.AUTO: NEGATIVE
LYMPHOCYTES # BLD AUTO: 0.94 10*3/MM3 (ref 0.6–3.4)
LYMPHOCYTES NFR BLD AUTO: 7.1 % (ref 10–50)
Lab: NORMAL
MAGNESIUM SERPL-MCNC: 2.2 MG/DL (ref 1.6–2.3)
MCH RBC QN AUTO: 27.9 PG (ref 27–31)
MCHC RBC AUTO-ENTMCNC: 33.1 G/DL (ref 30–37)
MCV RBC AUTO: 84.3 FL (ref 80–94)
MODALITY: ABNORMAL
MONOCYTES # BLD AUTO: 0.44 10*3/MM3 (ref 0–0.9)
MONOCYTES NFR BLD AUTO: 3.3 % (ref 0–12)
MRSA SPEC QL CULT: NORMAL
NEUTROPHILS # BLD AUTO: 11.72 10*3/MM3 (ref 2–6.9)
NEUTROPHILS NFR BLD AUTO: 88.2 % (ref 37–80)
NITRITE UR QL STRIP: NEGATIVE
NRBC BLD MANUAL-RTO: 0 /100 WBC (ref 0–0)
ORGANISM ID: NORMAL
PCO2 BLDA: 44.3 MM HG (ref 35–45)
PH BLDA: 7.44 PH UNITS
PH UR STRIP.AUTO: 7 [PH] (ref 5–8)
PHOSPHATE SERPL-MCNC: 3.6 MG/DL (ref 2.5–4.5)
PLATELET # BLD AUTO: 170 10*3/MM3 (ref 130–400)
PMV BLD AUTO: 10.4 FL (ref 6–12)
PO2 BLDA: 94.2 MM HG (ref 75–100)
POTASSIUM BLD-SCNC: 3.8 MMOL/L (ref 3.5–5.1)
PROT UR QL STRIP: NEGATIVE
RBC # BLD AUTO: 5.34 10*6/MM3 (ref 4.7–6.1)
S PNEUM AG SPEC QL LA: NEGATIVE
SAO2 % BLDCOA: 96.4 %
SODIUM BLD-SCNC: 140 MMOL/L (ref 137–145)
SP GR UR STRIP: 1.02 (ref 1–1.03)
SPECIMEN SOURCE: NORMAL
UROBILINOGEN UR QL STRIP: NORMAL
WBC NRBC COR # BLD: 13.28 10*3/MM3 (ref 4.8–10.8)

## 2017-09-13 PROCEDURE — 71010 HC CHEST PA OR AP: CPT

## 2017-09-13 PROCEDURE — 85025 COMPLETE CBC W/AUTO DIFF WBC: CPT | Performed by: INTERNAL MEDICINE

## 2017-09-13 PROCEDURE — 83735 ASSAY OF MAGNESIUM: CPT | Performed by: INTERNAL MEDICINE

## 2017-09-13 PROCEDURE — 94799 UNLISTED PULMONARY SVC/PX: CPT

## 2017-09-13 PROCEDURE — 25010000002 ENOXAPARIN PER 10 MG: Performed by: INTERNAL MEDICINE

## 2017-09-13 PROCEDURE — 94660 CPAP INITIATION&MGMT: CPT

## 2017-09-13 PROCEDURE — 36600 WITHDRAWAL OF ARTERIAL BLOOD: CPT

## 2017-09-13 PROCEDURE — 81003 URINALYSIS AUTO W/O SCOPE: CPT | Performed by: INTERNAL MEDICINE

## 2017-09-13 PROCEDURE — 99233 SBSQ HOSP IP/OBS HIGH 50: CPT | Performed by: INTERNAL MEDICINE

## 2017-09-13 PROCEDURE — 25010000002 METHYLPREDNISOLONE PER 40 MG: Performed by: INTERNAL MEDICINE

## 2017-09-13 PROCEDURE — 80069 RENAL FUNCTION PANEL: CPT | Performed by: INTERNAL MEDICINE

## 2017-09-13 PROCEDURE — 25010000002 CEFTRIAXONE: Performed by: INTERNAL MEDICINE

## 2017-09-13 PROCEDURE — 82805 BLOOD GASES W/O2 SATURATION: CPT

## 2017-09-13 PROCEDURE — 25010000002 AZITHROMYCIN: Performed by: INTERNAL MEDICINE

## 2017-09-13 PROCEDURE — 93306 TTE W/DOPPLER COMPLETE: CPT

## 2017-09-13 RX ADMIN — METHYLPREDNISOLONE SODIUM SUCCINATE 40 MG: 40 INJECTION, POWDER, FOR SOLUTION INTRAMUSCULAR; INTRAVENOUS at 06:24

## 2017-09-13 RX ADMIN — METHYLPREDNISOLONE SODIUM SUCCINATE 40 MG: 40 INJECTION, POWDER, FOR SOLUTION INTRAMUSCULAR; INTRAVENOUS at 00:23

## 2017-09-13 RX ADMIN — METHYLPREDNISOLONE SODIUM SUCCINATE 40 MG: 40 INJECTION, POWDER, FOR SOLUTION INTRAMUSCULAR; INTRAVENOUS at 23:27

## 2017-09-13 RX ADMIN — CYCLOBENZAPRINE HYDROCHLORIDE 5 MG: 10 TABLET, FILM COATED ORAL at 17:12

## 2017-09-13 RX ADMIN — CARVEDILOL 12.5 MG: 12.5 TABLET, FILM COATED ORAL at 08:52

## 2017-09-13 RX ADMIN — CLONIDINE HYDROCHLORIDE 0.2 MG: 0.2 TABLET ORAL at 20:58

## 2017-09-13 RX ADMIN — CLONIDINE HYDROCHLORIDE 0.2 MG: 0.2 TABLET ORAL at 08:52

## 2017-09-13 RX ADMIN — BUDESONIDE 0.5 MG: 0.5 INHALANT RESPIRATORY (INHALATION) at 07:00

## 2017-09-13 RX ADMIN — AMLODIPINE BESYLATE 10 MG: 5 TABLET ORAL at 08:52

## 2017-09-13 RX ADMIN — IPRATROPIUM BROMIDE AND ALBUTEROL SULFATE 3 ML: .5; 3 SOLUTION RESPIRATORY (INHALATION) at 19:04

## 2017-09-13 RX ADMIN — ENOXAPARIN SODIUM 40 MG: 40 INJECTION SUBCUTANEOUS at 08:52

## 2017-09-13 RX ADMIN — HYDROCHLOROTHIAZIDE 25 MG: 25 TABLET ORAL at 08:52

## 2017-09-13 RX ADMIN — METHYLPREDNISOLONE SODIUM SUCCINATE 40 MG: 40 INJECTION, POWDER, FOR SOLUTION INTRAMUSCULAR; INTRAVENOUS at 11:32

## 2017-09-13 RX ADMIN — IPRATROPIUM BROMIDE AND ALBUTEROL SULFATE 3 ML: .5; 3 SOLUTION RESPIRATORY (INHALATION) at 03:46

## 2017-09-13 RX ADMIN — HYDROCODONE POLISTIREX AND CHLORPHENIRAMINE POLISTIREX 5 ML: 10; 8 SUSPENSION, EXTENDED RELEASE ORAL at 16:09

## 2017-09-13 RX ADMIN — IPRATROPIUM BROMIDE AND ALBUTEROL SULFATE 3 ML: .5; 3 SOLUTION RESPIRATORY (INHALATION) at 00:35

## 2017-09-13 RX ADMIN — METHYLPREDNISOLONE SODIUM SUCCINATE 40 MG: 40 INJECTION, POWDER, FOR SOLUTION INTRAMUSCULAR; INTRAVENOUS at 17:49

## 2017-09-13 RX ADMIN — CARVEDILOL 12.5 MG: 12.5 TABLET, FILM COATED ORAL at 17:12

## 2017-09-13 RX ADMIN — CEFTRIAXONE 1 G: 1 INJECTION, POWDER, FOR SOLUTION INTRAMUSCULAR; INTRAVENOUS at 17:12

## 2017-09-13 RX ADMIN — IPRATROPIUM BROMIDE AND ALBUTEROL SULFATE 3 ML: .5; 3 SOLUTION RESPIRATORY (INHALATION) at 23:04

## 2017-09-13 RX ADMIN — LISINOPRIL 40 MG: 20 TABLET ORAL at 08:52

## 2017-09-13 RX ADMIN — IPRATROPIUM BROMIDE AND ALBUTEROL SULFATE 3 ML: .5; 3 SOLUTION RESPIRATORY (INHALATION) at 15:37

## 2017-09-13 RX ADMIN — IPRATROPIUM BROMIDE AND ALBUTEROL SULFATE 3 ML: .5; 3 SOLUTION RESPIRATORY (INHALATION) at 07:00

## 2017-09-13 RX ADMIN — BUDESONIDE 0.5 MG: 0.5 INHALANT RESPIRATORY (INHALATION) at 19:04

## 2017-09-13 RX ADMIN — IPRATROPIUM BROMIDE AND ALBUTEROL SULFATE 3 ML: .5; 3 SOLUTION RESPIRATORY (INHALATION) at 11:16

## 2017-09-13 RX ADMIN — AZITHROMYCIN 500 MG: 500 INJECTION, POWDER, LYOPHILIZED, FOR SOLUTION INTRAVENOUS at 17:49

## 2017-09-13 NOTE — PROGRESS NOTES
"  CC: Acute Respiratory Failure. PNA.     S: Continues to be Short of breath. Continues to have dry cough. Is C/O diffuse lower chest wall pain, when he coughs.    O:Vital signs reviewed. O2Sat: 95% on 100% (30LPM).   /95  Pulse 56  Temp 98.7 °F (37.1 °C) (Oral)   Resp 20  Ht 66\" (167.6 cm)  Wt 203 lb 14.4 oz (92.5 kg)  SpO2 100%  BMI 32.91 kg/m2      I & Os reviewed.   Intake/Output       09/12/17 0700 - 09/13/17 0659    Intake (ml) 1120    Output (ml) 1325    Net (ml) -205    Last Weight 203 lb 14.4 oz (92.5 kg)          General: Mild acute distress noted.  Eyes: PERRL. EOM intact.  Neck: Supple without JVD  Cardiovascular: S1 + S2. Regular.   Respiratory: Mild to moderate respiratory distress noted. No wheezing heard. B/L basal crackles noted  Abdomen: Soft. Bowel sounds positive   Extremities: No edema noted.  Neurologic: AAOx3. Was able to follow commands.    Skin: Appeared somewhat dry     Labs: Reviewed.       Results from last 7 days  Lab Units 09/13/17  0409 09/12/17  0607 09/11/17  0414 09/10/17  0606 09/09/17  1356   SODIUM mmol/L 140 138 136* 141 140   POTASSIUM mmol/L 3.8 4.2 5.1 3.9 3.6   CHLORIDE mmol/L 104 106 109* 112* 107   CO2 mmol/L 29.0 26.0 17.0* 20.0* 19.0*   BUN mg/dL 26* 25* 23* 18 15   CREATININE mg/dL 0.80 0.80 0.90 0.70 0.90   CALCIUM mg/dL 8.9 9.3 8.9 8.8 9.3   BILIRUBIN mg/dL  --   --  0.6 0.3 0.8   ALK PHOS U/L  --   --  59 52 68   ALT (SGPT) U/L  --   --  31 35 35   AST (SGOT) U/L  --   --  64* 31 30   GLUCOSE mg/dL 138* 125* 126* 138* 122*         Results from last 7 days  Lab Units 09/13/17  0409 09/12/17  0607 09/11/17  0414 09/10/17  0606 09/09/17  1356   WBC 10*3/mm3 13.28* 17.77* 19.99* 18.29* 16.72*   HEMOGLOBIN g/dL 14.9 15.2 14.9 15.6 17.4   PLATELETS 10*3/mm3 170 179 173 174 188       Lab Results   Component Value Date    PHART 7.438 09/13/2017    [id number] 44.3 09/13/2017    PO2ART 94.2 09/13/2017    HGBBG 15.1 09/13/2017    [id number] 96.4 09/13/2017       CXRay: " Reviewed personally.     Assessment & Recommendations/Plan:   1. Acute Hypoxic Respiratory Failure.    2. Likely PNA.  -  Strep and Legionella antigen pending.    3. Abnl CT.    4. Smoking.     The patient continues to have significant hypoxia although has remained stable and not needed BiPAP for more than 18-24 hours.    I would recommend continuation of current antibiotic regimen.  Culture data is pending.    I would also recommend that he continues to be monitored in the intensive care unit, since his condition remains critical but stable, especially due to significant respiratory failure.    Will order BNP levels to rule out possibility of pulmonary edema playing a role.    We have reviewed patient's current orders and changes, if any, have been suggested to primary care team. Plan was also discussed with nursing staff, as necessary.     Elvis Oconnor MD  09/13/17  8:24 AM    Dictated utilizing Dragon dictation.

## 2017-09-13 NOTE — PLAN OF CARE
Problem: Patient Care Overview (Adult)  Goal: Plan of Care Review  Outcome: Ongoing (interventions implemented as appropriate)    09/13/17 1403   Coping/Psychosocial Response Interventions   Plan Of Care Reviewed With patient   Patient Care Overview   Progress no change         Problem: NPPV/CPAP (Adult)  Goal: Signs and Symptoms of Listed Potential Problems Will be Absent or Manageable (NPPV/CPAP)  Outcome: Ongoing (interventions implemented as appropriate)    09/13/17 1403   NPPV/CPAP   Problems Assessed (NPPV/CPAP) hypoxia/hypoxemia   Problems Present (NPPV/CPAP) none

## 2017-09-13 NOTE — PLAN OF CARE
Problem: Pneumonia (Adult)  Goal: Signs and Symptoms of Listed Potential Problems Will be Absent or Manageable (Pneumonia)  Outcome: Ongoing (interventions implemented as appropriate)    Problem: Patient Care Overview (Adult)  Goal: Plan of Care Review  Outcome: Ongoing (interventions implemented as appropriate)    09/13/17 0616   Coping/Psychosocial Response Interventions   Plan Of Care Reviewed With patient   Patient Care Overview   Progress improving   Outcome Evaluation   Outcome Summary/Follow up Plan lungs sound better, off bipap to high flow all night

## 2017-09-13 NOTE — PROGRESS NOTES
"Adult Nutrition  Assessment/PES    Patient Name:  Tesfaye Ang  YOB: 1975  MRN: [id number]  Admit Date:  9/9/2017    Assessment Date:  9/13/2017        Reason for Assessment       09/13/17 1111    Reason for Assessment    Reason For Assessment/Visit admission assessment    Identified At Risk By Screening Criteria diagnosis;BMI    Diagnosis Diagnosis    Cardiac MI;HTN;CHF    Pulmonary/Critical Care Pneumonia;Acute respiratory failure;Other (comment);COPD   hypoxia    Factors Affecting Nutrition Factors    Appetite Good                Anthropometrics       09/13/17 1114    Anthropometrics    Height Method Stated    Height 167.6 cm (66\")    Weight Method Bed scale    Weight 92.1 kg (203 lb)    Ideal Body Weight (IBW)    Ideal Body Weight (IBW), Male (kg) 65.3    % Ideal Body Weight 141.3    Body Mass Index (BMI)    BMI (kg/m2) 32.83    BMI Grade 30 - 34.9- obesity - grade I            Labs/Tests/Procedures/Meds       09/13/17 1115    Labs/Tests/Procedures/Meds    Labs/Tests Review Reviewed;Glucose;BUN    Medication Review Reviewed, pertinent;Antibiotic;Steroid            Physical Findings       09/13/17 1116    Physical Findings/Assessment    Additional Documentation Physical Appearance (Group)    Physical Appearance    Overall Physical Appearance obese            Estimated/Assessed Needs       09/13/17 1116    Calculation Measurements    Weight Used For Calculations 71.5 kg (157 lb 9 oz)    Height Used for Calculations 1.676 m (5' 6\")    Estimated/Assessed Energy Needs    Energy Need Method [place]-St [place]or    Age 41    RMR ([place]-St[place] Jeor Equation) 1562.45    Activity Factors ([place] St Jeor)  Out of bed, ambulatory  1.3    Estimated Kcal Range  ~5883-5095    Estimated/Assessed Protein Needs    Weight Used for Protein Calculation 71.5 kg (157 lb 9 oz)    Protein (gm/kg) 1.5    1.5 Gm Protein (gm) 107.2    Estimated Protein Range ~85..2 gm    Estimated/Assessed Fluid Needs    Fluid Need " Method RDA method    RDA Method (mL)  2300            Nutrition Prescription Ordered       09/13/17 1125    Nutrition Prescription PO    Current PO Diet Soft Texture    Texture Whole foods    Supplement Ensure Complete    Supplement Frequency 2 times a day            Evaluation of Received Nutrient/Fluid Intake       09/13/17 1125    PO Evaluation    Number of Days PO Intake Evaluated 3 days    Number of Meals 8    % PO Intake 93.75              Problem/Interventions:        Problem 1       09/13/17 1126    Nutrition Diagnoses Problem 1    Problem 1 Overweight/Obesity    Etiology (related to) Factors Affecting Nutrition    Food Habit/Preferences Large Meals    Signs/Symptoms (evidenced by) BMI    BMI 30 - 34.9            Problem 2       09/13/17 1129    Nutrition Diagnoses Problem 2    Problem 2 Increased Nutrient Needs    Macronutrient Kcal;Fluid;Protein    Micronutrient Vitamin;Mineral    Etiology (related to) Medical Diagnosis    Pulmonary/Critical Care Pneumonia    Signs/Symptoms (evidenced by) Other (comment)   pulmonary dysfunction                  Intervention Goal       09/13/17 1131    Intervention Goal    General Meet nutritional needs for age/condition    PO PO intake (%)    PO Intake % 75 %            Nutrition Intervention       09/13/17 1132    Nutrition Intervention    RD/Tech Action Recommend/ordered;Supplement provided;Follow Tx progress;Interview for preference;Advise available snack    Recommended/Ordered Supplement            Nutrition Prescription       09/13/17 1132    Nutrition Prescription PO    PO Prescription Begin/change supplement   Continue with current diet order    Supplement Ensure Complete    Supplement Frequency 2 times a day    New PO Prescription Ordered? Yes    Other Orders    Obtain Weight Daily    Obtain Weight Ordered? No, recommended    Supplement Vitamin mineral supplement    Supplement Ordered? No, recommended            Education/Evaluation       09/13/17 1132    Education     Education Provided education regarding;Education topics    Education Topics CHF;Other (comment);Weight management - maintain   COPD    Monitor/Evaluation    Monitor Per protocol;PO intake;Supplement intake;Pertinent labs;Weight        Comments:  Rec. #1: Continue with current diet order. Pt. Consuming ~93% of meals on average. RD added Ensure Complete BID. Rec. #2: Consider adding MVI. Consult RD PRN.     Electronically signed by:  Linda Gil RD  09/13/17 11:33 AM

## 2017-09-14 LAB
ALBUMIN SERPL-MCNC: 3.3 G/DL (ref 3.5–5)
ANION GAP SERPL CALCULATED.3IONS-SCNC: 13 MMOL/L
BACTERIA SPEC AEROBE CULT: NORMAL
BACTERIA SPEC AEROBE CULT: NORMAL
BASOPHILS # BLD AUTO: 0.04 10*3/MM3 (ref 0–0.2)
BASOPHILS NFR BLD AUTO: 0.3 % (ref 0–2.5)
BH CV ECHO MEAS - % IVS THICK: 25 %
BH CV ECHO MEAS - % LVPW THICK: 27.3 %
BH CV ECHO MEAS - ACS: 2.1 CM
BH CV ECHO MEAS - AO MAX PG (FULL): 2.9 MMHG
BH CV ECHO MEAS - AO MAX PG: 8.8 MMHG
BH CV ECHO MEAS - AO MEAN PG (FULL): 2.2 MMHG
BH CV ECHO MEAS - AO MEAN PG: 5.2 MMHG
BH CV ECHO MEAS - AO ROOT AREA (BSA CORRECTED): 1.7
BH CV ECHO MEAS - AO ROOT AREA: 9.2 CM^2
BH CV ECHO MEAS - AO ROOT DIAM: 3.4 CM
BH CV ECHO MEAS - AO V2 MAX: 148.6 CM/SEC
BH CV ECHO MEAS - AO V2 MEAN: 104.1 CM/SEC
BH CV ECHO MEAS - AO V2 VTI: 29.7 CM
BH CV ECHO MEAS - BSA(HAYCOCK): 2.1 M^2
BH CV ECHO MEAS - BSA: 2 M^2
BH CV ECHO MEAS - BZI_BMI: 32.8 KILOGRAMS/M^2
BH CV ECHO MEAS - BZI_METRIC_HEIGHT: 167.6 CM
BH CV ECHO MEAS - BZI_METRIC_WEIGHT: 92.1 KG
BH CV ECHO MEAS - CONTRAST EF 4CH: 73 ML/M^2
BH CV ECHO MEAS - EDV(CUBED): 120.6 ML
BH CV ECHO MEAS - EDV(MOD-SP4): 89 ML
BH CV ECHO MEAS - EDV(TEICH): 115 ML
BH CV ECHO MEAS - EF(CUBED): 72.1 %
BH CV ECHO MEAS - EF(MOD-SP4): 73 %
BH CV ECHO MEAS - EF(TEICH): 63.7 %
BH CV ECHO MEAS - ESV(CUBED): 33.6 ML
BH CV ECHO MEAS - ESV(MOD-SP4): 24 ML
BH CV ECHO MEAS - ESV(TEICH): 41.8 ML
BH CV ECHO MEAS - FS: 34.7 %
BH CV ECHO MEAS - IVS/LVPW: 1.1
BH CV ECHO MEAS - IVSD: 1.2 CM
BH CV ECHO MEAS - IVSS: 1.5 CM
BH CV ECHO MEAS - LA DIMENSION: 4.2 CM
BH CV ECHO MEAS - LA/AO: 1.2
BH CV ECHO MEAS - LV DIASTOLIC VOL/BSA (35-75): 44.2 ML/M^2
BH CV ECHO MEAS - LV MASS(C)D: 218.6 GRAMS
BH CV ECHO MEAS - LV MASS(C)DI: 108.6 GRAMS/M^2
BH CV ECHO MEAS - LV MASS(C)S: 166.2 GRAMS
BH CV ECHO MEAS - LV MASS(C)SI: 82.6 GRAMS/M^2
BH CV ECHO MEAS - LV MAX PG: 5.9 MMHG
BH CV ECHO MEAS - LV MEAN PG: 3 MMHG
BH CV ECHO MEAS - LV SYSTOLIC VOL/BSA (12-30): 11.9 ML/M^2
BH CV ECHO MEAS - LV V1 MAX: 121.9 CM/SEC
BH CV ECHO MEAS - LV V1 MEAN: 77 CM/SEC
BH CV ECHO MEAS - LV V1 VTI: 27.7 CM
BH CV ECHO MEAS - LVIDD: 4.9 CM
BH CV ECHO MEAS - LVIDS: 3.2 CM
BH CV ECHO MEAS - LVLD AP4: 8.9 CM
BH CV ECHO MEAS - LVLS AP4: 7.3 CM
BH CV ECHO MEAS - LVPWD: 1.1 CM
BH CV ECHO MEAS - LVPWS: 1.4 CM
BH CV ECHO MEAS - MV A MAX VEL: 100.2 CM/SEC
BH CV ECHO MEAS - MV DEC SLOPE: 514 CM/SEC^2
BH CV ECHO MEAS - MV DEC TIME: 0.15 SEC
BH CV ECHO MEAS - MV E MAX VEL: 119.9 CM/SEC
BH CV ECHO MEAS - MV E/A: 1.2
BH CV ECHO MEAS - MV MAX PG: 7.5 MMHG
BH CV ECHO MEAS - MV MEAN PG: 1.9 MMHG
BH CV ECHO MEAS - MV P1/2T MAX VEL: 130.3 CM/SEC
BH CV ECHO MEAS - MV P1/2T: 74.3 MSEC
BH CV ECHO MEAS - MV V2 MAX: 137.1 CM/SEC
BH CV ECHO MEAS - MV V2 MEAN: 62 CM/SEC
BH CV ECHO MEAS - MV V2 VTI: 42.2 CM
BH CV ECHO MEAS - MVA P1/2T LCG: 1.7 CM^2
BH CV ECHO MEAS - MVA(P1/2T): 3 CM^2
BH CV ECHO MEAS - PA ACC SLOPE: 717.9 CM/SEC^2
BH CV ECHO MEAS - PA ACC TIME: 0.12 SEC
BH CV ECHO MEAS - PA MAX PG (FULL): 1.4 MMHG
BH CV ECHO MEAS - PA MAX PG: 4.5 MMHG
BH CV ECHO MEAS - PA MEAN PG (FULL): 0.66 MMHG
BH CV ECHO MEAS - PA MEAN PG: 2.2 MMHG
BH CV ECHO MEAS - PA PR(ACCEL): 26.7 MMHG
BH CV ECHO MEAS - PA V2 MAX: 105.9 CM/SEC
BH CV ECHO MEAS - PA V2 MEAN: 66.5 CM/SEC
BH CV ECHO MEAS - PA V2 VTI: 21.8 CM
BH CV ECHO MEAS - RV MAX PG: 3.1 MMHG
BH CV ECHO MEAS - RV MEAN PG: 1.6 MMHG
BH CV ECHO MEAS - RV V1 MAX: 87.9 CM/SEC
BH CV ECHO MEAS - RV V1 MEAN: 57 CM/SEC
BH CV ECHO MEAS - RV V1 VTI: 21.9 CM
BH CV ECHO MEAS - SI(AO): 136.2 ML/M^2
BH CV ECHO MEAS - SI(CUBED): 43.2 ML/M^2
BH CV ECHO MEAS - SI(MOD-SP4): 32.3 ML/M^2
BH CV ECHO MEAS - SI(TEICH): 36.4 ML/M^2
BH CV ECHO MEAS - SV(AO): 274.1 ML
BH CV ECHO MEAS - SV(CUBED): 87 ML
BH CV ECHO MEAS - SV(MOD-SP4): 65 ML
BH CV ECHO MEAS - SV(TEICH): 73.2 ML
BH CV ECHO MEAS - TR MAX VEL: 305.4 CM/SEC
BUN BLD-MCNC: 29 MG/DL (ref 7–20)
BUN/CREAT SERPL: 41.4 (ref 6.3–21.9)
CALCIUM SPEC-SCNC: 9.1 MG/DL (ref 8.4–10.2)
CHLORIDE SERPL-SCNC: 102 MMOL/L (ref 98–107)
CO2 SERPL-SCNC: 29 MMOL/L (ref 26–30)
CREAT BLD-MCNC: 0.7 MG/DL (ref 0.6–1.3)
DEPRECATED RDW RBC AUTO: 42.1 FL (ref 37–54)
EOSINOPHIL # BLD AUTO: 0 10*3/MM3 (ref 0–0.7)
EOSINOPHIL NFR BLD AUTO: 0 % (ref 0–7)
ERYTHROCYTE [DISTWIDTH] IN BLOOD BY AUTOMATED COUNT: 13.8 % (ref 11.5–14.5)
GFR SERPL CREATININE-BSD FRML MDRD: 124 ML/MIN/1.73
GLUCOSE BLD-MCNC: 128 MG/DL (ref 74–98)
HCT VFR BLD AUTO: 46.1 % (ref 42–52)
HGB BLD-MCNC: 15.5 G/DL (ref 14–18)
IMM GRANULOCYTES # BLD: 0.28 10*3/MM3 (ref 0–0.06)
IMM GRANULOCYTES NFR BLD: 2.2 % (ref 0–0.6)
LYMPHOCYTES # BLD AUTO: 0.93 10*3/MM3 (ref 0.6–3.4)
LYMPHOCYTES NFR BLD AUTO: 7.4 % (ref 10–50)
MAGNESIUM SERPL-MCNC: 2.2 MG/DL (ref 1.6–2.3)
MCH RBC QN AUTO: 28 PG (ref 27–31)
MCHC RBC AUTO-ENTMCNC: 33.6 G/DL (ref 30–37)
MCV RBC AUTO: 83.4 FL (ref 80–94)
MONOCYTES # BLD AUTO: 0.34 10*3/MM3 (ref 0–0.9)
MONOCYTES NFR BLD AUTO: 2.7 % (ref 0–12)
NEUTROPHILS # BLD AUTO: 11.03 10*3/MM3 (ref 2–6.9)
NEUTROPHILS NFR BLD AUTO: 87.4 % (ref 37–80)
NRBC BLD MANUAL-RTO: 0 /100 WBC (ref 0–0)
PHOSPHATE SERPL-MCNC: 4.3 MG/DL (ref 2.5–4.5)
PLATELET # BLD AUTO: 177 10*3/MM3 (ref 130–400)
PMV BLD AUTO: 10.3 FL (ref 6–12)
POTASSIUM BLD-SCNC: 4 MMOL/L (ref 3.5–5.1)
RBC # BLD AUTO: 5.53 10*6/MM3 (ref 4.7–6.1)
SODIUM BLD-SCNC: 140 MMOL/L (ref 137–145)
WBC NRBC COR # BLD: 12.62 10*3/MM3 (ref 4.8–10.8)

## 2017-09-14 PROCEDURE — 80069 RENAL FUNCTION PANEL: CPT | Performed by: INTERNAL MEDICINE

## 2017-09-14 PROCEDURE — 85025 COMPLETE CBC W/AUTO DIFF WBC: CPT | Performed by: INTERNAL MEDICINE

## 2017-09-14 PROCEDURE — 94660 CPAP INITIATION&MGMT: CPT

## 2017-09-14 PROCEDURE — 99232 SBSQ HOSP IP/OBS MODERATE 35: CPT | Performed by: INTERNAL MEDICINE

## 2017-09-14 PROCEDURE — 94799 UNLISTED PULMONARY SVC/PX: CPT

## 2017-09-14 PROCEDURE — 25010000002 CEFTRIAXONE: Performed by: INTERNAL MEDICINE

## 2017-09-14 PROCEDURE — 83735 ASSAY OF MAGNESIUM: CPT | Performed by: INTERNAL MEDICINE

## 2017-09-14 PROCEDURE — 25010000002 ENOXAPARIN PER 10 MG: Performed by: INTERNAL MEDICINE

## 2017-09-14 PROCEDURE — 25010000002 METHYLPREDNISOLONE PER 40 MG: Performed by: INTERNAL MEDICINE

## 2017-09-14 RX ORDER — FUROSEMIDE 10 MG/ML
40 INJECTION INTRAMUSCULAR; INTRAVENOUS ONCE
Status: DISCONTINUED | OUTPATIENT
Start: 2017-09-14 | End: 2017-09-14

## 2017-09-14 RX ORDER — LIDOCAINE HYDROCHLORIDE 40 MG/ML
5 INJECTION, SOLUTION RETROBULBAR; TOPICAL EVERY 6 HOURS PRN
Status: DISPENSED | OUTPATIENT
Start: 2017-09-14 | End: 2017-09-16

## 2017-09-14 RX ORDER — LIDOCAINE HYDROCHLORIDE 20 MG/ML
2 INJECTION, SOLUTION INFILTRATION; PERINEURAL EVERY 6 HOURS PRN
Status: DISCONTINUED | OUTPATIENT
Start: 2017-09-14 | End: 2017-09-14

## 2017-09-14 RX ORDER — HYDROCODONE BITARTRATE AND ACETAMINOPHEN 7.5; 325 MG/1; MG/1
1 TABLET ORAL EVERY 4 HOURS PRN
Status: DISCONTINUED | OUTPATIENT
Start: 2017-09-14 | End: 2017-09-20 | Stop reason: HOSPADM

## 2017-09-14 RX ADMIN — BENZONATATE 200 MG: 100 CAPSULE, LIQUID FILLED ORAL at 21:50

## 2017-09-14 RX ADMIN — AMLODIPINE BESYLATE 10 MG: 5 TABLET ORAL at 08:29

## 2017-09-14 RX ADMIN — HYDROCODONE BITARTRATE AND ACETAMINOPHEN 1 TABLET: 7.5; 325 TABLET ORAL at 21:54

## 2017-09-14 RX ADMIN — HYDROCHLOROTHIAZIDE 25 MG: 25 TABLET ORAL at 08:28

## 2017-09-14 RX ADMIN — CEFTRIAXONE 1 G: 1 INJECTION, POWDER, FOR SOLUTION INTRAMUSCULAR; INTRAVENOUS at 17:54

## 2017-09-14 RX ADMIN — LIDOCAINE HYDROCHLORIDE 5 ML: 40 INJECTION, SOLUTION RETROBULBAR; TOPICAL at 22:08

## 2017-09-14 RX ADMIN — HYDROCODONE POLISTIREX AND CHLORPHENIRAMINE POLISTIREX 5 ML: 10; 8 SUSPENSION, EXTENDED RELEASE ORAL at 15:09

## 2017-09-14 RX ADMIN — HYDROCODONE BITARTRATE AND ACETAMINOPHEN 1 TABLET: 7.5; 325 TABLET ORAL at 15:06

## 2017-09-14 RX ADMIN — IPRATROPIUM BROMIDE AND ALBUTEROL SULFATE 3 ML: .5; 3 SOLUTION RESPIRATORY (INHALATION) at 16:00

## 2017-09-14 RX ADMIN — METHYLPREDNISOLONE SODIUM SUCCINATE 40 MG: 40 INJECTION, POWDER, FOR SOLUTION INTRAMUSCULAR; INTRAVENOUS at 06:29

## 2017-09-14 RX ADMIN — METHYLPREDNISOLONE SODIUM SUCCINATE 40 MG: 40 INJECTION, POWDER, FOR SOLUTION INTRAMUSCULAR; INTRAVENOUS at 23:07

## 2017-09-14 RX ADMIN — CLONIDINE HYDROCHLORIDE 0.2 MG: 0.2 TABLET ORAL at 08:29

## 2017-09-14 RX ADMIN — CARVEDILOL 12.5 MG: 12.5 TABLET, FILM COATED ORAL at 17:55

## 2017-09-14 RX ADMIN — CARVEDILOL 12.5 MG: 12.5 TABLET, FILM COATED ORAL at 08:29

## 2017-09-14 RX ADMIN — CLONIDINE HYDROCHLORIDE 0.2 MG: 0.2 TABLET ORAL at 21:50

## 2017-09-14 RX ADMIN — BUDESONIDE 0.5 MG: 0.5 INHALANT RESPIRATORY (INHALATION) at 07:04

## 2017-09-14 RX ADMIN — IPRATROPIUM BROMIDE AND ALBUTEROL SULFATE 3 ML: .5; 3 SOLUTION RESPIRATORY (INHALATION) at 23:57

## 2017-09-14 RX ADMIN — LIDOCAINE HYDROCHLORIDE 5 ML: 40 INJECTION, SOLUTION RETROBULBAR; TOPICAL at 10:06

## 2017-09-14 RX ADMIN — HYDROCODONE POLISTIREX AND CHLORPHENIRAMINE POLISTIREX 5 ML: 10; 8 SUSPENSION, EXTENDED RELEASE ORAL at 08:28

## 2017-09-14 RX ADMIN — HYDROCODONE POLISTIREX AND CHLORPHENIRAMINE POLISTIREX 5 ML: 10; 8 SUSPENSION, EXTENDED RELEASE ORAL at 15:04

## 2017-09-14 RX ADMIN — ENOXAPARIN SODIUM 40 MG: 40 INJECTION SUBCUTANEOUS at 08:28

## 2017-09-14 RX ADMIN — METHYLPREDNISOLONE SODIUM SUCCINATE 40 MG: 40 INJECTION, POWDER, FOR SOLUTION INTRAMUSCULAR; INTRAVENOUS at 17:55

## 2017-09-14 RX ADMIN — BENZONATATE 200 MG: 100 CAPSULE, LIQUID FILLED ORAL at 09:34

## 2017-09-14 RX ADMIN — IPRATROPIUM BROMIDE AND ALBUTEROL SULFATE 3 ML: .5; 3 SOLUTION RESPIRATORY (INHALATION) at 03:04

## 2017-09-14 RX ADMIN — HYDROCODONE POLISTIREX AND CHLORPHENIRAMINE POLISTIREX 5 ML: 10; 8 SUSPENSION, EXTENDED RELEASE ORAL at 06:29

## 2017-09-14 RX ADMIN — IPRATROPIUM BROMIDE AND ALBUTEROL SULFATE 3 ML: .5; 3 SOLUTION RESPIRATORY (INHALATION) at 19:26

## 2017-09-14 RX ADMIN — BUDESONIDE 0.5 MG: 0.5 INHALANT RESPIRATORY (INHALATION) at 19:26

## 2017-09-14 RX ADMIN — LISINOPRIL 40 MG: 20 TABLET ORAL at 08:28

## 2017-09-14 RX ADMIN — HYDROCODONE POLISTIREX AND CHLORPHENIRAMINE POLISTIREX 5 ML: 10; 8 SUSPENSION, EXTENDED RELEASE ORAL at 21:50

## 2017-09-14 RX ADMIN — IPRATROPIUM BROMIDE AND ALBUTEROL SULFATE 3 ML: .5; 3 SOLUTION RESPIRATORY (INHALATION) at 07:04

## 2017-09-14 RX ADMIN — HYDROCODONE BITARTRATE AND ACETAMINOPHEN 1 TABLET: 7.5; 325 TABLET ORAL at 09:31

## 2017-09-14 RX ADMIN — METHYLPREDNISOLONE SODIUM SUCCINATE 40 MG: 40 INJECTION, POWDER, FOR SOLUTION INTRAMUSCULAR; INTRAVENOUS at 11:18

## 2017-09-14 NOTE — PLAN OF CARE
Problem: NPPV/CPAP (Adult)  Goal: Signs and Symptoms of Listed Potential Problems Will be Absent or Manageable (NPPV/CPAP)  Outcome: Ongoing (interventions implemented as appropriate)    09/14/17 1652   NPPV/CPAP   Problems Assessed (NPPV/CPAP) situational response   Problems Present (NPPV/CPAP) (Wearing high flow cannula. Refusing Bipap)

## 2017-09-14 NOTE — PROGRESS NOTES
"  CC: Acute Respiratory Failure. PNA.     S: Continues to have dry cough. Is C/O diffuse lower chest wall pain, when he coughs.  He is also complaining of lower abdominal pain with cough.  Feels that the shortness of breath is definitely improved overall.    O:Vital signs reviewed. O2Sat: 95% on 90% (25LPM).   /100  Pulse 66  Temp 98.2 °F (36.8 °C) (Oral)   Resp 16  Ht 66\" (167.6 cm)  Wt 188 lb 1.6 oz (85.3 kg)  SpO2 99%  BMI 30.36 kg/m2      I & Os reviewed.   Intake/Output       09/13/17 0700 - 09/14/17 0659    Intake (ml) 1196    Output (ml) 2650    Net (ml) -1454    Last Weight 188 lb 1.6 oz (85.3 kg)          General: Mild acute distress noted.  Eyes: PERRL. EOM intact.  Cardiovascular: S1 + S2. Regular.   Respiratory: Mild to moderate respiratory distress noted. No wheezing heard. B/L basal crackles noted  Extremities: No edema noted.  Neurologic: AAOx3. Was able to follow commands.    Skin: Appeared somewhat dry     Labs: Reviewed.       Results from last 7 days  Lab Units 09/14/17  0408 09/13/17  0409 09/12/17  0607 09/11/17  0414 09/10/17  0606 09/09/17  1356   SODIUM mmol/L 140 140 138 136* 141 140   POTASSIUM mmol/L 4.0 3.8 4.2 5.1 3.9 3.6   CHLORIDE mmol/L 102 104 106 109* 112* 107   CO2 mmol/L 29.0 29.0 26.0 17.0* 20.0* 19.0*   BUN mg/dL 29* 26* 25* 23* 18 15   CREATININE mg/dL 0.70 0.80 0.80 0.90 0.70 0.90   CALCIUM mg/dL 9.1 8.9 9.3 8.9 8.8 9.3   BILIRUBIN mg/dL  --   --   --  0.6 0.3 0.8   ALK PHOS U/L  --   --   --  59 52 68   ALT (SGPT) U/L  --   --   --  31 35 35   AST (SGOT) U/L  --   --   --  64* 31 30   GLUCOSE mg/dL 128* 138* 125* 126* 138* 122*         Results from last 7 days  Lab Units 09/14/17  0408 09/13/17  0409 09/12/17  0607 09/11/17  0414 09/10/17  0606   WBC 10*3/mm3 12.62* 13.28* 17.77* 19.99* 18.29*   HEMOGLOBIN g/dL 15.5 14.9 15.2 14.9 15.6   PLATELETS 10*3/mm3 177 170 179 173 174       Lab Results   Component Value Date    PHART 7.438 09/13/2017    [id number] 44.3 " 09/13/2017    PO2ART 94.2 09/13/2017    HGBBG 15.1 09/13/2017    [id number] 96.4 09/13/2017       CXRay: Reviewed personally.     Assessment & Recommendations/Plan:   1. Acute Hypoxic Respiratory Failure.      2. Likely PNA.  -  Strep and Legionella antigen negative.    3. Abnl CT.    4. Smoking.     I will definitely changed the frequency of the Tussionex and make it every 6 hours when necessary.    Will also try Lidocaine Nebulized Q6 PRN. Will need to stay NPO for 45-60 minutes after the nebulized treatment.     Chest X Ray in AM.    We'll recommend continuation of current dose of steroids for another 24 hours.  Since his oxygenation has remained mostly stable, it may be okay to transfer the patient to the floor with telemetry monitoring    We have reviewed patient's current orders and changes, if any, have been suggested to primary care team. Plan was also discussed with nursing staff, as necessary.     Elvis Oconnor MD  09/14/17  7:45 AM    Dictated utilizing Dragon dictation.

## 2017-09-14 NOTE — PLAN OF CARE
Problem: Pneumonia (Adult)  Goal: Signs and Symptoms of Listed Potential Problems Will be Absent or Manageable (Pneumonia)  Outcome: Ongoing (interventions implemented as appropriate)    09/14/17 0332   Pneumonia   Problems Assessed (Pneumonia) all   Problems Present (Pneumonia) (O2 per NC hi jossy % 100 - 25 liters)       Goal: Signs and Symptoms of Listed Potential Problems Will be Absent or Manageable (Pneumonia)  Outcome: Ongoing (interventions implemented as appropriate)    09/14/17 0332   Pneumonia   Problems Present (Pneumonia) (O2 per NC hi jossy % 100 - 25 liters)         Problem: Patient Care Overview (Adult)  Goal: Plan of Care Review  Outcome: Ongoing (interventions implemented as appropriate)    Problem: NPPV/CPAP (Adult)  Goal: Signs and Symptoms of Listed Potential Problems Will be Absent or Manageable (NPPV/CPAP)  Outcome: Ongoing (interventions implemented as appropriate)    09/14/17 0332   NPPV/CPAP   Problems Present (NPPV/CPAP) (wearing hi jossy O2 NC)

## 2017-09-14 NOTE — PROGRESS NOTES
[facility]    Name:  Tesfaye Ang   Age:  41 y.o.  Sex:  male  :  1975  MRN:  [id number]   Visit Number:  [id number]  Admission Date:  2017  Date Of Service:  17  Primary Care Physician:  Karlee Thomas MD     LOS: 4 days :  Patient Care Team:  Karlee Thomas MD as PCP - General (Family Medicine):    History taken from:     patient    Chief Complaint:      Dyspnea    Subjective     Interval History:     Patient seen today.  Patient is mildly improved.  His cough is better.  He is admitted with bilateral pneumonia, requiring high flow oxygen.  Dr. Oconnor is following.  Patient has some left inguinal pain from coughing.  He does have a history of hernia.  He denies any nausea, vomiting, or bloating.  Reviewed echocardiogram which showed ejection fraction greater than 55% with normal size and function of the RV.  Patient denies any chest pressure, nausea, vomiting, or any other pain besides the inguinal pain.  Patient seems to be improving, we will transfer out of the intensive care unit.    Review of Systems:     All systems were reviewed and negative except for:  Respiratory: positive for  shortness of air    Objective     Vital Signs:    Temp:  [97.7 °F (36.5 °C)-98.4 °F (36.9 °C)] 97.8 °F (36.6 °C)  Heart Rate:  [53-74] 55  Resp:  [14-24] 24  BP: (132-166)/() 137/74    Physical Exam:      General Appearance:    Alert, cooperative, in no acute distress   Head:    Normocephalic, without obvious abnormality, atraumatic   Eyes:            Lids and lashes normal, conjunctivae and sclerae normal, no   icterus, no pallor, corneas clear, PERRLA   Ears:    Ears appear intact with no abnormalities noted   Throat:   No oral lesions, no thrush, oral mucosa moist   Neck:   No adenopathy, supple, trachea midline, no thyromegaly, no   carotid bruit, no JVD   Back:     No kyphosis present, no scoliosis present, no skin lesions,      erythema  or scars, no tenderness to percussion or                   palpation,   range of motion normal   Lungs:     Decreased breath sounds bilaterally with occasional wheeze.  No use of accessory muscles respiration.      Heart:    Regular rhythm and normal rate, normal S1 and S2, no            murmur, no gallop, no rub, no click   Chest Wall:    No abnormalities observed   Abdomen:     Normal bowel sounds, no masses, no organomegaly, soft        non-tender, non-distended, no guarding, no rebound                tenderness   Rectal:     Deferred   Extremities:   Moves all extremities well, no edema, no cyanosis, no             redness   Pulses:   Pulses palpable and equal bilaterally   Skin:   No bleeding, bruising or rash   Lymph nodes:   No palpable adenopathy   Neurologic:   Cranial nerves 2 - 12 grossly intact, sensation intact, DTR       present and equal bilaterally          Results Review:      I reviewed the patient's new clinical results.    Labs:  Lab Results (last 24 hours)     Procedure Component Value Units Date/Time    Legionella Antigen, Urine [[id number]] Collected:  09/11/17 1158    Specimen:  Urine from Urine, Clean Catch Updated:  09/13/17 1311     L. pneumophila Serogp 1 Ur Ag Negative      Presumptive negative for L. pneumophila serogroup 1 antigen in urine,  suggesting no recent or current infection. Legionnaires' disease  cannot be ruled out since other serogroups and species may also cause  disease.       Narrative:       Performed at:  [address] [place] [address]  [id number]  : Lanre Cedeno MD, Phone:  [phone]    Blood Culture [[id number]]  (Normal) Collected:  09/09/17 1432    Specimen:  Blood from Hand, Left Updated:  09/13/17 1601     Blood Culture No growth at 4 days    Blood Culture [[id number]]  (Normal) Collected:  09/09/17 1440    Specimen:  Blood from Hand, Right Updated:  09/13/17 1601     Blood Culture No growth at 4 days    Urinalysis With /  Microscopic If Indicated [[id number]]  (Normal) Collected:  09/13/17 1706    Specimen:  Urine from Urine, Clean Catch Updated:  09/13/17 1733     Color, UA Yellow     Appearance, UA Clear     pH, UA 7.0     Specific Gravity, UA 1.020     Glucose, UA Negative     Ketones, UA Negative     Bilirubin, UA Negative     Blood, UA Negative     Protein, UA Negative     Leuk Esterase, UA Negative     Nitrite, UA Negative     Urobilinogen, UA 1.0 E.U./dL    Narrative:       Urine microscopic not indicated.    CBC & Differential [[id number]] Collected:  09/14/17 0408    Specimen:  Blood Updated:  09/14/17 0512    Narrative:       The following orders were created for panel order CBC & Differential.  Procedure                               Abnormality         Status                     ---------                               -----------         ------                     CBC Auto Differential[[id number]]        Abnormal            Final result                 Please view results for these tests on the individual orders.    CBC Auto Differential [[id number]]  (Abnormal) Collected:  09/14/17 0408    Specimen:  Blood Updated:  09/14/17 0512     WBC 12.62 (H) 10*3/mm3      RBC 5.53 10*6/mm3      Hemoglobin 15.5 g/dL      Hematocrit 46.1 %      MCV 83.4 fL      MCH 28.0 pg      MCHC 33.6 g/dL      RDW 13.8 %      RDW-SD 42.1 fl      MPV 10.3 fL      Platelets 177 10*3/mm3      Neutrophil % 87.4 (H) %      Lymphocyte % 7.4 (L) %      Monocyte % 2.7 %      Eosinophil % 0.0 %      Basophil % 0.3 %      Immature Grans % 2.2 (H) %      Neutrophils, Absolute 11.03 (H) 10*3/mm3      Lymphocytes, Absolute 0.93 10*3/mm3      Monocytes, Absolute 0.34 10*3/mm3      Eosinophils, Absolute 0.00 10*3/mm3      Basophils, Absolute 0.04 10*3/mm3      Immature Grans, Absolute 0.28 (H) 10*3/mm3      nRBC 0.0 /100 WBC     Magnesium [[id number]]  (Normal) Collected:  09/14/17 0408    Specimen:  Blood Updated:  09/14/17 0547     Magnesium 2.2 mg/dL     Renal  Function Panel [[id number]]  (Abnormal) Collected:  09/14/17 0408    Specimen:  Blood Updated:  09/14/17 0547     Glucose 128 (H) mg/dL      BUN 29 (H) mg/dL      Creatinine 0.70 mg/dL      Sodium 140 mmol/L      Potassium 4.0 mmol/L      Chloride 102 mmol/L      CO2 29.0 mmol/L      Calcium 9.1 mg/dL      Albumin 3.30 (L) g/dL      Phosphorus 4.3 mg/dL      Anion Gap 13.0 mmol/L      BUN/Creatinine Ratio 41.4 (H)     eGFR Non African Amer 124 mL/min/1.73     Narrative:       Abnormal estimated GFR should be followed by more specific studies to confirm end stage chronic renal disease. The equation used for calculation may not be accurate for patients less than 19 years old, greater than 70 years old, patients at extremes of weight, malnutrition, or with acute renal dysfunction.           Radiology:    Imaging Results (last 24 hours)     ** No results found for the last 24 hours. **          Medication Review:       amLODIPine 10 mg Oral Daily   budesonide 0.5 mg Nebulization BID - RT   carvedilol 12.5 mg Oral BID With Meals   ceftriaxone 1 g Intravenous Q24H   CloNIDine 0.2 mg Oral Q12H   enoxaparin 40 mg Subcutaneous Daily   hydrochlorothiazide 25 mg Oral Daily   ipratropium-albuterol 3 mL Nebulization Q4H - RT   lisinopril 40 mg Oral Daily   methylPREDNISolone sodium succinate 40 mg Intravenous Q6H            Assessment/Plan     Problem List Items Addressed This Visit     * (Principal)Pneumonia of both upper lobes due to infectious organism - Primary          1.  Acute hypoxic respiratory failure, present on admission.  2.  Bilateral diffuse bacterial pneumonia, present on admission.  3.  Acute COPD exacerbation.  4.  Coronary artery disease on medical therapy.  5.  Essential hypertension.  6.  Seizure disorder.     Plan:    Continue with current medication regimen.  Continue with Rocephin, Zithromax, Solu-Medrol, DuoNeb.  We'll transfer out of the intensive care unit.  Continue with high flow oxygen.  Cultures  have been negative thus far.  Further recommendations will depend on the clinical course.    Josemanuel Downs DO  09/14/17  12:59 PM

## 2017-09-14 NOTE — PROGRESS NOTES
Continued Stay Note   [facility]     Patient Name: Tesfaye Ang  MRN: [id number]  Today's Date: 9/14/2017    Admit Date: 9/9/2017          Discharge Plan       09/14/17 1020    Case Management/Social Work Plan    Plan Follow up for discharge planning. SW spoke to patient. Working part time, walks with cane. No home health, no O2 yet at home.  No medication access issues. No transportation issues.  Lives with wife who is his POA.     Additional Comments Will continue to follow and assist as needed.     Final Note    Final Note Following for social and discharge planning.               Discharge Codes     None        Expected Discharge Date and Time     Expected Discharge Date Expected Discharge Time    Sep 12, 2017             Ivett Menezes

## 2017-09-15 ENCOUNTER — APPOINTMENT (OUTPATIENT)
Dept: GENERAL RADIOLOGY | Facility: HOSPITAL | Age: 42
End: 2017-09-15

## 2017-09-15 LAB
ALBUMIN SERPL-MCNC: 3.3 G/DL (ref 3.5–5)
ANION GAP SERPL CALCULATED.3IONS-SCNC: 12.2 MMOL/L
BASOPHILS # BLD AUTO: 0.04 10*3/MM3 (ref 0–0.2)
BASOPHILS NFR BLD AUTO: 0.3 % (ref 0–2.5)
BUN BLD-MCNC: 29 MG/DL (ref 7–20)
BUN/CREAT SERPL: 36.3 (ref 6.3–21.9)
CALCIUM SPEC-SCNC: 9 MG/DL (ref 8.4–10.2)
CHLORIDE SERPL-SCNC: 100 MMOL/L (ref 98–107)
CO2 SERPL-SCNC: 31 MMOL/L (ref 26–30)
CREAT BLD-MCNC: 0.8 MG/DL (ref 0.6–1.3)
DEPRECATED RDW RBC AUTO: 41.7 FL (ref 37–54)
EOSINOPHIL # BLD AUTO: 0 10*3/MM3 (ref 0–0.7)
EOSINOPHIL NFR BLD AUTO: 0 % (ref 0–7)
ERYTHROCYTE [DISTWIDTH] IN BLOOD BY AUTOMATED COUNT: 13.8 % (ref 11.5–14.5)
GFR SERPL CREATININE-BSD FRML MDRD: 107 ML/MIN/1.73
GLUCOSE BLD-MCNC: 131 MG/DL (ref 74–98)
HCT VFR BLD AUTO: 48.3 % (ref 42–52)
HGB BLD-MCNC: 16.2 G/DL (ref 14–18)
IMM GRANULOCYTES # BLD: 0.53 10*3/MM3 (ref 0–0.06)
IMM GRANULOCYTES NFR BLD: 3.9 % (ref 0–0.6)
LYMPHOCYTES # BLD AUTO: 1.04 10*3/MM3 (ref 0.6–3.4)
LYMPHOCYTES NFR BLD AUTO: 7.6 % (ref 10–50)
MAGNESIUM SERPL-MCNC: 2.2 MG/DL (ref 1.6–2.3)
MCH RBC QN AUTO: 27.9 PG (ref 27–31)
MCHC RBC AUTO-ENTMCNC: 33.5 G/DL (ref 30–37)
MCV RBC AUTO: 83.3 FL (ref 80–94)
MONOCYTES # BLD AUTO: 0.32 10*3/MM3 (ref 0–0.9)
MONOCYTES NFR BLD AUTO: 2.3 % (ref 0–12)
NEUTROPHILS # BLD AUTO: 11.81 10*3/MM3 (ref 2–6.9)
NEUTROPHILS NFR BLD AUTO: 85.9 % (ref 37–80)
NRBC BLD MANUAL-RTO: 0 /100 WBC (ref 0–0)
PHOSPHATE SERPL-MCNC: 4.7 MG/DL (ref 2.5–4.5)
PLATELET # BLD AUTO: 192 10*3/MM3 (ref 130–400)
PMV BLD AUTO: 10.2 FL (ref 6–12)
POTASSIUM BLD-SCNC: 4.2 MMOL/L (ref 3.5–5.1)
RBC # BLD AUTO: 5.8 10*6/MM3 (ref 4.7–6.1)
SODIUM BLD-SCNC: 139 MMOL/L (ref 137–145)
WBC NRBC COR # BLD: 13.74 10*3/MM3 (ref 4.8–10.8)

## 2017-09-15 PROCEDURE — 25010000002 CEFTRIAXONE: Performed by: INTERNAL MEDICINE

## 2017-09-15 PROCEDURE — 94799 UNLISTED PULMONARY SVC/PX: CPT

## 2017-09-15 PROCEDURE — 85025 COMPLETE CBC W/AUTO DIFF WBC: CPT | Performed by: INTERNAL MEDICINE

## 2017-09-15 PROCEDURE — 25010000002 METHYLPREDNISOLONE PER 40 MG: Performed by: INTERNAL MEDICINE

## 2017-09-15 PROCEDURE — 25010000002 ENOXAPARIN PER 10 MG: Performed by: INTERNAL MEDICINE

## 2017-09-15 PROCEDURE — 71010 HC CHEST PA OR AP: CPT

## 2017-09-15 PROCEDURE — 83735 ASSAY OF MAGNESIUM: CPT | Performed by: INTERNAL MEDICINE

## 2017-09-15 PROCEDURE — 99232 SBSQ HOSP IP/OBS MODERATE 35: CPT | Performed by: INTERNAL MEDICINE

## 2017-09-15 PROCEDURE — 80069 RENAL FUNCTION PANEL: CPT | Performed by: INTERNAL MEDICINE

## 2017-09-15 RX ADMIN — METHYLPREDNISOLONE SODIUM SUCCINATE 40 MG: 40 INJECTION, POWDER, FOR SOLUTION INTRAMUSCULAR; INTRAVENOUS at 17:07

## 2017-09-15 RX ADMIN — HYDROCHLOROTHIAZIDE 25 MG: 25 TABLET ORAL at 09:13

## 2017-09-15 RX ADMIN — HYDROCODONE BITARTRATE AND ACETAMINOPHEN 1 TABLET: 7.5; 325 TABLET ORAL at 09:14

## 2017-09-15 RX ADMIN — HYDROCODONE BITARTRATE AND ACETAMINOPHEN 1 TABLET: 7.5; 325 TABLET ORAL at 20:55

## 2017-09-15 RX ADMIN — BENZONATATE 200 MG: 100 CAPSULE, LIQUID FILLED ORAL at 15:46

## 2017-09-15 RX ADMIN — AMLODIPINE BESYLATE 10 MG: 5 TABLET ORAL at 09:13

## 2017-09-15 RX ADMIN — CYCLOBENZAPRINE HYDROCHLORIDE 5 MG: 10 TABLET, FILM COATED ORAL at 20:55

## 2017-09-15 RX ADMIN — HYDROCODONE POLISTIREX AND CHLORPHENIRAMINE POLISTIREX 5 ML: 10; 8 SUSPENSION, EXTENDED RELEASE ORAL at 15:47

## 2017-09-15 RX ADMIN — ENOXAPARIN SODIUM 40 MG: 40 INJECTION SUBCUTANEOUS at 09:12

## 2017-09-15 RX ADMIN — CARVEDILOL 12.5 MG: 12.5 TABLET, FILM COATED ORAL at 17:07

## 2017-09-15 RX ADMIN — HYDROCODONE BITARTRATE AND ACETAMINOPHEN 1 TABLET: 7.5; 325 TABLET ORAL at 03:43

## 2017-09-15 RX ADMIN — METHYLPREDNISOLONE SODIUM SUCCINATE 40 MG: 40 INJECTION, POWDER, FOR SOLUTION INTRAMUSCULAR; INTRAVENOUS at 05:57

## 2017-09-15 RX ADMIN — CEFTRIAXONE 1 G: 1 INJECTION, POWDER, FOR SOLUTION INTRAMUSCULAR; INTRAVENOUS at 17:06

## 2017-09-15 RX ADMIN — LISINOPRIL 40 MG: 20 TABLET ORAL at 09:12

## 2017-09-15 RX ADMIN — BENZONATATE 200 MG: 100 CAPSULE, LIQUID FILLED ORAL at 09:14

## 2017-09-15 RX ADMIN — IPRATROPIUM BROMIDE AND ALBUTEROL SULFATE 3 ML: .5; 3 SOLUTION RESPIRATORY (INHALATION) at 13:03

## 2017-09-15 RX ADMIN — HYDROCODONE POLISTIREX AND CHLORPHENIRAMINE POLISTIREX 5 ML: 10; 8 SUSPENSION, EXTENDED RELEASE ORAL at 03:43

## 2017-09-15 RX ADMIN — CLONIDINE HYDROCHLORIDE 0.2 MG: 0.2 TABLET ORAL at 20:55

## 2017-09-15 RX ADMIN — IPRATROPIUM BROMIDE AND ALBUTEROL SULFATE 3 ML: .5; 3 SOLUTION RESPIRATORY (INHALATION) at 03:30

## 2017-09-15 RX ADMIN — CLONIDINE HYDROCHLORIDE 0.2 MG: 0.2 TABLET ORAL at 09:13

## 2017-09-15 RX ADMIN — METHYLPREDNISOLONE SODIUM SUCCINATE 40 MG: 40 INJECTION, POWDER, FOR SOLUTION INTRAMUSCULAR; INTRAVENOUS at 12:04

## 2017-09-15 RX ADMIN — HYDROCODONE POLISTIREX AND CHLORPHENIRAMINE POLISTIREX 5 ML: 10; 8 SUSPENSION, EXTENDED RELEASE ORAL at 20:55

## 2017-09-15 RX ADMIN — CARVEDILOL 12.5 MG: 12.5 TABLET, FILM COATED ORAL at 09:13

## 2017-09-15 RX ADMIN — BUDESONIDE 0.5 MG: 0.5 INHALANT RESPIRATORY (INHALATION) at 19:15

## 2017-09-15 RX ADMIN — HYDROCODONE BITARTRATE AND ACETAMINOPHEN 1 TABLET: 7.5; 325 TABLET ORAL at 15:46

## 2017-09-15 RX ADMIN — IPRATROPIUM BROMIDE AND ALBUTEROL SULFATE 3 ML: .5; 3 SOLUTION RESPIRATORY (INHALATION) at 19:14

## 2017-09-15 RX ADMIN — HYDROCODONE POLISTIREX AND CHLORPHENIRAMINE POLISTIREX 5 ML: 10; 8 SUSPENSION, EXTENDED RELEASE ORAL at 09:23

## 2017-09-15 NOTE — PLAN OF CARE
Problem: Patient Care Overview (Adult)  Goal: Plan of Care Review  Outcome: Ongoing (interventions implemented as appropriate)    09/15/17 0431   Coping/Psychosocial Response Interventions   Plan Of Care Reviewed With patient   Patient Care Overview   Progress improving   Outcome Evaluation   Outcome Summary/Follow up Plan Patient was transferred from ICU this shift. The patient is currently on high flow nasal cannula at 60% and 25 L. The patient has had a dry productive cough that is relieved with tussinex or tessalon. The patient has complained of pain in his left groin due to a hernia when he coughs but pain is releived with [place]. Patient states he is feeling better, will continue to monitor at this time.

## 2017-09-15 NOTE — PLAN OF CARE
Problem: Pneumonia (Adult)  Goal: Signs and Symptoms of Listed Potential Problems Will be Absent or Manageable (Pneumonia)  Outcome: Ongoing (interventions implemented as appropriate)  Goal: Signs and Symptoms of Listed Potential Problems Will be Absent or Manageable (Pneumonia)  Outcome: Ongoing (interventions implemented as appropriate)    Problem: Patient Care Overview (Adult)  Goal: Plan of Care Review  Outcome: Ongoing (interventions implemented as appropriate)    09/15/17 0431 09/15/17 1830   Coping/Psychosocial Response Interventions   Plan Of Care Reviewed With --  patient   Patient Care Overview   Progress --  improving   Outcome Evaluation   Outcome Summary/Follow up Plan Patient was transferred from ICU this shift. The patient is currently on high flow nasal cannula at 60% and 25 L. The patient has had a dry productive cough that is relieved with tussinex or tessalon. The patient has complained of pain in his left groin due to a hernia when he coughs but pain is releived with [place]. Patient states he is feeling better, will continue to monitor at this time.  --        Goal: Adult Individualization and Mutuality  Outcome: Ongoing (interventions implemented as appropriate)  Goal: Discharge Needs Assessment  Outcome: Ongoing (interventions implemented as appropriate)

## 2017-09-15 NOTE — PROGRESS NOTES
"  CC: Acute Respiratory Failure. PNA.     S: Continues to have dry cough. Is C/O diffuse lower chest wall pain, when he coughs.  He is also complaining of lower abdominal pain with cough.  Feels that the shortness of breath is definitely improved overall.    O:Vital signs reviewed. O2Sat: 90% on 65% (22LPM).   /96  Pulse 61  Temp 97.3 °F (36.3 °C) (Oral)   Resp 20  Ht 66\" (167.6 cm)  Wt 202 lb 6.4 oz (91.8 kg)  SpO2 93%  BMI 32.67 kg/m2      I & Os reviewed.   Intake/Output       09/14/17 0700 - 09/15/17 0659 09/15/17 0700 - 09/16/17 0659    Intake (ml) 954 240    Output (ml) 1925 --    Net (ml) -971 240    Last Weight 202 lb 6.4 oz (91.8 kg) --          General: Mild acute distress noted.  Eyes: PERRL. EOM intact.  Cardiovascular: S1 + S2. Regular.   Respiratory: Mild to moderate respiratory distress noted. No wheezing heard. B/L basal crackles noted  Extremities: No edema noted.  Neurologic: AAOx3. Was able to follow commands.    Skin: Appeared somewhat dry     Labs: Reviewed.       Results from last 7 days  Lab Units 09/15/17  0607 09/14/17  0408 09/13/17  0409  09/11/17  0414 09/10/17  0606 09/09/17  1356   SODIUM mmol/L 139 140 140  < > 136* 141 140   POTASSIUM mmol/L 4.2 4.0 3.8  < > 5.1 3.9 3.6   CHLORIDE mmol/L 100 102 104  < > 109* 112* 107   CO2 mmol/L 31.0* 29.0 29.0  < > 17.0* 20.0* 19.0*   BUN mg/dL 29* 29* 26*  < > 23* 18 15   CREATININE mg/dL 0.80 0.70 0.80  < > 0.90 0.70 0.90   CALCIUM mg/dL 9.0 9.1 8.9  < > 8.9 8.8 9.3   BILIRUBIN mg/dL  --   --   --   --  0.6 0.3 0.8   ALK PHOS U/L  --   --   --   --  59 52 68   ALT (SGPT) U/L  --   --   --   --  31 35 35   AST (SGOT) U/L  --   --   --   --  64* 31 30   GLUCOSE mg/dL 131* 128* 138*  < > 126* 138* 122*   < > = values in this interval not displayed.      Results from last 7 days  Lab Units 09/15/17  0607 09/14/17  0408 09/13/17  0409 09/12/17  0607 09/11/17  0414   WBC 10*3/mm3 13.74* 12.62* 13.28* 17.77* 19.99*   HEMOGLOBIN g/dL 16.2 " 15.5 14.9 15.2 14.9   PLATELETS 10*3/mm3 192 177 170 179 173       Lab Results   Component Value Date    PHART 7.438 09/13/2017    WBQ1ZOE 44.3 09/13/2017    PO2ART 94.2 09/13/2017    HGBBG 15.1 09/13/2017    S8POOCUR 96.4 09/13/2017       CXRay: Improvement noted    Assessment & Recommendations/Plan:   1. Acute Hypoxic Respiratory Failure.    - Still requiring high dose of O2.  - Will taper O2.    2. Likely PNA.  -  Strep and Legionella antigen negative.    3. Abnl CT.    4. Smoking.     Continue Tussionex every 6 hours when necessary.    Will also try Lidocaine Nebulized Q6 PRN. Will need to stay NPO for 45-60 minutes after the nebulized treatment.     Repeat Chest X Ray on Sunday.    We'll recommend continuation of current dose of steroids for the next 24-48 hours.        We have reviewed patient's current orders and changes, if any, have been suggested to primary care team. Plan was also discussed with nursing staff, as necessary.     Elvis Oconnor MD  09/15/17  9:35 AM    Dictated utilizing Dragon dictation.

## 2017-09-15 NOTE — PROGRESS NOTES
[facility]    Name:  Tesfaye Ang   Age:  41 y.o.  Sex:  male  :  1975  MRN:  [id number]   Visit Number:  [id number]  Admission Date:  2017  Date Of Service:  09/15/17  Primary Care Physician:  Karlee Thomas MD     LOS: 5 days :  Patient Care Team:  Karlee Thomas MD as PCP - General (Family Medicine):    History taken from:     patient    Chief Complaint:      Cough    Subjective     Interval History:     Patient seen today.  He continues to have cough with shortness of breath.  He is admitted with acute hypoxic respiratory failure with bilateral pneumonia requiring high flow oxygen.  He has left inguinal pain from a hernia that is worse with coughing.  Spoke to Dr. Blackmon who recommended ice and pressure for this, with outpatient follow-up.  Discussed case with Dr. Oconnor.  Patient is slowly improving.  Patient denies any chest pressure, nausea, vomiting, or diarrhea.  He continues to be short of breath with persistent cough which is nonproductive.    Review of Systems:     All systems were reviewed and negative except for:  Respiratory: positive for  cough, dry and shortness of air    Objective     Vital Signs:    Temp:  [97.3 °F (36.3 °C)-97.8 °F (36.6 °C)] 97.3 °F (36.3 °C)  Heart Rate:  [55-88] 61  Resp:  [18-24] 20  BP: (130-170)/() 145/96    Physical Exam:      General Appearance:    Alert, cooperative, in no acute distress   Head:    Normocephalic, without obvious abnormality, atraumatic   Eyes:            Lids and lashes normal, conjunctivae and sclerae normal, no   icterus, no pallor, corneas clear, PERRLA   Ears:    Ears appear intact with no abnormalities noted   Throat:   No oral lesions, no thrush, oral mucosa moist   Neck:   No adenopathy, supple, trachea midline, no thyromegaly, no   carotid bruit, no JVD   Back:     No kyphosis present, no scoliosis present, no skin lesions,      erythema or scars, no tenderness to  percussion or                   palpation,   range of motion normal   Lungs:     Clear to auscultation,respirations regular, even and                  unlabored    Heart:    Regular rhythm and normal rate, normal S1 and S2, no            murmur, no gallop, no rub, no click   Chest Wall:    No abnormalities observed   Abdomen:     Normal bowel sounds, no masses, no organomegaly, soft        non-tender, non-distended, no guarding, no rebound                tenderness   Rectal:     Deferred   Extremities:   Moves all extremities well, no edema, no cyanosis, no             redness   Pulses:   Pulses palpable and equal bilaterally   Skin:   No bleeding, bruising or rash   Lymph nodes:   No palpable adenopathy   Neurologic:   Cranial nerves 2 - 12 grossly intact, sensation intact, DTR       present and equal bilaterally          Results Review:      I reviewed the patient's new clinical results.    Labs:  Lab Results (last 24 hours)     Procedure Component Value Units Date/Time    Blood Culture [[id number]]  (Normal) Collected:  09/09/17 1440    Specimen:  Blood from Hand, Right Updated:  09/14/17 1601     Blood Culture No growth at 5 days    Blood Culture [[id number]]  (Normal) Collected:  09/09/17 1432    Specimen:  Blood from Hand, Left Updated:  09/14/17 1601     Blood Culture No growth at 5 days    Renal Function Panel [[id number]]  (Abnormal) Collected:  09/15/17 0607    Specimen:  Blood Updated:  09/15/17 0637     Glucose 131 (H) mg/dL      BUN 29 (H) mg/dL      Creatinine 0.80 mg/dL      Sodium 139 mmol/L      Potassium 4.2 mmol/L      Chloride 100 mmol/L      CO2 31.0 (H) mmol/L      Calcium 9.0 mg/dL      Albumin 3.30 (L) g/dL      Phosphorus 4.7 (H) mg/dL      Anion Gap 12.2 mmol/L      BUN/Creatinine Ratio 36.3 (H)     eGFR Non  Amer 107 mL/min/1.73     Narrative:       Abnormal estimated GFR should be followed by more specific studies to confirm end stage chronic renal disease. The equation used for  calculation may not be accurate for patients less than 19 years old, greater than 70 years old, patients at extremes of weight, malnutrition, or with acute renal dysfunction.    Magnesium [[id number]]  (Normal) Collected:  09/15/17 0607    Specimen:  Blood Updated:  09/15/17 0637     Magnesium 2.2 mg/dL     CBC & Differential [[id number]] Collected:  09/15/17 0607    Specimen:  Blood Updated:  09/15/17 0639    Narrative:       The following orders were created for panel order CBC & Differential.  Procedure                               Abnormality         Status                     ---------                               -----------         ------                     CBC Auto Differential[[id number]]        Abnormal            Final result                 Please view results for these tests on the individual orders.    CBC Auto Differential [[id number]]  (Abnormal) Collected:  09/15/17 0607    Specimen:  Blood Updated:  09/15/17 0639     WBC 13.74 (H) 10*3/mm3      RBC 5.80 10*6/mm3      Hemoglobin 16.2 g/dL      Hematocrit 48.3 %      MCV 83.3 fL      MCH 27.9 pg      MCHC 33.5 g/dL      RDW 13.8 %      RDW-SD 41.7 fl      MPV 10.2 fL      Platelets 192 10*3/mm3      Neutrophil % 85.9 (H) %      Lymphocyte % 7.6 (L) %      Monocyte % 2.3 %      Eosinophil % 0.0 %      Basophil % 0.3 %      Immature Grans % 3.9 (H) %      Neutrophils, Absolute 11.81 (H) 10*3/mm3      Lymphocytes, Absolute 1.04 10*3/mm3      Monocytes, Absolute 0.32 10*3/mm3      Eosinophils, Absolute 0.00 10*3/mm3      Basophils, Absolute 0.04 10*3/mm3      Immature Grans, Absolute 0.53 (H) 10*3/mm3      nRBC 0.0 /100 WBC            Radiology:    Imaging Results (last 24 hours)     Procedure Component Value Units Date/Time    XR Chest 1 View [[id number]] Collected:  09/15/17 0757     Updated:  09/15/17 0801    Narrative:       PROCEDURE: XR CHEST 1 VW-     HISTORY: PNA; J18.9-Pneumonia, unspecified organism     COMPARISON: September 13, 2017.      FINDINGS: The heart is normal in size. The mediastinum is unremarkable.  There is improved aeration of the right lung base when compared to the  prior exam. There is a persistent left basilar opacity and there are  also increased interstitial markings. There is no pneumothorax.  There  are no acute osseous abnormalities.           Impression:       Improved aeration of the right lung base with persistent  increased interstitial markings and left basilar airspace disease.     Continued followup is recommended.     This report was finalized on 9/15/2017 7:59 AM by Simba Cabalelro M.D..          Medication Review:       amLODIPine 10 mg Oral Daily   budesonide 0.5 mg Nebulization BID - RT   carvedilol 12.5 mg Oral BID With Meals   ceftriaxone 1 g Intravenous Q24H   CloNIDine 0.2 mg Oral Q12H   enoxaparin 40 mg Subcutaneous Daily   hydrochlorothiazide 25 mg Oral Daily   ipratropium-albuterol 3 mL Nebulization Q4H - RT   lisinopril 40 mg Oral Daily   methylPREDNISolone sodium succinate 40 mg Intravenous Q6H            Assessment/Plan     Problem List Items Addressed This Visit     * (Principal)Pneumonia of both upper lobes due to infectious organism - Primary          1.  Acute hypoxic respiratory failure, present on admission.  2.  Bilateral diffuse bacterial pneumonia, present on admission.  3.  Acute COPD exacerbation.  4.  Coronary artery disease on medical therapy.  5.  Essential hypertension.  6.  Seizure disorder  7. Left inguinal hernia    Plan:    Continue with current medication regimen.  Continue with Rocephin, Zithromax, Solu-Medrol, DuoNeb.  Continue with high flow oxygen.  Discussed case with Dr. Oconnor.  Patient will likely be here through next week.  Check lab work in the suyapa Downs DO  09/15/17  11:07 AM

## 2017-09-16 LAB
ALBUMIN SERPL-MCNC: 3.2 G/DL (ref 3.5–5)
ALBUMIN/GLOB SERPL: 1.2 G/DL (ref 1–2)
ALP SERPL-CCNC: 42 U/L (ref 38–126)
ALT SERPL W P-5'-P-CCNC: 144 U/L (ref 13–69)
ANION GAP SERPL CALCULATED.3IONS-SCNC: 11.5 MMOL/L
AST SERPL-CCNC: 37 U/L (ref 15–46)
BILIRUB SERPL-MCNC: 0.4 MG/DL (ref 0.2–1.3)
BUN BLD-MCNC: 32 MG/DL (ref 7–20)
BUN/CREAT SERPL: 45.7 (ref 6.3–21.9)
CALCIUM SPEC-SCNC: 9 MG/DL (ref 8.4–10.2)
CHLORIDE SERPL-SCNC: 100 MMOL/L (ref 98–107)
CO2 SERPL-SCNC: 30 MMOL/L (ref 26–30)
CREAT BLD-MCNC: 0.7 MG/DL (ref 0.6–1.3)
DEPRECATED RDW RBC AUTO: 41.6 FL (ref 37–54)
ERYTHROCYTE [DISTWIDTH] IN BLOOD BY AUTOMATED COUNT: 13.9 % (ref 11.5–14.5)
GFR SERPL CREATININE-BSD FRML MDRD: 124 ML/MIN/1.73
GLOBULIN UR ELPH-MCNC: 2.6 GM/DL
GLUCOSE BLD-MCNC: 126 MG/DL (ref 74–98)
HCT VFR BLD AUTO: 50.1 % (ref 42–52)
HGB BLD-MCNC: 16.4 G/DL (ref 14–18)
LYMPHOCYTES # BLD MANUAL: 0.63 10*3/MM3 (ref 0.6–3.4)
LYMPHOCYTES NFR BLD MANUAL: 4 % (ref 0–12)
LYMPHOCYTES NFR BLD MANUAL: 4 % (ref 10–50)
MCH RBC QN AUTO: 27.2 PG (ref 27–31)
MCHC RBC AUTO-ENTMCNC: 32.7 G/DL (ref 30–37)
MCV RBC AUTO: 83.2 FL (ref 80–94)
MONOCYTES # BLD AUTO: 0.63 10*3/MM3 (ref 0–0.9)
NEUTROPHILS # BLD AUTO: 14.42 10*3/MM3 (ref 2–6.9)
NEUTROPHILS NFR BLD MANUAL: 90 % (ref 37–80)
NEUTS BAND NFR BLD MANUAL: 2 % (ref 0–6)
PLATELET # BLD AUTO: 205 10*3/MM3 (ref 130–400)
PMV BLD AUTO: 10.2 FL (ref 6–12)
POTASSIUM BLD-SCNC: 4.5 MMOL/L (ref 3.5–5.1)
PROT SERPL-MCNC: 5.8 G/DL (ref 6.3–8.2)
RBC # BLD AUTO: 6.02 10*6/MM3 (ref 4.7–6.1)
RBC MORPH BLD: NORMAL
SCAN SLIDE: NORMAL
SMALL PLATELETS BLD QL SMEAR: ADEQUATE
SODIUM BLD-SCNC: 137 MMOL/L (ref 137–145)
WBC MORPH BLD: NORMAL
WBC NRBC COR # BLD: 15.67 10*3/MM3 (ref 4.8–10.8)

## 2017-09-16 PROCEDURE — 94799 UNLISTED PULMONARY SVC/PX: CPT

## 2017-09-16 PROCEDURE — 85007 BL SMEAR W/DIFF WBC COUNT: CPT | Performed by: INTERNAL MEDICINE

## 2017-09-16 PROCEDURE — 86704 HEP B CORE ANTIBODY TOTAL: CPT | Performed by: INTERNAL MEDICINE

## 2017-09-16 PROCEDURE — 86706 HEP B SURFACE ANTIBODY: CPT | Performed by: INTERNAL MEDICINE

## 2017-09-16 PROCEDURE — 25010000002 METHYLPREDNISOLONE PER 40 MG: Performed by: INTERNAL MEDICINE

## 2017-09-16 PROCEDURE — 87350 HEPATITIS BE AG IA: CPT | Performed by: INTERNAL MEDICINE

## 2017-09-16 PROCEDURE — 80074 ACUTE HEPATITIS PANEL: CPT | Performed by: INTERNAL MEDICINE

## 2017-09-16 PROCEDURE — 80053 COMPREHEN METABOLIC PANEL: CPT | Performed by: INTERNAL MEDICINE

## 2017-09-16 PROCEDURE — 86707 HEPATITIS BE ANTIBODY: CPT | Performed by: INTERNAL MEDICINE

## 2017-09-16 PROCEDURE — 25010000002 CEFTRIAXONE: Performed by: INTERNAL MEDICINE

## 2017-09-16 PROCEDURE — 85025 COMPLETE CBC W/AUTO DIFF WBC: CPT | Performed by: INTERNAL MEDICINE

## 2017-09-16 PROCEDURE — 25010000002 ENOXAPARIN PER 10 MG: Performed by: INTERNAL MEDICINE

## 2017-09-16 RX ADMIN — IPRATROPIUM BROMIDE AND ALBUTEROL SULFATE 3 ML: .5; 3 SOLUTION RESPIRATORY (INHALATION) at 16:40

## 2017-09-16 RX ADMIN — HYDROCODONE POLISTIREX AND CHLORPHENIRAMINE POLISTIREX 5 ML: 10; 8 SUSPENSION, EXTENDED RELEASE ORAL at 02:11

## 2017-09-16 RX ADMIN — METHYLPREDNISOLONE SODIUM SUCCINATE 40 MG: 40 INJECTION, POWDER, FOR SOLUTION INTRAMUSCULAR; INTRAVENOUS at 12:58

## 2017-09-16 RX ADMIN — CEFTRIAXONE 1 G: 1 INJECTION, POWDER, FOR SOLUTION INTRAMUSCULAR; INTRAVENOUS at 17:27

## 2017-09-16 RX ADMIN — BENZONATATE 200 MG: 100 CAPSULE, LIQUID FILLED ORAL at 20:49

## 2017-09-16 RX ADMIN — ENOXAPARIN SODIUM 40 MG: 40 INJECTION SUBCUTANEOUS at 08:15

## 2017-09-16 RX ADMIN — AMLODIPINE BESYLATE 10 MG: 5 TABLET ORAL at 08:15

## 2017-09-16 RX ADMIN — IPRATROPIUM BROMIDE AND ALBUTEROL SULFATE 3 ML: .5; 3 SOLUTION RESPIRATORY (INHALATION) at 19:51

## 2017-09-16 RX ADMIN — BUDESONIDE 0.5 MG: 0.5 INHALANT RESPIRATORY (INHALATION) at 07:34

## 2017-09-16 RX ADMIN — HYDROCODONE BITARTRATE AND ACETAMINOPHEN 1 TABLET: 7.5; 325 TABLET ORAL at 08:16

## 2017-09-16 RX ADMIN — METHYLPREDNISOLONE SODIUM SUCCINATE 40 MG: 40 INJECTION, POWDER, FOR SOLUTION INTRAMUSCULAR; INTRAVENOUS at 17:28

## 2017-09-16 RX ADMIN — HYDROCODONE POLISTIREX AND CHLORPHENIRAMINE POLISTIREX 5 ML: 10; 8 SUSPENSION, EXTENDED RELEASE ORAL at 14:21

## 2017-09-16 RX ADMIN — HYDROCODONE BITARTRATE AND ACETAMINOPHEN 1 TABLET: 7.5; 325 TABLET ORAL at 02:11

## 2017-09-16 RX ADMIN — CLONIDINE HYDROCHLORIDE 0.2 MG: 0.2 TABLET ORAL at 08:16

## 2017-09-16 RX ADMIN — LISINOPRIL 40 MG: 20 TABLET ORAL at 08:16

## 2017-09-16 RX ADMIN — BUDESONIDE 0.5 MG: 0.5 INHALANT RESPIRATORY (INHALATION) at 19:51

## 2017-09-16 RX ADMIN — HYDROCODONE BITARTRATE AND ACETAMINOPHEN 1 TABLET: 7.5; 325 TABLET ORAL at 14:21

## 2017-09-16 RX ADMIN — CARVEDILOL 12.5 MG: 12.5 TABLET, FILM COATED ORAL at 17:27

## 2017-09-16 RX ADMIN — CLONIDINE HYDROCHLORIDE 0.2 MG: 0.2 TABLET ORAL at 20:50

## 2017-09-16 RX ADMIN — CARVEDILOL 12.5 MG: 12.5 TABLET, FILM COATED ORAL at 08:16

## 2017-09-16 RX ADMIN — HYDROCODONE POLISTIREX AND CHLORPHENIRAMINE POLISTIREX 5 ML: 10; 8 SUSPENSION, EXTENDED RELEASE ORAL at 08:15

## 2017-09-16 RX ADMIN — METHYLPREDNISOLONE SODIUM SUCCINATE 40 MG: 40 INJECTION, POWDER, FOR SOLUTION INTRAMUSCULAR; INTRAVENOUS at 06:00

## 2017-09-16 RX ADMIN — IPRATROPIUM BROMIDE AND ALBUTEROL SULFATE 3 ML: .5; 3 SOLUTION RESPIRATORY (INHALATION) at 04:11

## 2017-09-16 RX ADMIN — HYDROCODONE POLISTIREX AND CHLORPHENIRAMINE POLISTIREX 5 ML: 10; 8 SUSPENSION, EXTENDED RELEASE ORAL at 21:19

## 2017-09-16 RX ADMIN — HYDROCHLOROTHIAZIDE 25 MG: 25 TABLET ORAL at 08:16

## 2017-09-16 RX ADMIN — IPRATROPIUM BROMIDE AND ALBUTEROL SULFATE 3 ML: .5; 3 SOLUTION RESPIRATORY (INHALATION) at 00:18

## 2017-09-16 RX ADMIN — METHYLPREDNISOLONE SODIUM SUCCINATE 40 MG: 40 INJECTION, POWDER, FOR SOLUTION INTRAMUSCULAR; INTRAVENOUS at 00:00

## 2017-09-16 RX ADMIN — IPRATROPIUM BROMIDE AND ALBUTEROL SULFATE 3 ML: .5; 3 SOLUTION RESPIRATORY (INHALATION) at 23:07

## 2017-09-16 RX ADMIN — IPRATROPIUM BROMIDE AND ALBUTEROL SULFATE 3 ML: .5; 3 SOLUTION RESPIRATORY (INHALATION) at 07:34

## 2017-09-16 RX ADMIN — HYDROCODONE BITARTRATE AND ACETAMINOPHEN 1 TABLET: 7.5; 325 TABLET ORAL at 20:50

## 2017-09-16 NOTE — PLAN OF CARE
Problem: Patient Care Overview (Adult)  Goal: Adult Individualization and Mutuality  Outcome: Ongoing (interventions implemented as appropriate)    09/09/17 1930   Individualization   Patient Specific Preferences likes to be called Tesfaye   Patient Specific Goals get better and go home   Patient Specific Interventions IV antibiotics, IV fluids, nebs per RT   Mutuality/Individual Preferences   What Anxieties, Fears or Concerns Do You Have About Your Health or Care? no concerns voiced at this time   What Questions Do You Have About Your Health or Care? no questions asked at this time   What Information Would Help Us Give You More Personalized Care? no information requested at this time

## 2017-09-16 NOTE — PLAN OF CARE
Problem: Patient Care Overview (Adult)  Goal: Discharge Needs Assessment    09/15/17 5485   Discharge Needs Assessment   Concerns To Be Addressed no discharge needs identified;denies needs/concerns at this time   Readmission Within The Last 30 Days no previous admission in last 30 days   Equipment Needed After Discharge none   Discharge Disposition still a patient;home or self-care   Current Health   Anticipated Changes Related to Illness none   Self-Care   Equipment Currently Used at Home none   Living Environment   Transportation Available family or friend will provide;car

## 2017-09-16 NOTE — PROGRESS NOTES
[facility]    Name:  Tesfaye Ang   Age:  41 y.o.  Sex:  male  :  1975  MRN:  [id number]   Visit Number:  [id number]  Admission Date:  2017  Date Of Service:  17  Primary Care Physician:  Karlee Thomas MD     LOS: 6 days :  Patient Care Team:  Karlee Thomas MD as PCP - General (Family Medicine):    History taken from:     patient    Chief Complaint:      Dyspnea    Subjective     Interval History:     Patient seen today.  He continues to have cough with shortness of breath.  He is admitted with acute hypoxic respiratory failure with bilateral pneumonia requiring high flow oxygen.  He has left inguinal pain from a hernia that is worse with coughing.  Spoke to Dr. Blackmon who recommended ice and pressure for this, with outpatient follow-up.   patient has no other new complaints.   Patient is slowly improving.  Patient denies any chest pressure, nausea, vomiting, or diarrhea.  He continues to be short of breath with persistent cough which is nonproductive.  His cough is improving somewhat.    Review of Systems:     All systems were reviewed and negative except for:  Respiratory: positive for  cough, dry and shortness of air    Objective     Vital Signs:    Temp:  [97.3 °F (36.3 °C)-98.1 °F (36.7 °C)] 97.9 °F (36.6 °C)  Heart Rate:  [50-65] 59  Resp:  [18-20] 18  BP: (134-157)/() 134/84    Physical Exam:      General Appearance:    Alert, cooperative, in no acute distress   Head:    Normocephalic, without obvious abnormality, atraumatic   Eyes:            Lids and lashes normal, conjunctivae and sclerae normal, no   icterus, no pallor, corneas clear, PERRLA   Ears:    Ears appear intact with no abnormalities noted   Throat:   No oral lesions, no thrush, oral mucosa moist   Neck:   No adenopathy, supple, trachea midline, no thyromegaly, no   carotid bruit, no JVD   Back:     No kyphosis present, no scoliosis present, no skin  lesions,      erythema or scars, no tenderness to percussion or                   palpation,   range of motion normal   Lungs:     Decreased breath sounds bilaterally without uses accessory muscles respiration.      Heart:    Regular rhythm and normal rate, normal S1 and S2, no            murmur, no gallop, no rub, no click   Chest Wall:    No abnormalities observed   Abdomen:     Normal bowel sounds, no masses, no organomegaly, soft        non-tender, non-distended, no guarding, no rebound                tenderness   Rectal:     Deferred   Extremities:   Moves all extremities well, no edema, no cyanosis, no             redness   Pulses:   Pulses palpable and equal bilaterally   Skin:   No bleeding, bruising or rash   Lymph nodes:   No palpable adenopathy   Neurologic:   Cranial nerves 2 - 12 grossly intact, sensation intact, DTR       present and equal bilaterally          Results Review:      I reviewed the patient's new clinical results.    Labs:  Lab Results (last 24 hours)     Procedure Component Value Units Date/Time    Comprehensive Metabolic Panel [[id number]]  (Abnormal) Collected:  09/16/17 0540    Specimen:  Blood Updated:  09/16/17 0611     Glucose 126 (H) mg/dL      BUN 32 (H) mg/dL      Creatinine 0.70 mg/dL      Sodium 137 mmol/L      Potassium 4.5 mmol/L      Chloride 100 mmol/L      CO2 30.0 mmol/L      Calcium 9.0 mg/dL      Total Protein 5.8 (L) g/dL      Albumin 3.20 (L) g/dL      ALT (SGPT) 144 (H) U/L      AST (SGOT) 37 U/L      Alkaline Phosphatase 42 U/L      Total Bilirubin 0.4 mg/dL      eGFR Non African Amer 124 mL/min/1.73      Globulin 2.6 gm/dL      A/G Ratio 1.2 g/dL      BUN/Creatinine Ratio 45.7 (H)     Anion Gap 11.5 mmol/L     Narrative:       Abnormal estimated GFR should be followed by more specific studies to confirm end stage chronic renal disease. The equation used for calculation may not be accurate for patients less than 19 years old, greater than 70 years old, patients at  extremes of weight, malnutrition, or with acute renal dysfunction.    CBC Auto Differential [[id number]]  (Abnormal) Collected:  09/16/17 0540    Specimen:  Blood Updated:  09/16/17 0617     WBC 15.67 (H) 10*3/mm3      RBC 6.02 10*6/mm3      Hemoglobin 16.4 g/dL      Hematocrit 50.1 %      MCV 83.2 fL      MCH 27.2 pg      MCHC 32.7 g/dL      RDW 13.9 %      RDW-SD 41.6 fl      MPV 10.2 fL      Platelets 205 10*3/mm3     Scan Slide [[id number]] Collected:  09/16/17 0540    Specimen:  Blood Updated:  09/16/17 0617     Scan Slide --      See Manual Differential Results       CBC & Differential [[id number]] Collected:  09/16/17 0540    Specimen:  Blood Updated:  09/16/17 0617    Narrative:       The following orders were created for panel order CBC & Differential.  Procedure                               Abnormality         Status                     ---------                               -----------         ------                     Manual Differential[[id number]]          Abnormal            Final result               Scan Slide[[id number]]                                       Final result               CBC Auto Differential[[id number]]        Abnormal            Final result                 Please view results for these tests on the individual orders.    Manual Differential [[id number]]  (Abnormal) Collected:  09/16/17 0540    Specimen:  Blood Updated:  09/16/17 0617     Neutrophil % 90.0 (H) %      Lymphocyte % 4.0 (L) %      Monocyte % 4.0 %      Bands %  2.0 %      Neutrophils Absolute 14.42 (H) 10*3/mm3      Lymphocytes Absolute 0.63 10*3/mm3      Monocytes Absolute 0.63 10*3/mm3      RBC Morphology Normal     WBC Morphology Normal     Platelet Estimate Adequate           Radiology:    Imaging Results (last 24 hours)     ** No results found for the last 24 hours. **          Medication Review:       amLODIPine 10 mg Oral Daily   budesonide 0.5 mg Nebulization BID - RT   carvedilol 12.5 mg Oral BID With Meals    ceftriaxone 1 g Intravenous Q24H   CloNIDine 0.2 mg Oral Q12H   enoxaparin 40 mg Subcutaneous Daily   hydrochlorothiazide 25 mg Oral Daily   ipratropium-albuterol 3 mL Nebulization Q4H - RT   lisinopril 40 mg Oral Daily   methylPREDNISolone sodium succinate 40 mg Intravenous Q6H            Assessment/Plan     Problem List Items Addressed This Visit     * (Principal)Pneumonia of both upper lobes due to infectious organism - Primary          1.  Acute hypoxic respiratory failure, present on admission.  2.  Bilateral diffuse bacterial pneumonia, present on admission.  3.  Acute COPD exacerbation.  4.  Coronary artery disease on medical therapy.  5.  Essential hypertension.  6.  Seizure disorder  7. Left inguinal hernia    Plan:    Continue with Rocephin, Zithromax, Solu medrol, DuoNeb.  Continue with high flow oxygen.  Continue breathing treatments.  Check lab work in the a.m.  Continue to monitor closely.    Josemanuel Downs DO  09/16/17  12:38 PM

## 2017-09-16 NOTE — PLAN OF CARE
Problem: Pneumonia (Adult)  Goal: Signs and Symptoms of Listed Potential Problems Will be Absent or Manageable (Pneumonia)  Outcome: Ongoing (interventions implemented as appropriate)    09/16/17 1732   Pneumonia   Problems Assessed (Pneumonia) progression of infection;respiratory compromise   Problems Present (Pneumonia) none         Problem: NPPV/CPAP (Adult)  Goal: Signs and Symptoms of Listed Potential Problems Will be Absent or Manageable (NPPV/CPAP)  Outcome: Ongoing (interventions implemented as appropriate)    09/16/17 1732   NPPV/CPAP   Problems Assessed (NPPV/CPAP) all   Problems Present (NPPV/CPAP) none

## 2017-09-16 NOTE — PLAN OF CARE
Problem: Patient Care Overview (Adult)  Goal: Adult Individualization and Mutuality    09/09/17 1930   Individualization   Patient Specific Preferences likes to be called Tesfaye   Patient Specific Goals get better and go home   Patient Specific Interventions IV antibiotics, IV fluids, nebs per RT   Mutuality/Individual Preferences   What Anxieties, Fears or Concerns Do You Have About Your Health or Care? no concerns voiced at this time   What Questions Do You Have About Your Health or Care? no questions asked at this time   What Information Would Help Us Give You More Personalized Care? no information requested at this time

## 2017-09-16 NOTE — PLAN OF CARE
Problem: Pneumonia (Adult)  Goal: Signs and Symptoms of Listed Potential Problems Will be Absent or Manageable (Pneumonia)    09/16/17 0250   Pneumonia   Problems Assessed (Pneumonia) all   Problems Present (Pneumonia) progression of infection

## 2017-09-17 ENCOUNTER — APPOINTMENT (OUTPATIENT)
Dept: GENERAL RADIOLOGY | Facility: HOSPITAL | Age: 42
End: 2017-09-17

## 2017-09-17 LAB
ALBUMIN SERPL-MCNC: 3.2 G/DL (ref 3.5–5)
ALBUMIN/GLOB SERPL: 1.3 G/DL (ref 1–2)
ALP SERPL-CCNC: 40 U/L (ref 38–126)
ALT SERPL W P-5'-P-CCNC: 163 U/L (ref 13–69)
ANION GAP SERPL CALCULATED.3IONS-SCNC: 11.5 MMOL/L
AST SERPL-CCNC: 32 U/L (ref 15–46)
BILIRUB SERPL-MCNC: 0.4 MG/DL (ref 0.2–1.3)
BUN BLD-MCNC: 35 MG/DL (ref 7–20)
BUN/CREAT SERPL: 50 (ref 6.3–21.9)
CALCIUM SPEC-SCNC: 9.1 MG/DL (ref 8.4–10.2)
CHLORIDE SERPL-SCNC: 99 MMOL/L (ref 98–107)
CO2 SERPL-SCNC: 31 MMOL/L (ref 26–30)
CREAT BLD-MCNC: 0.7 MG/DL (ref 0.6–1.3)
DEPRECATED RDW RBC AUTO: 42.7 FL (ref 37–54)
ERYTHROCYTE [DISTWIDTH] IN BLOOD BY AUTOMATED COUNT: 14.2 % (ref 11.5–14.5)
GFR SERPL CREATININE-BSD FRML MDRD: 124 ML/MIN/1.73
GLOBULIN UR ELPH-MCNC: 2.5 GM/DL
GLUCOSE BLD-MCNC: 114 MG/DL (ref 74–98)
HCT VFR BLD AUTO: 48.6 % (ref 42–52)
HGB BLD-MCNC: 16.2 G/DL (ref 14–18)
LYMPHOCYTES # BLD MANUAL: 1.03 10*3/MM3 (ref 0.6–3.4)
LYMPHOCYTES NFR BLD MANUAL: 5 % (ref 0–12)
LYMPHOCYTES NFR BLD MANUAL: 6 % (ref 10–50)
MCH RBC QN AUTO: 27.8 PG (ref 27–31)
MCHC RBC AUTO-ENTMCNC: 33.3 G/DL (ref 30–37)
MCV RBC AUTO: 83.5 FL (ref 80–94)
MONOCYTES # BLD AUTO: 0.86 10*3/MM3 (ref 0–0.9)
NEUTROPHILS # BLD AUTO: 15.32 10*3/MM3 (ref 2–6.9)
NEUTROPHILS NFR BLD MANUAL: 85 % (ref 37–80)
NEUTS BAND NFR BLD MANUAL: 4 % (ref 0–6)
PLATELET # BLD AUTO: 208 10*3/MM3 (ref 130–400)
PMV BLD AUTO: 10.7 FL (ref 6–12)
POTASSIUM BLD-SCNC: 4.5 MMOL/L (ref 3.5–5.1)
PROT SERPL-MCNC: 5.7 G/DL (ref 6.3–8.2)
RBC # BLD AUTO: 5.82 10*6/MM3 (ref 4.7–6.1)
RBC MORPH BLD: NORMAL
SCAN SLIDE: NORMAL
SMALL PLATELETS BLD QL SMEAR: ADEQUATE
SODIUM BLD-SCNC: 137 MMOL/L (ref 137–145)
WBC MORPH BLD: NORMAL
WBC NRBC COR # BLD: 17.21 10*3/MM3 (ref 4.8–10.8)

## 2017-09-17 PROCEDURE — 25010000002 ENOXAPARIN PER 10 MG: Performed by: INTERNAL MEDICINE

## 2017-09-17 PROCEDURE — 25010000002 METHYLPREDNISOLONE PER 40 MG: Performed by: INTERNAL MEDICINE

## 2017-09-17 PROCEDURE — 25010000002 CEFTRIAXONE: Performed by: INTERNAL MEDICINE

## 2017-09-17 PROCEDURE — 94799 UNLISTED PULMONARY SVC/PX: CPT

## 2017-09-17 PROCEDURE — 85007 BL SMEAR W/DIFF WBC COUNT: CPT | Performed by: INTERNAL MEDICINE

## 2017-09-17 PROCEDURE — 80053 COMPREHEN METABOLIC PANEL: CPT | Performed by: INTERNAL MEDICINE

## 2017-09-17 PROCEDURE — 71010 HC CHEST PA OR AP: CPT

## 2017-09-17 PROCEDURE — 99232 SBSQ HOSP IP/OBS MODERATE 35: CPT | Performed by: INTERNAL MEDICINE

## 2017-09-17 PROCEDURE — 85025 COMPLETE CBC W/AUTO DIFF WBC: CPT | Performed by: INTERNAL MEDICINE

## 2017-09-17 RX ORDER — METHYLPREDNISOLONE SODIUM SUCCINATE 40 MG/ML
40 INJECTION, POWDER, LYOPHILIZED, FOR SOLUTION INTRAMUSCULAR; INTRAVENOUS EVERY 8 HOURS
Status: DISCONTINUED | OUTPATIENT
Start: 2017-09-17 | End: 2017-09-18

## 2017-09-17 RX ADMIN — HYDROCODONE POLISTIREX AND CHLORPHENIRAMINE POLISTIREX 5 ML: 10; 8 SUSPENSION, EXTENDED RELEASE ORAL at 03:16

## 2017-09-17 RX ADMIN — IPRATROPIUM BROMIDE AND ALBUTEROL SULFATE 3 ML: .5; 3 SOLUTION RESPIRATORY (INHALATION) at 17:01

## 2017-09-17 RX ADMIN — ENOXAPARIN SODIUM 40 MG: 40 INJECTION SUBCUTANEOUS at 09:09

## 2017-09-17 RX ADMIN — METHYLPREDNISOLONE SODIUM SUCCINATE 40 MG: 40 INJECTION, POWDER, FOR SOLUTION INTRAMUSCULAR; INTRAVENOUS at 20:36

## 2017-09-17 RX ADMIN — LISINOPRIL 40 MG: 20 TABLET ORAL at 09:10

## 2017-09-17 RX ADMIN — CLONIDINE HYDROCHLORIDE 0.2 MG: 0.2 TABLET ORAL at 09:11

## 2017-09-17 RX ADMIN — HYDROCHLOROTHIAZIDE 25 MG: 25 TABLET ORAL at 09:10

## 2017-09-17 RX ADMIN — IPRATROPIUM BROMIDE AND ALBUTEROL SULFATE 3 ML: .5; 3 SOLUTION RESPIRATORY (INHALATION) at 13:09

## 2017-09-17 RX ADMIN — BUDESONIDE 0.5 MG: 0.5 INHALANT RESPIRATORY (INHALATION) at 19:42

## 2017-09-17 RX ADMIN — IPRATROPIUM BROMIDE AND ALBUTEROL SULFATE 3 ML: .5; 3 SOLUTION RESPIRATORY (INHALATION) at 19:42

## 2017-09-17 RX ADMIN — BUDESONIDE 0.5 MG: 0.5 INHALANT RESPIRATORY (INHALATION) at 07:56

## 2017-09-17 RX ADMIN — HYDROCODONE POLISTIREX AND CHLORPHENIRAMINE POLISTIREX 5 ML: 10; 8 SUSPENSION, EXTENDED RELEASE ORAL at 09:10

## 2017-09-17 RX ADMIN — CARVEDILOL 12.5 MG: 12.5 TABLET, FILM COATED ORAL at 17:07

## 2017-09-17 RX ADMIN — HYDROCODONE BITARTRATE AND ACETAMINOPHEN 1 TABLET: 7.5; 325 TABLET ORAL at 09:11

## 2017-09-17 RX ADMIN — AMLODIPINE BESYLATE 10 MG: 5 TABLET ORAL at 09:11

## 2017-09-17 RX ADMIN — HYDROCODONE BITARTRATE AND ACETAMINOPHEN 1 TABLET: 7.5; 325 TABLET ORAL at 03:16

## 2017-09-17 RX ADMIN — METHYLPREDNISOLONE SODIUM SUCCINATE 40 MG: 40 INJECTION, POWDER, FOR SOLUTION INTRAMUSCULAR; INTRAVENOUS at 00:00

## 2017-09-17 RX ADMIN — METHYLPREDNISOLONE SODIUM SUCCINATE 40 MG: 40 INJECTION, POWDER, FOR SOLUTION INTRAMUSCULAR; INTRAVENOUS at 11:34

## 2017-09-17 RX ADMIN — CEFTRIAXONE 1 G: 1 INJECTION, POWDER, FOR SOLUTION INTRAMUSCULAR; INTRAVENOUS at 17:06

## 2017-09-17 RX ADMIN — HYDROCODONE BITARTRATE AND ACETAMINOPHEN 1 TABLET: 7.5; 325 TABLET ORAL at 20:01

## 2017-09-17 RX ADMIN — HYDROCODONE BITARTRATE AND ACETAMINOPHEN 1 TABLET: 7.5; 325 TABLET ORAL at 15:01

## 2017-09-17 RX ADMIN — BENZONATATE 200 MG: 100 CAPSULE, LIQUID FILLED ORAL at 09:10

## 2017-09-17 RX ADMIN — IPRATROPIUM BROMIDE AND ALBUTEROL SULFATE 3 ML: .5; 3 SOLUTION RESPIRATORY (INHALATION) at 04:24

## 2017-09-17 RX ADMIN — CLONIDINE HYDROCHLORIDE 0.2 MG: 0.2 TABLET ORAL at 20:36

## 2017-09-17 RX ADMIN — HYDROCODONE POLISTIREX AND CHLORPHENIRAMINE POLISTIREX 5 ML: 10; 8 SUSPENSION, EXTENDED RELEASE ORAL at 15:01

## 2017-09-17 RX ADMIN — CARVEDILOL 12.5 MG: 12.5 TABLET, FILM COATED ORAL at 09:10

## 2017-09-17 RX ADMIN — IPRATROPIUM BROMIDE AND ALBUTEROL SULFATE 3 ML: .5; 3 SOLUTION RESPIRATORY (INHALATION) at 07:53

## 2017-09-17 RX ADMIN — METHYLPREDNISOLONE SODIUM SUCCINATE 40 MG: 40 INJECTION, POWDER, FOR SOLUTION INTRAMUSCULAR; INTRAVENOUS at 06:18

## 2017-09-17 NOTE — PLAN OF CARE
Problem: Patient Care Overview (Adult)  Goal: Plan of Care Review  Outcome: Ongoing (interventions implemented as appropriate)    09/17/17 1342   Coping/Psychosocial Response Interventions   Plan Of Care Reviewed With patient   Patient Care Overview   Progress improving   Outcome Evaluation   Outcome Summary/Follow up Plan Pt still complains of a non-productive cough but his appetite has returned and patient stated he feels some better. Patient continues to get medications as ordered. Neb treatments as ordered. Has been maintaining about 95% on 22L on high flow NC.

## 2017-09-17 NOTE — PLAN OF CARE
Problem: Pneumonia (Adult)  Goal: Signs and Symptoms of Listed Potential Problems Will be Absent or Manageable (Pneumonia)  Outcome: Ongoing (interventions implemented as appropriate)  Goal: Signs and Symptoms of Listed Potential Problems Will be Absent or Manageable (Pneumonia)  Outcome: Ongoing (interventions implemented as appropriate)    Problem: Patient Care Overview (Adult)  Goal: Plan of Care Review  Outcome: Ongoing (interventions implemented as appropriate)    09/15/17 1830 09/17/17 0422   Coping/Psychosocial Response Interventions   Plan Of Care Reviewed With patient --    Patient Care Overview   Progress --  no change   Outcome Evaluation   Outcome Summary/Follow up Plan --  Pt. still complains of pain from coughing and neurpathic pain(chronic). Tessalon, Tussinex, and pain medication were given per emar schedule. Verbalized that medication is helping with pain and cough. Lung sounds still wheezy. Breathing treatment and antibiotics given per emar sched. Oxygen saturation on the upper 90's on high flow nasal cannula. Continue the plan of care.       Goal: Adult Individualization and Mutuality  Outcome: Ongoing (interventions implemented as appropriate)  Goal: Discharge Needs Assessment  Outcome: Ongoing (interventions implemented as appropriate)

## 2017-09-17 NOTE — PROGRESS NOTES
[facility]    Name:  Tesfaye Ang   Age:  41 y.o.  Sex:  male  :  1975  MRN:  [id number]   Visit Number:  [id number]  Admission Date:  2017  Date Of Service:  17  Primary Care Physician:  Karlee Thomas MD     LOS: 7 days :  Patient Care Team:  Karlee Thomas MD as PCP - General (Family Medicine):    History taken from:     patient    Chief Complaint:      Dyspnea    Subjective     Interval History:     Patient seen today.  He continues to have cough with shortness of breath.  He is admitted with acute hypoxic respiratory failure with bilateral pneumonia requiring high flow oxygen.  He has left inguinal pain from a hernia that is worse with coughing.  Spoke to Dr. Blackmon who recommended ice and pressure for this, with outpatient follow-up.   patient has no other new complaints.   Patient is slowly improving.  Patient denies any chest pressure, nausea, vomiting, or diarrhea.  He continues to be short of breath with persistent cough which is nonproductive.  His cough is improving somewhat.  He is moving around a little better.  Oxygen requirements are improving.    Review of Systems:     All systems were reviewed and negative except for:  Respiratory: positive for  shortness of air    Objective     Vital Signs:    Temp:  [97.5 °F (36.4 °C)-98.4 °F (36.9 °C)] 98.4 °F (36.9 °C)  Heart Rate:  [56-66] 65  Resp:  [16-18] 18  BP: (125-152)/(77-96) 125/77    Physical Exam:      General Appearance:    Alert, cooperative, in no acute distress   Head:    Normocephalic, without obvious abnormality, atraumatic   Eyes:            Lids and lashes normal, conjunctivae and sclerae normal, no   icterus, no pallor, corneas clear, PERRLA   Ears:    Ears appear intact with no abnormalities noted   Throat:   No oral lesions, no thrush, oral mucosa moist   Neck:   No adenopathy, supple, trachea midline, no thyromegaly, no   carotid bruit, no JVD   Back:      No kyphosis present, no scoliosis present, no skin lesions,      erythema or scars, no tenderness to percussion or                   palpation,   range of motion normal   Lungs:     Decreased breath sounds bilaterally without use of accessory muscles respiration.      Heart:    Regular rhythm and normal rate, normal S1 and S2, no            murmur, no gallop, no rub, no click   Chest Wall:    No abnormalities observed   Abdomen:     Normal bowel sounds, no masses, no organomegaly, soft        non-tender, non-distended, no guarding, no rebound                tenderness   Rectal:     Deferred   Extremities:   Moves all extremities well, no edema, no cyanosis, no             redness   Pulses:   Pulses palpable and equal bilaterally   Skin:   No bleeding, bruising or rash   Lymph nodes:   No palpable adenopathy   Neurologic:   Cranial nerves 2 - 12 grossly intact, sensation intact, DTR       present and equal bilaterally          Results Review:      I reviewed the patient's new clinical results.    Labs:  Lab Results (last 24 hours)     Procedure Component Value Units Date/Time    Comprehensive Metabolic Panel [[id number]]  (Abnormal) Collected:  09/17/17 1123    Specimen:  Blood Updated:  09/17/17 1150     Glucose 114 (H) mg/dL      BUN 35 (H) mg/dL      Creatinine 0.70 mg/dL      Sodium 137 mmol/L      Potassium 4.5 mmol/L      Chloride 99 mmol/L      CO2 31.0 (H) mmol/L      Calcium 9.1 mg/dL      Total Protein 5.7 (L) g/dL      Albumin 3.20 (L) g/dL      ALT (SGPT) 163 (H) U/L      AST (SGOT) 32 U/L      Alkaline Phosphatase 40 U/L      Total Bilirubin 0.4 mg/dL      eGFR Non African Amer 124 mL/min/1.73      Globulin 2.5 gm/dL      A/G Ratio 1.3 g/dL      BUN/Creatinine Ratio 50.0 (H)     Anion Gap 11.5 mmol/L     Narrative:       Abnormal estimated GFR should be followed by more specific studies to confirm end stage chronic renal disease. The equation used for calculation may not be accurate for patients less  than 19 years old, greater than 70 years old, patients at extremes of weight, malnutrition, or with acute renal dysfunction.    CBC Auto Differential [[id number]]  (Abnormal) Collected:  09/17/17 1123    Specimen:  Blood Updated:  09/17/17 1231     WBC 17.21 (H) 10*3/mm3      RBC 5.82 10*6/mm3      Hemoglobin 16.2 g/dL      Hematocrit 48.6 %      MCV 83.5 fL      MCH 27.8 pg      MCHC 33.3 g/dL      RDW 14.2 %      RDW-SD 42.7 fl      MPV 10.7 fL      Platelets 208 10*3/mm3     Scan Slide [[id number]] Collected:  09/17/17 1123    Specimen:  Blood Updated:  09/17/17 1231     Scan Slide --      See Manual Differential Results       Manual Differential [[id number]]  (Abnormal) Collected:  09/17/17 1123    Specimen:  Blood Updated:  09/17/17 1231     Neutrophil % 85.0 (H) %      Lymphocyte % 6.0 (L) %      Monocyte % 5.0 %      Bands %  4.0 %      Neutrophils Absolute 15.32 (H) 10*3/mm3      Lymphocytes Absolute 1.03 10*3/mm3      Monocytes Absolute 0.86 10*3/mm3      RBC Morphology Normal     WBC Morphology Normal     Platelet Estimate Adequate    CBC & Differential [[id number]] Collected:  09/17/17 1123    Specimen:  Blood Updated:  09/17/17 1231    Narrative:       The following orders were created for panel order CBC & Differential.  Procedure                               Abnormality         Status                     ---------                               -----------         ------                     Manual Differential[[id number]]          Abnormal            Final result               Scan Slide[[id number]]                                       Final result               CBC Auto Differential[[id number]]        Abnormal            Final result                 Please view results for these tests on the individual orders.           Radiology:    Imaging Results (last 24 hours)     Procedure Component Value Units Date/Time    XR Chest 1 View [[id number]] Collected:  09/17/17 1044     Updated:  09/17/17 1046    Narrative:        PROCEDURE: XR CHEST 1 VW-     INDICATION:  PNA; J18.9-Pneumonia, unspecified organism     FINDINGS:  A portable view of the chest was obtained.  Comparison is  made to a prior exam dated 9/15/2017.   Cardiac and mediastinal  silhouettes are within normal limits. Interstitial opacities and left  greater than right basilar airspace opacities have improved, but not  resolved. There continues to be a small left effusion. There is no  pneumothorax.       Impression:       Improving interstitial and alveolar opacities. Persistent  small left pleural effusion.     This report was finalized on 9/17/2017 10:44 AM by Meredith Jeong MD.          Medication Review:       amLODIPine 10 mg Oral Daily   budesonide 0.5 mg Nebulization BID - RT   carvedilol 12.5 mg Oral BID With Meals   ceftriaxone 1 g Intravenous Q24H   CloNIDine 0.2 mg Oral Q12H   enoxaparin 40 mg Subcutaneous Daily   hydrochlorothiazide 25 mg Oral Daily   ipratropium-albuterol 3 mL Nebulization Q4H - RT   lisinopril 40 mg Oral Daily   methylPREDNISolone sodium succinate 40 mg Intravenous Q8H            Assessment/Plan     Problem List Items Addressed This Visit     * (Principal)Pneumonia of both upper lobes due to infectious organism - Primary          1.  Acute hypoxic respiratory failure, present on admission.  2.  Bilateral diffuse bacterial pneumonia, present on admission.  3.  Acute COPD exacerbation.  4.  Coronary artery disease on medical therapy.  5.  Essential hypertension.  6.  Seizure disorder  7. Left inguinal hernia       Plan:    Patient is finished with Zithromax.  Rocephin will be finished tomorrow.  Weaning Solu-Medrol.  Dr. Oconnor is following.  Check lab work in the a.m.  Continue breathing treatments.  Continue Tussionex.  Outpatient surgery referral.  Hopefully patient can be discharge in the next few days.    Josemanuel Downs, DO  09/17/17  3:00 PM

## 2017-09-17 NOTE — PLAN OF CARE
Problem: Patient Care Overview (Adult)  Goal: Adult Individualization and Mutuality  Outcome: Ongoing (interventions implemented as appropriate)    09/17/17 1411   Individualization   Patient Specific Preferences likes to listen to music and keep his room cold   Patient Specific Goals to go home   Patient Specific Interventions follow plan of care   Mutuality/Individual Preferences   What Anxieties, Fears or Concerns Do You Have About Your Health or Care? none   What Questions Do You Have About Your Health or Care? none   What Information Would Help Us Give You More Personalized Care? none

## 2017-09-17 NOTE — PROGRESS NOTES
"  CC: Acute Respiratory Failure. PNA.     S: Continues to have dry cough. Feels that the shortness of breath has definitely improved.    O:Vital signs reviewed. O2Sat: 96% on 65% (22LPM).  /77 (BP Location: Left arm, Patient Position: Lying)  Pulse 65  Temp 98.4 °F (36.9 °C) (Oral)   Resp 18  Ht 66\" (167.6 cm)  Wt 200 lb (90.7 kg)  SpO2 96%  BMI 32.28 kg/m2      I & Os reviewed.   Intake/Output       09/16/17 0700 - 09/17/17 0659 09/17/17 0700 - 09/18/17 0659    Intake (ml) -- 360    Output (ml) 1575 1000    Net (ml) -1575 -640    Last Weight 200 lb (90.7 kg) --          General: Mild acute distress noted.  Eyes: PERRL. EOM intact.  Cardiovascular: S1 + S2. Regular.   Respiratory: Mild to moderate respiratory distress noted. No wheezing heard. B/L basal crackles noted  Extremities: No edema noted.  Neurologic: AAOx3. Was able to follow commands.    Skin: Appeared somewhat dry     Labs: Reviewed.       Results from last 7 days  Lab Units 09/17/17  1123 09/16/17  0540 09/15/17  0607  09/11/17  0414   SODIUM mmol/L 137 137 139  < > 136*   POTASSIUM mmol/L 4.5 4.5 4.2  < > 5.1   CHLORIDE mmol/L 99 100 100  < > 109*   CO2 mmol/L 31.0* 30.0 31.0*  < > 17.0*   BUN mg/dL 35* 32* 29*  < > 23*   CREATININE mg/dL 0.70 0.70 0.80  < > 0.90   CALCIUM mg/dL 9.1 9.0 9.0  < > 8.9   BILIRUBIN mg/dL 0.4 0.4  --   --  0.6   ALK PHOS U/L 40 42  --   --  59   ALT (SGPT) U/L 163* 144*  --   --  31   AST (SGOT) U/L 32 37  --   --  64*   GLUCOSE mg/dL 114* 126* 131*  < > 126*   < > = values in this interval not displayed.      Results from last 7 days  Lab Units 09/17/17  1123 09/16/17  0540 09/15/17  0607 09/14/17  0408 09/13/17  0409   WBC 10*3/mm3 17.21* 15.67* 13.74* 12.62* 13.28*   HEMOGLOBIN g/dL 16.2 16.4 16.2 15.5 14.9   PLATELETS 10*3/mm3 208 205 192 177 170       Lab Results   Component Value Date    PHART 7.438 09/13/2017    VTE4VAC 44.3 09/13/2017    PO2ART 94.2 09/13/2017    HGBBG 15.1 09/13/2017    Q4AQLTVP 96.4 " 09/13/2017       CXRay: Improvement noted    Assessment & Recommendations/Plan:   1. Acute Hypoxic Respiratory Failure.    - Still requiring high dose of O2.  - Will taper O2.    2. Likely PNA.  -  Strep and Legionella antigen negative.    3. Abnl CT.    4. Smoking.     5. Leukocytosis.  - Likely steroid mediated?  - No fever. Chest X Ray improving.  - Will D/C Ceftriaxone after today's dose.   - Will watch him clinically and if any fever or worsening symptoms or Chest X Ray, we can reconsider antibiotics.    Will change Tussionex to Q8 PRN.     Will decrease the dose of SoluMedrol to 40 mg IVP Q8hrly.    Will ask RT and RN to change him to HiFlo NC at 12-15 LPM to keep his O2 levels more than 90%    We have reviewed patient's current orders and changes, if any, have been suggested to primary care team. Plan was also discussed with nursing staff, as necessary.     Elvis Oconnor MD  09/17/17  2:08 PM    Dictated utilizing Dragon dictation.

## 2017-09-17 NOTE — PLAN OF CARE
Problem: Pneumonia (Adult)  Goal: Signs and Symptoms of Listed Potential Problems Will be Absent or Manageable (Pneumonia)  Outcome: Ongoing (interventions implemented as appropriate)    09/17/17 1339   Pneumonia   Problems Assessed (Pneumonia) all   Problems Present (Pneumonia) none

## 2017-09-17 NOTE — PLAN OF CARE
Problem: Patient Care Overview (Adult)  Goal: Discharge Needs Assessment  Outcome: Ongoing (interventions implemented as appropriate)    09/17/17 1412   Discharge Needs Assessment   Concerns To Be Addressed denies needs/concerns at this time   Readmission Within The Last 30 Days no previous admission in last 30 days   Equipment Needed After Discharge oxygen   Discharge Facility/Level Of Care Needs home with home health   Current Health   Anticipated Changes Related to Illness none   Self-Care   Equipment Currently Used at Home none   Living Environment   Transportation Available car;family or friend will provide

## 2017-09-18 LAB
ALBUMIN SERPL-MCNC: 3.3 G/DL (ref 3.5–5)
ANION GAP SERPL CALCULATED.3IONS-SCNC: 10.6 MMOL/L
BASOPHILS # BLD AUTO: 0.12 10*3/MM3 (ref 0–0.2)
BASOPHILS NFR BLD AUTO: 0.6 % (ref 0–2.5)
BUN BLD-MCNC: 34 MG/DL (ref 7–20)
BUN/CREAT SERPL: 37.8 (ref 6.3–21.9)
CALCIUM SPEC-SCNC: 8.8 MG/DL (ref 8.4–10.2)
CHLORIDE SERPL-SCNC: 101 MMOL/L (ref 98–107)
CO2 SERPL-SCNC: 31 MMOL/L (ref 26–30)
CREAT BLD-MCNC: 0.9 MG/DL (ref 0.6–1.3)
DEPRECATED RDW RBC AUTO: 42.9 FL (ref 37–54)
EOSINOPHIL # BLD AUTO: 0 10*3/MM3 (ref 0–0.7)
EOSINOPHIL NFR BLD AUTO: 0 % (ref 0–7)
ERYTHROCYTE [DISTWIDTH] IN BLOOD BY AUTOMATED COUNT: 14.2 % (ref 11.5–14.5)
GFR SERPL CREATININE-BSD FRML MDRD: 93 ML/MIN/1.73
GLUCOSE BLD-MCNC: 107 MG/DL (ref 74–98)
HCT VFR BLD AUTO: 52.4 % (ref 42–52)
HGB BLD-MCNC: 17.1 G/DL (ref 14–18)
IMM GRANULOCYTES # BLD: 1.41 10*3/MM3 (ref 0–0.06)
IMM GRANULOCYTES NFR BLD: 7.6 % (ref 0–0.6)
LYMPHOCYTES # BLD AUTO: 0.95 10*3/MM3 (ref 0.6–3.4)
LYMPHOCYTES NFR BLD AUTO: 5.1 % (ref 10–50)
MCH RBC QN AUTO: 27.8 PG (ref 27–31)
MCHC RBC AUTO-ENTMCNC: 32.6 G/DL (ref 30–37)
MCV RBC AUTO: 85.1 FL (ref 80–94)
MONOCYTES # BLD AUTO: 0.86 10*3/MM3 (ref 0–0.9)
MONOCYTES NFR BLD AUTO: 4.6 % (ref 0–12)
NEUTROPHILS # BLD AUTO: 15.2 10*3/MM3 (ref 2–6.9)
NEUTROPHILS NFR BLD AUTO: 82.1 % (ref 37–80)
NRBC BLD MANUAL-RTO: 0 /100 WBC (ref 0–0)
PHOSPHATE SERPL-MCNC: 4.2 MG/DL (ref 2.5–4.5)
PLAT MORPH BLD: NORMAL
PLATELET # BLD AUTO: 200 10*3/MM3 (ref 130–400)
PMV BLD AUTO: 10.2 FL (ref 6–12)
POTASSIUM BLD-SCNC: 4.6 MMOL/L (ref 3.5–5.1)
RBC # BLD AUTO: 6.16 10*6/MM3 (ref 4.7–6.1)
RBC MORPH BLD: NORMAL
SODIUM BLD-SCNC: 138 MMOL/L (ref 137–145)
WBC MORPH BLD: NORMAL
WBC NRBC COR # BLD: 18.54 10*3/MM3 (ref 4.8–10.8)

## 2017-09-18 PROCEDURE — 85025 COMPLETE CBC W/AUTO DIFF WBC: CPT | Performed by: INTERNAL MEDICINE

## 2017-09-18 PROCEDURE — 25010000002 METHYLPREDNISOLONE PER 40 MG: Performed by: INTERNAL MEDICINE

## 2017-09-18 PROCEDURE — 94799 UNLISTED PULMONARY SVC/PX: CPT

## 2017-09-18 PROCEDURE — 85007 BL SMEAR W/DIFF WBC COUNT: CPT | Performed by: INTERNAL MEDICINE

## 2017-09-18 PROCEDURE — 25010000002 ENOXAPARIN PER 10 MG: Performed by: INTERNAL MEDICINE

## 2017-09-18 PROCEDURE — 99232 SBSQ HOSP IP/OBS MODERATE 35: CPT | Performed by: INTERNAL MEDICINE

## 2017-09-18 PROCEDURE — 25010000002 METHYLPREDNISOLONE PER 80 MG: Performed by: INTERNAL MEDICINE

## 2017-09-18 PROCEDURE — 80069 RENAL FUNCTION PANEL: CPT | Performed by: INTERNAL MEDICINE

## 2017-09-18 RX ORDER — IPRATROPIUM BROMIDE AND ALBUTEROL SULFATE 2.5; .5 MG/3ML; MG/3ML
3 SOLUTION RESPIRATORY (INHALATION) EVERY 6 HOURS PRN
Status: DISCONTINUED | OUTPATIENT
Start: 2017-09-18 | End: 2017-09-20 | Stop reason: HOSPADM

## 2017-09-18 RX ORDER — PREDNISONE 10 MG/1
60 TABLET ORAL
Status: DISCONTINUED | OUTPATIENT
Start: 2017-09-19 | End: 2017-09-20 | Stop reason: HOSPADM

## 2017-09-18 RX ORDER — METHYLPREDNISOLONE ACETATE 80 MG/ML
80 INJECTION, SUSPENSION INTRA-ARTICULAR; INTRALESIONAL; INTRAMUSCULAR; SOFT TISSUE ONCE
Status: COMPLETED | OUTPATIENT
Start: 2017-09-18 | End: 2017-09-18

## 2017-09-18 RX ADMIN — LISINOPRIL 40 MG: 20 TABLET ORAL at 08:35

## 2017-09-18 RX ADMIN — BUDESONIDE 0.5 MG: 0.5 INHALANT RESPIRATORY (INHALATION) at 07:21

## 2017-09-18 RX ADMIN — HYDROCODONE BITARTRATE AND ACETAMINOPHEN 1 TABLET: 7.5; 325 TABLET ORAL at 01:24

## 2017-09-18 RX ADMIN — AMLODIPINE BESYLATE 10 MG: 5 TABLET ORAL at 08:35

## 2017-09-18 RX ADMIN — BUDESONIDE 0.5 MG: 0.5 INHALANT RESPIRATORY (INHALATION) at 19:46

## 2017-09-18 RX ADMIN — CLONIDINE HYDROCHLORIDE 0.2 MG: 0.2 TABLET ORAL at 21:05

## 2017-09-18 RX ADMIN — CLONIDINE HYDROCHLORIDE 0.2 MG: 0.2 TABLET ORAL at 08:35

## 2017-09-18 RX ADMIN — HYDROCHLOROTHIAZIDE 25 MG: 25 TABLET ORAL at 08:35

## 2017-09-18 RX ADMIN — IPRATROPIUM BROMIDE AND ALBUTEROL SULFATE 3 ML: .5; 3 SOLUTION RESPIRATORY (INHALATION) at 03:14

## 2017-09-18 RX ADMIN — ENOXAPARIN SODIUM 40 MG: 40 INJECTION SUBCUTANEOUS at 08:34

## 2017-09-18 RX ADMIN — HYDROCODONE BITARTRATE AND ACETAMINOPHEN 1 TABLET: 7.5; 325 TABLET ORAL at 08:35

## 2017-09-18 RX ADMIN — IPRATROPIUM BROMIDE AND ALBUTEROL SULFATE 3 ML: .5; 3 SOLUTION RESPIRATORY (INHALATION) at 07:21

## 2017-09-18 RX ADMIN — HYDROCODONE POLISTIREX AND CHLORPHENIRAMINE POLISTIREX 5 ML: 10; 8 SUSPENSION, EXTENDED RELEASE ORAL at 17:45

## 2017-09-18 RX ADMIN — METHYLPREDNISOLONE SODIUM SUCCINATE 40 MG: 40 INJECTION, POWDER, FOR SOLUTION INTRAMUSCULAR; INTRAVENOUS at 04:05

## 2017-09-18 RX ADMIN — CARVEDILOL 12.5 MG: 12.5 TABLET, FILM COATED ORAL at 08:34

## 2017-09-18 RX ADMIN — CARVEDILOL 12.5 MG: 12.5 TABLET, FILM COATED ORAL at 17:45

## 2017-09-18 RX ADMIN — IPRATROPIUM BROMIDE AND ALBUTEROL SULFATE 3 ML: .5; 3 SOLUTION RESPIRATORY (INHALATION) at 00:22

## 2017-09-18 RX ADMIN — HYDROCODONE BITARTRATE AND ACETAMINOPHEN 1 TABLET: 7.5; 325 TABLET ORAL at 17:45

## 2017-09-18 RX ADMIN — HYDROCODONE POLISTIREX AND CHLORPHENIRAMINE POLISTIREX 5 ML: 10; 8 SUSPENSION, EXTENDED RELEASE ORAL at 00:13

## 2017-09-18 RX ADMIN — HYDROCODONE POLISTIREX AND CHLORPHENIRAMINE POLISTIREX 5 ML: 10; 8 SUSPENSION, EXTENDED RELEASE ORAL at 08:35

## 2017-09-18 RX ADMIN — METHYLPREDNISOLONE ACETATE 80 MG: 80 INJECTION, SUSPENSION INTRA-ARTICULAR; INTRALESIONAL; INTRAMUSCULAR; SOFT TISSUE at 10:00

## 2017-09-18 NOTE — PLAN OF CARE
Problem: Patient Care Overview (Adult)  Goal: Adult Individualization and Mutuality    09/17/17 1411 09/18/17 0347   Individualization   Patient Specific Preferences likes to listen to music and keep his room cold --    Patient Specific Goals to go home --    Patient Specific Interventions --  monitor oxygen level; try to wean O2   Mutuality/Individual Preferences   What Anxieties, Fears or Concerns Do You Have About Your Health or Care? none --    What Questions Do You Have About Your Health or Care? none --    What Information Would Help Us Give You More Personalized Care? none --

## 2017-09-18 NOTE — PLAN OF CARE
Problem: Pneumonia (Adult)  Goal: Signs and Symptoms of Listed Potential Problems Will be Absent or Manageable (Pneumonia)  Outcome: Ongoing (interventions implemented as appropriate)  Goal: Signs and Symptoms of Listed Potential Problems Will be Absent or Manageable (Pneumonia)  Outcome: Ongoing (interventions implemented as appropriate)    Problem: Patient Care Overview (Adult)  Goal: Plan of Care Review  Outcome: Ongoing (interventions implemented as appropriate)  Goal: Adult Individualization and Mutuality  Outcome: Ongoing (interventions implemented as appropriate)

## 2017-09-18 NOTE — PROGRESS NOTES
[facility]    Name:  Tesfaye Ang   Age:  41 y.o.  Sex:  male  :  1975  MRN:  [id number]   Visit Number:  [id number]  Admission Date:  2017  Date Of Service:  17  Primary Care Physician:  Karlee Thomas MD     LOS: 8 days :  Patient Care Team:  Karlee Thomas MD as PCP - General (Family Medicine):    Chief Complaint:      SOA    Subjective / Interval History:     Patient with significant SOA and still requiring high amounts of O2, 10 LNC.  No current cp, n/v or abdominal pain. Dr. Oconnor pulmonary following.     I have reviewed labs/imaging/records from this hospitalization, including ER staff and admitting/attending physicians H/P's and progress notes to establish a comprehensive understanding of this patient's clinical hospital course, as well as to establish a transition of care appropriately.    Vital Signs:    Temp:  [97.2 °F (36.2 °C)-98.7 °F (37.1 °C)] 98.3 °F (36.8 °C)  Heart Rate:  [54-77] 59  Resp:  [16-18] 18  BP: (135-165)/(62-95) 154/95    Intake and output:    Intake/Output       17 0700 - 17 0659    Intake (ml) 360    Output (ml) 1700    Net (ml) -1340    Last Weight 200 lb 1.6 oz (90.8 kg)          Physical Examination:    General Appearance:  Alert and cooperative, not in any acute distress.   Head:  Atraumatic and normocephalic, without obvious abnormality.   Eyes:      PERRLA, Extra-occular movements are within normal limits.   Lungs:   Diffuse expiratory wheezing   Heart:  Normal S1 and S2, no murmur, no gallop, no rub.   Abdomen:   Normal bowel sounds,  Soft non-tender, non-distended, no guarding, no rebound tenderness   Extremities: No edema                     Laboratory results:      Results from last 7 days  Lab Units 17  0658 17  1123 17  0540   SODIUM mmol/L 138 137 137   POTASSIUM mmol/L 4.6 4.5 4.5   CHLORIDE mmol/L 101 99 100   CO2 mmol/L 31.0* 31.0* 30.0   BUN mg/dL 34*  35* 32*   CREATININE mg/dL 0.90 0.70 0.70   CALCIUM mg/dL 8.8 9.1 9.0   BILIRUBIN mg/dL  --  0.4 0.4   ALK PHOS U/L  --  40 42   ALT (SGPT) U/L  --  163* 144*   AST (SGOT) U/L  --  32 37   GLUCOSE mg/dL 107* 114* 126*       Results from last 7 days  Lab Units 09/18/17  0658 09/17/17  1123 09/16/17  0540   WBC 10*3/mm3 18.54* 17.21* 15.67*   HEMOGLOBIN g/dL 17.1 16.2 16.4   HEMATOCRIT % 52.4* 48.6 50.1   PLATELETS 10*3/mm3 200 208 205   MONOCYTES % %  --  5.0 4.0       Results from last 7 days  Lab Units 09/12/17  0607   TROPONIN I ng/mL <0.012           I have reviewed the patient's laboratory results.    Radiology results:    Imaging Results (last 24 hours)     ** No results found for the last 24 hours. **          I have reviewed the patient's radiology reports.    Medication Review:     I have reviewed the patients active and prn medications.       Assessment/Plan:  1.  Acute hypoxic respiratory failure, present on admission.  2.  Bilateral diffuse bacterial pneumonia, present on admission.  3.  Acute COPD exacerbation.  4.  Coronary artery disease on medical therapy.  5.  Essential hypertension.  6.  Seizure disorder  7. Left inguinal hernia        Plan:     Patient has completed course of IV Azithromycin/Ceftriaxone.  On Pulmicort and Prednisone. Continue to titrate down O2 accordingly.  Pulmonary, Dr. Oconnor following.  Outpatient surgery referral.       Jayesh Baltazar MD  09/18/17  2:29 PM

## 2017-09-18 NOTE — PROGRESS NOTES
"  CC: Acute Respiratory Failure. PNA.     S: Continues to have dry cough. Feels that the shortness of breath has definitely improved.    O:Vital signs reviewed. O2Sat: 93% on 8-12 LPM.  /95  Pulse 59  Temp 98.7 °F (37.1 °C) (Oral)   Resp 16  Ht 66\" (167.6 cm)  Wt 200 lb 1.6 oz (90.8 kg)  SpO2 98%  BMI 32.3 kg/m2      I & Os reviewed.   Intake/Output       09/17/17 0700 - 09/18/17 0659    Intake (ml) 360    Output (ml) 1700    Net (ml) -1340    Last Weight 200 lb 1.6 oz (90.8 kg)          General: No acute distress noted.  Eyes: PERRL. EOM intact.  Cardiovascular: S1 + S2. Regular.   Respiratory: Minimal respiratory distress noted. No wheezing heard. No significant crackles noted.  Extremities: No edema noted.  Neurologic: AAOx3. Was able to follow commands.    Skin: Appeared somewhat dry     Labs: Reviewed.       Results from last 7 days  Lab Units 09/18/17  0658 09/17/17  1123 09/16/17  0540   SODIUM mmol/L 138 137 137   POTASSIUM mmol/L 4.6 4.5 4.5   CHLORIDE mmol/L 101 99 100   CO2 mmol/L 31.0* 31.0* 30.0   BUN mg/dL 34* 35* 32*   CREATININE mg/dL 0.90 0.70 0.70   CALCIUM mg/dL 8.8 9.1 9.0   BILIRUBIN mg/dL  --  0.4 0.4   ALK PHOS U/L  --  40 42   ALT (SGPT) U/L  --  163* 144*   AST (SGOT) U/L  --  32 37   GLUCOSE mg/dL 107* 114* 126*         Results from last 7 days  Lab Units 09/18/17  0658 09/17/17  1123 09/16/17  0540 09/15/17  0607 09/14/17  0408   WBC 10*3/mm3 18.54* 17.21* 15.67* 13.74* 12.62*   HEMOGLOBIN g/dL 17.1 16.2 16.4 16.2 15.5   PLATELETS 10*3/mm3 200 208 205 192 177       Assessment & Recommendations/Plan:   1. Acute Hypoxic Respiratory Failure.    - Still requiring high dose of O2.  - Will taper O2.    2. Likely PNA.  -  Strep and Legionella antigen negative.    3. Abnormal CT.    4. Smoking.   - Advised to quit.    5. Leukocytosis.  - Likely steroid mediated?  - Continues to have no fever. Chest X Ray improving.  - Will D/C Ceftriaxone today.  - Will watch him clinically    He " seems to be doing fairly well with a nasal cannula at about 10 L/m and his oxygen levels are staying around 93% or so.    We have reviewed patient's current orders and changes, if any, have been suggested to primary care team. Plan was also discussed with nursing staff, as necessary.     Elvis Oconnor MD  09/18/17  8:49 AM    Dictated utilizing Dragon dictation.

## 2017-09-18 NOTE — PLAN OF CARE
Problem: Pneumonia (Adult)  Goal: Signs and Symptoms of Listed Potential Problems Will be Absent or Manageable (Pneumonia)  Outcome: Ongoing (interventions implemented as appropriate)    09/18/17 0347   Pneumonia   Problems Assessed (Pneumonia) all   Problems Present (Pneumonia) respiratory compromise  (requires 15L high flow oxygen)

## 2017-09-18 NOTE — PROGRESS NOTES
Discussed discharge plans with patient,  States either Tue or Wed.  States Dr told him he would probably go home with oxygen.  Pt does not have current DME.  List provided.  Pt states [employer] ok for oxygen.Continued Stay Note  JOSE DAVID Richmond     Patient Name: Tesfaye Ang  MRN: [id number]  Today's Date: 9/18/2017    Admit Date: 9/9/2017          Discharge Plan     None              Discharge Codes     None        Expected Discharge Date and Time     Expected Discharge Date Expected Discharge Time    Sep 19, 2017             Jackelin Parks RN

## 2017-09-19 ENCOUNTER — TELEPHONE (OUTPATIENT)
Dept: PULMONOLOGY | Facility: CLINIC | Age: 42
End: 2017-09-19

## 2017-09-19 LAB
ANION GAP SERPL CALCULATED.3IONS-SCNC: 9.7 MMOL/L
BUN BLD-MCNC: 32 MG/DL (ref 7–20)
BUN/CREAT SERPL: 40 (ref 6.3–21.9)
CALCIUM SPEC-SCNC: 8.5 MG/DL (ref 8.4–10.2)
CHLORIDE SERPL-SCNC: 100 MMOL/L (ref 98–107)
CO2 SERPL-SCNC: 32 MMOL/L (ref 26–30)
CREAT BLD-MCNC: 0.8 MG/DL (ref 0.6–1.3)
DEPRECATED RDW RBC AUTO: 45.1 FL (ref 37–54)
ERYTHROCYTE [DISTWIDTH] IN BLOOD BY AUTOMATED COUNT: 14.5 % (ref 11.5–14.5)
GFR SERPL CREATININE-BSD FRML MDRD: 107 ML/MIN/1.73
GLUCOSE BLD-MCNC: 79 MG/DL (ref 74–98)
HBV CORE AB SER DONR QL IA: NEGATIVE
HBV CORE IGM SERPL QL IA: NEGATIVE
HBV E AB SERPL QL IA: NEGATIVE
HBV E AG SERPL QL IA: NEGATIVE
HBV SURFACE AB SER QL: NON REACTIVE
HBV SURFACE AG SERPL QL IA: NEGATIVE
HCT VFR BLD AUTO: 51.2 % (ref 42–52)
HCV AB S/CO SERPL IA: 0.3 S/CO RATIO (ref 0–0.9)
HGB BLD-MCNC: 16.5 G/DL (ref 14–18)
LABORATORY COMMENT REPORT: NORMAL
LYMPHOCYTES # BLD MANUAL: 2.83 10*3/MM3 (ref 0.6–3.4)
LYMPHOCYTES NFR BLD MANUAL: 18 % (ref 10–50)
LYMPHOCYTES NFR BLD MANUAL: 5 % (ref 0–12)
MCH RBC QN AUTO: 27.8 PG (ref 27–31)
MCHC RBC AUTO-ENTMCNC: 32.2 G/DL (ref 30–37)
MCV RBC AUTO: 86.3 FL (ref 80–94)
MONOCYTES # BLD AUTO: 0.79 10*3/MM3 (ref 0–0.9)
NEUTROPHILS # BLD AUTO: 12.09 10*3/MM3 (ref 2–6.9)
NEUTROPHILS NFR BLD MANUAL: 73 % (ref 37–80)
NEUTS BAND NFR BLD MANUAL: 4 % (ref 0–6)
PLATELET # BLD AUTO: 177 10*3/MM3 (ref 130–400)
PMV BLD AUTO: 10.2 FL (ref 6–12)
POTASSIUM BLD-SCNC: 4.7 MMOL/L (ref 3.5–5.1)
RBC # BLD AUTO: 5.93 10*6/MM3 (ref 4.7–6.1)
RBC MORPH BLD: NORMAL
SCAN SLIDE: NORMAL
SMALL PLATELETS BLD QL SMEAR: ADEQUATE
SODIUM BLD-SCNC: 137 MMOL/L (ref 137–145)
WBC MORPH BLD: NORMAL
WBC NRBC COR # BLD: 15.7 10*3/MM3 (ref 4.8–10.8)

## 2017-09-19 PROCEDURE — 99232 SBSQ HOSP IP/OBS MODERATE 35: CPT | Performed by: INTERNAL MEDICINE

## 2017-09-19 PROCEDURE — 25010000002 ENOXAPARIN PER 10 MG: Performed by: INTERNAL MEDICINE

## 2017-09-19 PROCEDURE — 94799 UNLISTED PULMONARY SVC/PX: CPT

## 2017-09-19 PROCEDURE — 94620 HC PULMONARY STRESS TEST SIMPLE: CPT

## 2017-09-19 PROCEDURE — 85025 COMPLETE CBC W/AUTO DIFF WBC: CPT | Performed by: INTERNAL MEDICINE

## 2017-09-19 PROCEDURE — 85007 BL SMEAR W/DIFF WBC COUNT: CPT | Performed by: INTERNAL MEDICINE

## 2017-09-19 PROCEDURE — 80048 BASIC METABOLIC PNL TOTAL CA: CPT | Performed by: INTERNAL MEDICINE

## 2017-09-19 PROCEDURE — 63710000001 PREDNISONE PER 5 MG: Performed by: INTERNAL MEDICINE

## 2017-09-19 RX ADMIN — CLONIDINE HYDROCHLORIDE 0.2 MG: 0.2 TABLET ORAL at 09:17

## 2017-09-19 RX ADMIN — CARVEDILOL 12.5 MG: 12.5 TABLET, FILM COATED ORAL at 09:16

## 2017-09-19 RX ADMIN — HYDROCODONE POLISTIREX AND CHLORPHENIRAMINE POLISTIREX 5 ML: 10; 8 SUSPENSION, EXTENDED RELEASE ORAL at 01:14

## 2017-09-19 RX ADMIN — IPRATROPIUM BROMIDE AND ALBUTEROL SULFATE 3 ML: .5; 3 SOLUTION RESPIRATORY (INHALATION) at 19:32

## 2017-09-19 RX ADMIN — AMLODIPINE BESYLATE 10 MG: 5 TABLET ORAL at 09:17

## 2017-09-19 RX ADMIN — HYDROCODONE POLISTIREX AND CHLORPHENIRAMINE POLISTIREX 5 ML: 10; 8 SUSPENSION, EXTENDED RELEASE ORAL at 09:15

## 2017-09-19 RX ADMIN — HYDROCODONE BITARTRATE AND ACETAMINOPHEN 1 TABLET: 7.5; 325 TABLET ORAL at 01:14

## 2017-09-19 RX ADMIN — ENOXAPARIN SODIUM 40 MG: 40 INJECTION SUBCUTANEOUS at 09:17

## 2017-09-19 RX ADMIN — HYDROCODONE POLISTIREX AND CHLORPHENIRAMINE POLISTIREX 5 ML: 10; 8 SUSPENSION, EXTENDED RELEASE ORAL at 17:19

## 2017-09-19 RX ADMIN — LISINOPRIL 40 MG: 20 TABLET ORAL at 09:17

## 2017-09-19 RX ADMIN — BUDESONIDE 0.5 MG: 0.5 INHALANT RESPIRATORY (INHALATION) at 07:26

## 2017-09-19 RX ADMIN — HYDROCODONE BITARTRATE AND ACETAMINOPHEN 1 TABLET: 7.5; 325 TABLET ORAL at 17:18

## 2017-09-19 RX ADMIN — HYDROCODONE BITARTRATE AND ACETAMINOPHEN 1 TABLET: 7.5; 325 TABLET ORAL at 09:15

## 2017-09-19 RX ADMIN — BUDESONIDE 0.5 MG: 0.5 INHALANT RESPIRATORY (INHALATION) at 19:32

## 2017-09-19 RX ADMIN — CLONIDINE HYDROCHLORIDE 0.2 MG: 0.2 TABLET ORAL at 20:21

## 2017-09-19 RX ADMIN — PREDNISONE 60 MG: 10 TABLET ORAL at 09:16

## 2017-09-19 RX ADMIN — IPRATROPIUM BROMIDE AND ALBUTEROL SULFATE 3 ML: .5; 3 SOLUTION RESPIRATORY (INHALATION) at 07:26

## 2017-09-19 RX ADMIN — CARVEDILOL 12.5 MG: 12.5 TABLET, FILM COATED ORAL at 17:18

## 2017-09-19 RX ADMIN — HYDROCHLOROTHIAZIDE 25 MG: 25 TABLET ORAL at 09:17

## 2017-09-19 NOTE — PROGRESS NOTES
[facility]    Name:  Tesfaye Ang   Age:  41 y.o.  Sex:  male  :  1975  MRN:  [id number]   Visit Number:  [id number]  Admission Date:  2017  Date Of Service:  17  Primary Care Physician:  Karlee Thomas MD     LOS: 9 days :  Patient Care Team:  Karlee Thomas MD as PCP - General (Family Medicine):    Chief Complaint:      SOA    Subjective / Interval History:     Patients SOA overall is improving.  Continue titrate down Oxygen accordingly.  No current cp, n/v, or abdominal pain.     I have reviewed labs/imaging/records from this hospitalization, including ER staff and admitting/attending physicians H/P's and progress notes to establish a comprehensive understanding of this patient's clinical hospital course, as well as to establish a transition of care appropriately.    Vital Signs:    Temp:  [97.7 °F (36.5 °C)-98.2 °F (36.8 °C)] 98.1 °F (36.7 °C)  Heart Rate:  [54-65] 58  Resp:  [16-20] 16  BP: (134-145)/(83-99) 139/99    Intake and output:    Intake/Output     None          Physical Examination:    General Appearance:  Alert and cooperative, not in any acute distress.   Head:  Atraumatic and normocephalic, without obvious abnormality.   Eyes:      PERRLA, Extra-occular movements are within normal limits.   Lungs:   Chest shape is normal. Breath sounds heard bilaterally equally.  No crackles or wheezing.   Heart:  Normal S1 and S2, no murmur, no gallop, no rub.   Abdomen:   Normal bowel sounds,  Soft non-tender, non-distended, no guarding, no rebound tenderness   Extremities: No edema                     Laboratory results:      Results from last 7 days  Lab Units 17  0713 17  0658 17  1123 17  0540   SODIUM mmol/L 137 138 137 137   POTASSIUM mmol/L 4.7 4.6 4.5 4.5   CHLORIDE mmol/L 100 101 99 100   CO2 mmol/L 32.0* 31.0* 31.0* 30.0   BUN mg/dL 32* 34* 35* 32*   CREATININE mg/dL 0.80 0.90 0.70 0.70    CALCIUM mg/dL 8.5 8.8 9.1 9.0   BILIRUBIN mg/dL  --   --  0.4 0.4   ALK PHOS U/L  --   --  40 42   ALT (SGPT) U/L  --   --  163* 144*   AST (SGOT) U/L  --   --  32 37   GLUCOSE mg/dL 79 107* 114* 126*       Results from last 7 days  Lab Units 09/19/17  0713 09/18/17  0658 09/17/17  1123 09/16/17  0540   WBC 10*3/mm3 15.70* 18.54* 17.21* 15.67*   HEMOGLOBIN g/dL 16.5 17.1 16.2 16.4   HEMATOCRIT % 51.2 52.4* 48.6 50.1   PLATELETS 10*3/mm3 177 200 208 205   MONOCYTES % % 5.0  --  5.0 4.0               I have reviewed the patient's laboratory results.    Radiology results:    Imaging Results (last 24 hours)     ** No results found for the last 24 hours. **          I have reviewed the patient's radiology reports.    Medication Review:     I have reviewed the patients active and prn medications.       Assessment/Plan:  1.  Acute hypoxic respiratory failure, present on admission.  2.  Bilateral diffuse bacterial pneumonia, present on admission.  3.  Acute COPD exacerbation.  4.  Coronary artery disease on medical therapy.  5.  Essential hypertension.  6.  Seizure disorder  7. Left inguinal hernia          Plan:      Patient has completed course of IV Azithromycin/Ceftriaxone.  On Pulmicort and Prednisone. Continue to titrate down O2 accordingly.  Pulmonary, Dr. Oconnor following.  Possibly DC in 1-2 days.  Will benefit from outpatient surgery referral.           Jayesh Baltazar MD  09/19/17  4:27 PM

## 2017-09-19 NOTE — PLAN OF CARE
Problem: Fall Risk (Adult)  Goal: Identify Related Risk Factors and Signs and Symptoms  Outcome: Ongoing (interventions implemented as appropriate)    09/09/17 5759   Fall Risk   Fall Risk: Related Risk Factors gait/mobility problems;history of falls   Fall Risk: Signs and Symptoms presence of risk factors

## 2017-09-19 NOTE — PROGRESS NOTES
Exercise Oximetry    Patient Name:Tesfaye Ang   MRN: [id number]   Date: 09/19/17             ROOM AIR BASELINE   SpO2% 87   Heart Rate 72   Blood Pressure      EXERCISE ON ROOM AIR SpO2% EXERCISE ON O2 @ 2 LPM SpO2%   1 MINUTE  1 MINUTE 93   2 MINUTES  2 MINUTES 92   3 MINUTES  3 MINUTES 91   4 MINUTES  4 MINUTES 92   5 MINUTES  5 MINUTES 93   6 MINUTES  6 MINUTES 92              Distance Walked   Distance Walked    150 FT   Dyspnea (Pete Scale)   Dyspnea (Pete Scale)   3   Fatigue (Pete Scale)   Fatigue (Pete Scale)   SpO2% Post Exercise   SpO2% Post Exercise  94% on 2 lpm NC   HR Post Exercise   HR Post Exercise  75   Time to Recovery   Time to Recovery  5 Minutes     Comments: Pt walked hallway at own pace with no assistance needed.

## 2017-09-19 NOTE — PROGRESS NOTES
"  CC: Acute Respiratory Failure. PNA.     S: Continues to have dry cough. Feels that the shortness of breath has definitely improved.     O:Vital signs reviewed. O2Sat: 93% on 4-6 LPM.  /99 (BP Location: Left arm, Patient Position: Lying)  Pulse 58  Temp 98.1 °F (36.7 °C) (Oral)   Resp 16  Ht 66\" (167.6 cm)  Wt 200 lb 1.6 oz (90.8 kg)  SpO2 98%  BMI 32.3 kg/m2    General: No acute distress noted.  Cardiovascular: S1 + S2. Regular.   Respiratory: Minimal respiratory distress noted. No wheezing heard. No significant crackles noted.  Extremities: No edema noted.  Neurologic: AAOx3. Was able to follow commands.      Labs: Reviewed.       Results from last 7 days  Lab Units 09/19/17  0713 09/18/17  0658 09/17/17  1123 09/16/17  0540   SODIUM mmol/L 137 138 137 137   POTASSIUM mmol/L 4.7 4.6 4.5 4.5   CHLORIDE mmol/L 100 101 99 100   CO2 mmol/L 32.0* 31.0* 31.0* 30.0   BUN mg/dL 32* 34* 35* 32*   CREATININE mg/dL 0.80 0.90 0.70 0.70   CALCIUM mg/dL 8.5 8.8 9.1 9.0   BILIRUBIN mg/dL  --   --  0.4 0.4   ALK PHOS U/L  --   --  40 42   ALT (SGPT) U/L  --   --  163* 144*   AST (SGOT) U/L  --   --  32 37   GLUCOSE mg/dL 79 107* 114* 126*         Results from last 7 days  Lab Units 09/19/17  0713 09/18/17  0658 09/17/17  1123 09/16/17  0540 09/15/17  0607   WBC 10*3/mm3 15.70* 18.54* 17.21* 15.67* 13.74*   HEMOGLOBIN g/dL 16.5 17.1 16.2 16.4 16.2   PLATELETS 10*3/mm3 177 200 208 205 192       Assessment & Recommendations/Plan:   1. Acute Hypoxic Respiratory Failure.    - Continue to taper O2.    2. Likely PNA.  -  Strep and Legionella antigen negative.    3. Abnormal CT.    4. Smoking.   - Advised to quit.    5. Leukocytosis.  - Likely steroid mediated?  - Continues to have no fever. Chest X Ray improving.  - D/Michele Ceftriaxone.    He seems to be doing fairly well with a nasal cannula at about 5-6 L/m and his oxygen levels are staying around 93% or so. If he can ambulate and doesn't require more than 5-6 LPM oxygen " to maintain Oxygen saturation at > 90%, then he can be possibly discharged home.     He will need to see us in office in 3-4 weeks with a repeat chest x ray.    We have reviewed patient's current orders and changes, if any, have been suggested to primary care team. Plan was also discussed with nursing staff, as necessary.     Elvis Oconnor MD  09/19/17  10:53 AM    Dictated utilizing Dragon dictation.

## 2017-09-20 ENCOUNTER — OFFICE VISIT (OUTPATIENT)
Dept: SURGERY | Facility: CLINIC | Age: 42
End: 2017-09-20

## 2017-09-20 VITALS
HEIGHT: 66 IN | OXYGEN SATURATION: 99 % | BODY MASS INDEX: 32.14 KG/M2 | TEMPERATURE: 97.8 F | WEIGHT: 200 LBS | SYSTOLIC BLOOD PRESSURE: 138 MMHG | DIASTOLIC BLOOD PRESSURE: 84 MMHG | HEART RATE: 80 BPM

## 2017-09-20 VITALS
HEART RATE: 72 BPM | HEIGHT: 66 IN | OXYGEN SATURATION: 94 % | BODY MASS INDEX: 32.16 KG/M2 | RESPIRATION RATE: 18 BRPM | TEMPERATURE: 97.9 F | WEIGHT: 200.1 LBS | DIASTOLIC BLOOD PRESSURE: 77 MMHG | SYSTOLIC BLOOD PRESSURE: 131 MMHG

## 2017-09-20 DIAGNOSIS — R10.32 LEFT LOWER QUADRANT PAIN: Primary | ICD-10-CM

## 2017-09-20 DIAGNOSIS — J18.9 PNEUMONIA OF BOTH LUNGS DUE TO INFECTIOUS ORGANISM, UNSPECIFIED PART OF LUNG: ICD-10-CM

## 2017-09-20 LAB
ANION GAP SERPL CALCULATED.3IONS-SCNC: 8.5 MMOL/L
BUN BLD-MCNC: 33 MG/DL (ref 7–20)
BUN/CREAT SERPL: 41.3 (ref 6.3–21.9)
CALCIUM SPEC-SCNC: 8.9 MG/DL (ref 8.4–10.2)
CHLORIDE SERPL-SCNC: 99 MMOL/L (ref 98–107)
CO2 SERPL-SCNC: 33 MMOL/L (ref 26–30)
CREAT BLD-MCNC: 0.8 MG/DL (ref 0.6–1.3)
DEPRECATED RDW RBC AUTO: 45 FL (ref 37–54)
ERYTHROCYTE [DISTWIDTH] IN BLOOD BY AUTOMATED COUNT: 14.5 % (ref 11.5–14.5)
GFR SERPL CREATININE-BSD FRML MDRD: 107 ML/MIN/1.73
GLUCOSE BLD-MCNC: 87 MG/DL (ref 74–98)
HCT VFR BLD AUTO: 51.2 % (ref 42–52)
HGB BLD-MCNC: 16.5 G/DL (ref 14–18)
LYMPHOCYTES # BLD MANUAL: 1.34 10*3/MM3 (ref 0.6–3.4)
LYMPHOCYTES NFR BLD MANUAL: 6 % (ref 0–12)
LYMPHOCYTES NFR BLD MANUAL: 8 % (ref 10–50)
MCH RBC QN AUTO: 27.7 PG (ref 27–31)
MCHC RBC AUTO-ENTMCNC: 32.2 G/DL (ref 30–37)
MCV RBC AUTO: 86.1 FL (ref 80–94)
METAMYELOCYTES NFR BLD MANUAL: 3 % (ref 0–0)
MONOCYTES # BLD AUTO: 1 10*3/MM3 (ref 0–0.9)
MYELOCYTES NFR BLD MANUAL: 1 % (ref 0–0)
NEUTROPHILS # BLD AUTO: 13.69 10*3/MM3 (ref 2–6.9)
NEUTROPHILS NFR BLD MANUAL: 79 % (ref 37–80)
NEUTS BAND NFR BLD MANUAL: 3 % (ref 0–6)
PLATELET # BLD AUTO: 178 10*3/MM3 (ref 130–400)
PMV BLD AUTO: 10.1 FL (ref 6–12)
POTASSIUM BLD-SCNC: 4.5 MMOL/L (ref 3.5–5.1)
RBC # BLD AUTO: 5.95 10*6/MM3 (ref 4.7–6.1)
RBC MORPH BLD: NORMAL
SCAN SLIDE: NORMAL
SMALL PLATELETS BLD QL SMEAR: ADEQUATE
SODIUM BLD-SCNC: 136 MMOL/L (ref 137–145)
WBC MORPH BLD: NORMAL
WBC NRBC COR # BLD: 16.69 10*3/MM3 (ref 4.8–10.8)

## 2017-09-20 PROCEDURE — 94799 UNLISTED PULMONARY SVC/PX: CPT

## 2017-09-20 PROCEDURE — 63710000001 PREDNISONE PER 5 MG: Performed by: INTERNAL MEDICINE

## 2017-09-20 PROCEDURE — 80048 BASIC METABOLIC PNL TOTAL CA: CPT | Performed by: INTERNAL MEDICINE

## 2017-09-20 PROCEDURE — 85025 COMPLETE CBC W/AUTO DIFF WBC: CPT | Performed by: INTERNAL MEDICINE

## 2017-09-20 PROCEDURE — 85007 BL SMEAR W/DIFF WBC COUNT: CPT | Performed by: INTERNAL MEDICINE

## 2017-09-20 PROCEDURE — 25010000002 ENOXAPARIN PER 10 MG: Performed by: INTERNAL MEDICINE

## 2017-09-20 PROCEDURE — 99239 HOSP IP/OBS DSCHRG MGMT >30: CPT | Performed by: INTERNAL MEDICINE

## 2017-09-20 PROCEDURE — 99204 OFFICE O/P NEW MOD 45 MIN: CPT | Performed by: SURGERY

## 2017-09-20 RX ORDER — ALBUTEROL SULFATE 90 UG/1
2 AEROSOL, METERED RESPIRATORY (INHALATION) EVERY 4 HOURS PRN
Qty: 6.7 G | Refills: 0 | Status: ON HOLD | OUTPATIENT
Start: 2017-09-20 | End: 2018-10-15

## 2017-09-20 RX ORDER — LISINOPRIL 40 MG/1
40 TABLET ORAL DAILY
Qty: 30 TABLET | Refills: 0 | Status: SHIPPED | OUTPATIENT
Start: 2017-09-21 | End: 2018-05-30 | Stop reason: DRUGHIGH

## 2017-09-20 RX ORDER — HYDROCODONE BITARTRATE AND ACETAMINOPHEN 7.5; 325 MG/1; MG/1
TABLET ORAL
Refills: 0 | COMMUNITY
Start: 2017-09-05 | End: 2018-04-23

## 2017-09-20 RX ORDER — ATORVASTATIN CALCIUM 20 MG/1
20 TABLET, FILM COATED ORAL DAILY
Qty: 30 TABLET | Refills: 0 | Status: SHIPPED | OUTPATIENT
Start: 2017-09-20 | End: 2017-10-02

## 2017-09-20 RX ORDER — PREDNISONE 1 MG/1
5 TABLET ORAL TAKE AS DIRECTED
Qty: 63 TABLET | Refills: 0 | Status: SHIPPED | OUTPATIENT
Start: 2017-09-20 | End: 2017-10-02

## 2017-09-20 RX ORDER — CARVEDILOL 12.5 MG/1
12.5 TABLET ORAL 2 TIMES DAILY WITH MEALS
Qty: 60 TABLET | Refills: 0 | Status: SHIPPED | OUTPATIENT
Start: 2017-09-20 | End: 2018-05-30 | Stop reason: DRUGHIGH

## 2017-09-20 RX ORDER — BUDESONIDE 0.5 MG/2ML
0.5 INHALANT ORAL
Qty: 120 ML | Refills: 0 | Status: SHIPPED | OUTPATIENT
Start: 2017-09-20 | End: 2017-09-20 | Stop reason: HOSPADM

## 2017-09-20 RX ORDER — CLONIDINE HYDROCHLORIDE 0.2 MG/1
0.2 TABLET ORAL EVERY 12 HOURS SCHEDULED
Qty: 60 TABLET | Refills: 0 | Status: SHIPPED | OUTPATIENT
Start: 2017-09-20 | End: 2018-06-19

## 2017-09-20 RX ORDER — METHYLPREDNISOLONE 4 MG/1
4 TABLET ORAL
COMMUNITY
Start: 2010-12-08 | End: 2017-10-02

## 2017-09-20 RX ORDER — CYCLOBENZAPRINE HCL 10 MG
10 TABLET ORAL
COMMUNITY
Start: 2010-12-08 | End: 2017-10-02 | Stop reason: HOSPADM

## 2017-09-20 RX ADMIN — HYDROCHLOROTHIAZIDE 25 MG: 25 TABLET ORAL at 08:43

## 2017-09-20 RX ADMIN — LISINOPRIL 40 MG: 20 TABLET ORAL at 08:43

## 2017-09-20 RX ADMIN — CARVEDILOL 12.5 MG: 12.5 TABLET, FILM COATED ORAL at 08:43

## 2017-09-20 RX ADMIN — HYDROCODONE POLISTIREX AND CHLORPHENIRAMINE POLISTIREX 5 ML: 10; 8 SUSPENSION, EXTENDED RELEASE ORAL at 10:16

## 2017-09-20 RX ADMIN — HYDROCODONE BITARTRATE AND ACETAMINOPHEN 1 TABLET: 7.5; 325 TABLET ORAL at 10:16

## 2017-09-20 RX ADMIN — AMLODIPINE BESYLATE 10 MG: 5 TABLET ORAL at 08:43

## 2017-09-20 RX ADMIN — HYDROCODONE BITARTRATE AND ACETAMINOPHEN 1 TABLET: 7.5; 325 TABLET ORAL at 01:39

## 2017-09-20 RX ADMIN — BUDESONIDE 0.5 MG: 0.5 INHALANT RESPIRATORY (INHALATION) at 07:58

## 2017-09-20 RX ADMIN — ENOXAPARIN SODIUM 40 MG: 40 INJECTION SUBCUTANEOUS at 08:42

## 2017-09-20 RX ADMIN — CLONIDINE HYDROCHLORIDE 0.2 MG: 0.2 TABLET ORAL at 09:33

## 2017-09-20 RX ADMIN — PREDNISONE 60 MG: 10 TABLET ORAL at 08:43

## 2017-09-20 RX ADMIN — HYDROCODONE POLISTIREX AND CHLORPHENIRAMINE POLISTIREX 5 ML: 10; 8 SUSPENSION, EXTENDED RELEASE ORAL at 01:39

## 2017-09-20 NOTE — PROGRESS NOTES
Patient: Tesfaye Ang    YOB: 1975    Date: 09/20/2017    Primary Care Provider: Karlee Thomas MD    Reason for Consultation: LLQ pain    Chief complaint: left inguinal pain    Subjective .     History of present illness:  I saw the patient in the office today as a consultation for evaluation and treatment of a possible LIH. Patient has recently been discharged from  for double pneumonia. He complains of pain @ left groin with a slight bulge for a couple of weeks. Mr. Ang states the pain is worse with coughing. Patient has experienced 4 heart attacks since he was 29. Apparently the patient has been in the hospital for the last 10 days with bilateral pneumonia, he is currently on antibiotics and oxygen.  He has had a previous left inguinal hernia repair performed in [place] several years ago.    Review of Systems   Constitutional: Negative for chills, fever and unexpected weight change.   HENT: Negative for trouble swallowing and voice change.    Eyes: Negative for visual disturbance.   Respiratory: Negative for apnea, cough, chest tightness, shortness of breath and wheezing.    Cardiovascular: Negative for chest pain, palpitations and leg swelling.   Gastrointestinal: Negative for abdominal distention, abdominal pain, anal bleeding, blood in stool, constipation, diarrhea, nausea, rectal pain and vomiting.   Endocrine: Negative for cold intolerance and heat intolerance.   Genitourinary: Negative for difficulty urinating, dysuria, flank pain, scrotal swelling and testicular pain.   Musculoskeletal: Negative for back pain, gait problem and joint swelling.   Skin: Negative for color change, rash and wound.   Neurological: Negative for dizziness, syncope, speech difficulty, weakness, numbness and headaches.   Hematological: Negative for adenopathy. Does not bruise/bleed easily.   Psychiatric/Behavioral: Negative for confusion. The patient is not nervous/anxious.        History:  Past  Medical History:   Diagnosis Date   • Arthritis    • CHF (congestive heart failure)    • Colon polyp    • COPD (chronic obstructive pulmonary disease)    • Fractures    • Heart attack    • Heart disease    • Heart failure    • History of bilateral inguinal hernias    • Hx of transfusion of whole blood    • Hypertension    • Kidney stone    • Myocardial infarction    • Obesity    • Seizures    • Stroke     TIA       Past Surgical History:   Procedure Laterality Date   • APPENDECTOMY  05/2012   • CHOLECYSTECTOMY  12/2012   • COLON RESECTION     • HERNIA REPAIR  04/2016   • REVISION / TAKEDOWN COLOSTOMY         Family History   Problem Relation Age of Onset   • Heart attack Mother 56   • Hypertension Mother    • Migraines Mother    • Stroke Mother    • Heart attack Father 39   • Hypertension Father    • Arthritis Paternal Grandmother    • Arthritis Paternal Grandfather        Social History   Substance Use Topics   • Smoking status: Current Every Day Smoker     Packs/day: 0.50     Types: Cigarettes   • Smokeless tobacco: Never Used   • Alcohol use No      Comment: STOPPED 2001       Allergies:  Allergies   Allergen Reactions   • Compazine [Prochlorperazine Edisylate] Anaphylaxis and Shortness Of Breath   • Gabapentin Other (See Comments)     seizures   • Toradol [Ketorolac Tromethamine] Hives and Shortness Of Breath   • Darvon [Propoxyphene] Other (See Comments) and Hives   • Vioxx [Rofecoxib] Other (See Comments)     Stomach bleeding  Stomach bleeding   • Butalbital-Aspirin-Caffeine Hives       Medications:    Current Outpatient Prescriptions:   •  acetaminophen (TYLENOL 8 HOUR) 650 MG 8 hr tablet, Take 1 tablet by mouth Every 8 (Eight) Hours As Needed for mild pain (1-3)., Disp: 12 tablet, Rfl: 0  •  albuterol (PROVENTIL HFA;VENTOLIN HFA) 108 (90 Base) MCG/ACT inhaler, Inhale 2 puffs Every 4 (Four) Hours As Needed for Wheezing or Shortness of Air., Disp: 6.7 g, Rfl: 0  •  amLODIPine (NORVASC) 10 MG tablet, Take 1  tablet by mouth Daily., Disp: 30 tablet, Rfl: 11  •  atorvastatin (LIPITOR) 20 MG tablet, Take 1 tablet by mouth Daily., Disp: 30 tablet, Rfl: 0  •  carvedilol (COREG) 12.5 MG tablet, Take 1 tablet by mouth 2 (Two) Times a Day With Meals., Disp: 60 tablet, Rfl: 0  •  CloNIDine (CATAPRES) 0.2 MG tablet, Take 1 tablet by mouth Every 12 (Twelve) Hours., Disp: 60 tablet, Rfl: 0  •  cyclobenzaprine (FLEXERIL) 10 MG tablet, Take 10 mg by mouth., Disp: , Rfl:   •  cyclobenzaprine (FLEXERIL) 5 MG tablet, Take 1 tablet by mouth At Night As Needed for muscle spasms., Disp: 7 tablet, Rfl: 0  •  ENALAPRIL MALEATE PO, Take  by mouth., Disp: , Rfl:   •  hydrochlorothiazide (HYDRODIURIL) 25 MG tablet, Take 25 mg by mouth Daily., Disp: , Rfl:   •  HYDROcodone-acetaminophen (NORCO) 7.5-325 MG per tablet, TAKE 1 TABLET BY MOUTH 3 TIMES A DAY AS NEEDED, Disp: , Rfl: 0  •  HYDROcodone-acetaminophen (VICODIN) 5-500 MG per tablet, Take  by mouth., Disp: , Rfl:   •  [START ON 9/21/2017] lisinopril (PRINIVIL,ZESTRIL) 40 MG tablet, Take 1 tablet by mouth Daily., Disp: 30 tablet, Rfl: 0  •  MethylPREDNISolone (MEDROL, KAVITA,) 4 MG tablet, Take 4 mg by mouth., Disp: , Rfl:   •  mupirocin (BACTROBAN) 2 % ointment, Apply  topically 3 (Three) Times a Day., Disp: 22 g, Rfl: 0  •  predniSONE (DELTASONE) 5 MG tablet, Take 1 tablet by mouth Take As Directed for 1 dose., Disp: 63 tablet, Rfl: 0  •  VERAPAMIL HCL PO, Take  by mouth., Disp: , Rfl:   No current facility-administered medications for this visit.     Objective     Vital Signs:   Temp:  [97.4 °F (36.3 °C)-98 °F (36.7 °C)] 97.8 °F (36.6 °C)  Heart Rate:  [57-86] 80  Resp:  [12-18] 18  BP: (111-150)/(68-89) 138/84    Physical Exam:   General Appearance:    Alert, cooperative, in no acute distress   Head:    Normocephalic, without obvious abnormality, atraumatic   Eyes:            Lids and lashes normal, conjunctivae and sclerae normal, no   icterus, no pallor, corneas clear, PERRLA   Ears:     Ears appear intact with no abnormalities noted   Throat:   No oral lesions, no thrush, oral mucosa moist   Neck:   No adenopathy, supple, trachea midline, no thyromegaly, no   carotid bruit, no JVD   Lungs:     Clear to auscultation,respirations regular, even and                  unlabored    Heart:    Regular rhythm and normal rate, normal S1 and S2, no            murmur   Abdomen:     no masses, no organomegaly, soft non-tender, non-distended, no guarding, there is evidence of a well healed left inguinal incision, no obvious recurrent hernia   Extremities:   Moves all extremities well, no edema, no cyanosis, no             redness   Pulses:   Pulses palpable and equal bilaterally   Skin:   No bleeding, bruising or rash   Lymph nodes:   No palpable adenopathy   Neurologic:   Cranial nerves 2 - 12 grossly intact, sensation intact        Results Review:   I reviewed the patient's new clinical results.  I reviewed the patient's new imaging results and agree with the interpretation.  I reviewed the patient's other test results and agree with the interpretation    Review of Systems was reviewed and confirmed as accurate today.    Assessment/Plan :    1. Left lower quadrant pain    2. Pneumonia of both lungs due to infectious organism, unspecified part of lung        I had a detailed and extensive discussion with the patient in the office and they understand that they need no surgical intervention at this time.  We did perform ultrasound on the patient today in the office and did not see an obvious recurrent inguinal hernia, he does have some pain to palpation in this region.  I have clearly told the patient and his wife that he does not need surgical intervention now but rather to quit smoking and continue with antibiotics, I would like to re-evaluate him in 3 weeks and we will repeat his exam and possible ultrasound at that time.  This may be a muscle tear causing the pain given the patient's excessive history of  smoking.    Electronically signed by Alex Victoria MD  09/20/17        Scribed for Alex Victoria MD by Olivia Taylor. 9/20/2017  4:00 PM

## 2017-09-20 NOTE — PROGRESS NOTES
Case Management Discharge Note    Final Note: Discahrged home with home oxygen     Discharge Placement     No information found        Public Transit/Bus Service: Other    Discharge Codes: 01  Discharge to home

## 2017-09-20 NOTE — DISCHARGE SUMMARY
[facility]      Name:  Tesfaye Ang   Age:  41 y.o.  Sex:  male  :  1975  MRN:  [id number]   Visit Number:  [id number]  Primary Care Physician:  Karlee Thomas MD  Date of Discharge:  2017  Admission Date:  2017      Discharge Diagnosis:     Principal Problem:    Pneumonia of both upper lobes due to infectious organism  Active Problems:    Neuropathic pain    Hypertension    Seizures    Myocardial infarction    COPD (chronic obstructive pulmonary disease)    Respiratory failure with hypoxia         Consults:     Consults     Date and Time Order Name Status Description    2017 0822 Inpatient Consult to Pulmonology Completed              Hospital Course:  The patient was admitted on 2017, please see H&P for further details of HPI and ROS.    Patient is a 40 yo male with a pmhx CHF, COPD, CAD, bilateral inguinal hernias, HTN, Obesity, Seizures, Stroke who comes to the ED for progressive dyspnea with cough for the past three days with subjective fevers and chills with thick purulent green sputum. He reported no chest pain at the time. CT showed bilateral ground glass airspace disease and 2 right pleural based indeterminate nodules favoring post inflammatory given patients age. Patient was placed on IV Azithromycin/Ceftriaxone, nebs, steroids, mucolytic agent, placed on a nonrebreather mask and monitored in the ICU.  Coreg and Norvasc was added for patients uncontrolled HTN. Serial troponins have been negative.  EKG showed sinus tachy at 128 bt/min with no acute st changes.  Echo showed EF of 73% with normal left ventricular cavity size with mild LVH.  Patient was diuresed as needed with IV Lasix therapy.  Eventually his oxygen requirements improved and his O2 was titrated down.  Dr. jovel was consulted on the case. Patients urinary antigens for strep and Legionella were negative. He was encouraged to quit smoking. Patient during his  stay had some left inguinal pain from coughing.  Patient has had a hernia repair along that area many years ago. No obvious hernia felt on examination.  Surgery, Dr. Victoria stated he would follow him up at his [place] office today for an US evaluation.  Patient was made aware of surgery appointment.  Patient has finished his course of abx therapy.  He remains afebrile.  He has a white count likely reflective of the steroids he is on. His steroids are being tapered down. Will benefit from repeat CBC with his PCP f/u. He underwent a six minute walk for which it was recommended 2 LNC of O2 chronically.  Patient to f/u with Dr. Oconnor in 3-4 weeks with repeat CXR.  Patient is doing well today with no current soa on O2, no cp, n/v or abdominal pain.  He is tolerating his diet well and ambulating.      Vital Signs:    Temp:  [97.4 °F (36.3 °C)-98.2 °F (36.8 °C)] 97.9 °F (36.6 °C)  Heart Rate:  [57-86] 72  Resp:  [12-18] 18  BP: (111-150)/(68-89) 131/77          Physical Examination:    General Appearance:    Alert and cooperative, not in any acute distress.   Head:    Atraumatic and normocephalic, without obvious abnormality.   Eyes:        PERRLA, Extra-occular movements are within normal limits.    Lungs:     Chest shape is normal. Breath sounds heard bilaterally equally.  No crackles or wheezing.    Heart:    Normal S1 and S2, no murmur, no gallop, no rub.   Abdomen:     Normal bowel sounds,  Soft non-tender, non-distended, no guarding, no rebound tenderness   Extremities:   Moves all extremities well, no edema             Skin:   No bruising or rash.   Neurologic:  Grossly non focal and moves all extremities equally.          Pertinent Lab Results:     Lab Results (last 24 hours)     Procedure Component Value Units Date/Time    Basic Metabolic Panel [[id number]]  (Abnormal) Collected:  09/20/17 0556    Specimen:  Blood Updated:  09/20/17 0700     Glucose 87 mg/dL      BUN 33 (H) mg/dL      Creatinine 0.80 mg/dL       Sodium 136 (L) mmol/L      Potassium 4.5 mmol/L      Chloride 99 mmol/L      CO2 33.0 (H) mmol/L      Calcium 8.9 mg/dL      eGFR Non African Amer 107 mL/min/1.73      BUN/Creatinine Ratio 41.3 (H)     Anion Gap 8.5 mmol/L     Narrative:       Abnormal estimated GFR should be followed by more specific studies to confirm end stage chronic renal disease. The equation used for calculation may not be accurate for patients less than 19 years old, greater than 70 years old, patients at extremes of weight, malnutrition, or with acute renal dysfunction.    CBC & Differential [[id number]] Collected:  09/20/17 0556    Specimen:  Blood Updated:  09/20/17 0724    Narrative:       The following orders were created for panel order CBC & Differential.  Procedure                               Abnormality         Status                     ---------                               -----------         ------                     Manual Differential[[id number]]          Abnormal            Final result               Scan Slide[[id number]]                                       Final result               CBC Auto Differential[[id number]]        Abnormal            Final result                 Please view results for these tests on the individual orders.    CBC Auto Differential [[id number]]  (Abnormal) Collected:  09/20/17 0556    Specimen:  Blood Updated:  09/20/17 0724     WBC 16.69 (H) 10*3/mm3      RBC 5.95 10*6/mm3      Hemoglobin 16.5 g/dL      Hematocrit 51.2 %      MCV 86.1 fL      MCH 27.7 pg      MCHC 32.2 g/dL      RDW 14.5 %      RDW-SD 45.0 fl      MPV 10.1 fL      Platelets 178 10*3/mm3     Scan Slide [[id number]] Collected:  09/20/17 0556    Specimen:  Blood Updated:  09/20/17 0724     Scan Slide --      See Manual Differential Results       Manual Differential [[id number]]  (Abnormal) Collected:  09/20/17 0556    Specimen:  Blood Updated:  09/20/17 0724     Neutrophil % 79.0 %      Lymphocyte % 8.0 (L) %      Monocyte % 6.0 %       Bands %  3.0 %      Metamyelocyte % 3.0 (H) %      Myelocyte % 1.0 (H) %      Neutrophils Absolute 13.69 (H) 10*3/mm3      Lymphocytes Absolute 1.34 10*3/mm3      Monocytes Absolute 1.00 (H) 10*3/mm3      RBC Morphology Normal     WBC Morphology Normal     Platelet Estimate Adequate          Pertinent Radiology Results:  Imaging Results (most recent)     Procedure Component Value Units Date/Time    CT Chest Pulmonary Embolism With Contrast [[id number]] Collected:  09/09/17 1634     Updated:  09/09/17 1636    Narrative:       FINAL REPORT    TECHNIQUE:  Thin section axial CT images were performed from the lung apices to the upper abdomen after the administration of IV contrast.  3-D and MIP reconstructions performed. This study was performed with techniques to keep radiation doses as low as reasonably   achievable (ALARA). Individualized dose reduction techniques using automated exposure control or adjustment of mA and/or kV according to the patient''s size were employed.    CLINICAL HISTORY:  SOA    FINDINGS:  Contrast bolus is somewhat limited. There is no evidence for pulmonary embolism. The thoracic aorta is patent without evidence of dissection. There is no axillary adenopathy. There is no mediastinal or hilar adenopathy. The heart size is normal. There is   no pleural or pericardial effusion. There is bilateral perihilar groundglass airspace disease most consistent with pneumonia. Mycoplasma should be considered. Within the posterior, superior segment of the right lower lobe is an 8.5 mm pleural-based   nodule as well as a 5.5 mm noncalcified nodule seen laterally both on image #45 which are indeterminate. However, this is likely postinflammatory given patient's age.    Limited images of the upper abdomen are unremarkable.      Impression:       No evidence of pulmonary embolism or aortic dissection.    Bilateral perihilar groundglass airspace disease most consistent with pneumonia.    2 right lung pleural-based  nodules, indeterminate. Favor post inflammatory given patient's age.    Authenticated by Dallas Alvarez MD on 09/09/2017 04:34:53 PM    XR Chest 2 View [[id number]] Collected:  09/10/17 0852     Updated:  09/10/17 0852    Narrative:       2-VIEW CHEST     INDICATION: Shortness of breath.     FINDINGS: 2 views of the chest, compared with 02/24/2017. EKG leads  overlie the chest. No pneumothorax. Lung volumes are diminished with  some perihilar infiltrates.       Impression:       Mild perihilar infiltrates which may represent vascular  congestion or developing infiltrate from pneumonia. Follow-up PA and  lateral views recommended.    XR Chest 1 View [[id number]] Collected:  09/11/17 0724     Updated:  09/11/17 0727    Narrative:       PROCEDURE: XR CHEST 1 VW-     HISTORY: pneumonia; J18.9-Pneumonia, unspecified organism     COMPARISON: September 9, 2017.     FINDINGS: The heart is normal in size. The mediastinum is unremarkable.  There has been interval worsening in the patient's diffuse bilateral  airspace disease consistent with a pneumonia. No significant effusions  are evident. There is no pneumothorax.  There are no acute osseous  abnormalities.           Impression:       Worsening bilateral airspace disease consistent with a  pneumonia.     Continued followup is recommended.     This report was finalized on 9/11/2017 7:25 AM by Simba Caballero M.D..    XR Chest 1 View [[id number]] Collected:  09/12/17 0801     Updated:  09/12/17 0804    Narrative:       PROCEDURE: XR CHEST 1 VW-     HISTORY: Resp Failure.; J18.9-Pneumonia, unspecified organism     COMPARISON: 9/11/2017.     FINDINGS: The heart is normal in size. The mediastinum is unremarkable.  There is diffuse bilateral airspace disease, however there is improved  aeration of the right lung base compared to the prior exam. There is no  pneumothorax.  There are no acute osseous abnormalities.           Impression:       Improved aeration of the right lung  base with persistent  diffuse bilateral opacities.     Continued followup is recommended.     This report was finalized on 9/12/2017 8:02 AM by Simba Caballero M.D..    XR Chest 1 View [[id number]] Collected:  09/13/17 1020     Updated:  09/13/17 1022    Narrative:       PROCEDURE: XR CHEST 1 VW-     HISTORY: PNA; J18.9-Pneumonia, unspecified organism     COMPARISON: 9/12/2017.     FINDINGS: The heart is normal in size. The mediastinum is unremarkable.  There are diffuse bilateral pulmonary opacities which appear worse in  the right lung base compared to the prior exam. There is no  pneumothorax.  There are no acute osseous abnormalities.           Impression:       Worsening right basilar airspace disease with stable diffuse  opacities elsewhere.     Continued followup is recommended.     This report was finalized on 9/13/2017 10:20 AM by Simba Caballero M.D..    XR Chest 1 View [[id number]] Collected:  09/15/17 0757     Updated:  09/15/17 0801    Narrative:       PROCEDURE: XR CHEST 1 VW-     HISTORY: PNA; J18.9-Pneumonia, unspecified organism     COMPARISON: September 13, 2017.     FINDINGS: The heart is normal in size. The mediastinum is unremarkable.  There is improved aeration of the right lung base when compared to the  prior exam. There is a persistent left basilar opacity and there are  also increased interstitial markings. There is no pneumothorax.  There  are no acute osseous abnormalities.           Impression:       Improved aeration of the right lung base with persistent  increased interstitial markings and left basilar airspace disease.     Continued followup is recommended.     This report was finalized on 9/15/2017 7:59 AM by Simba Caballero M.D..    XR Chest 1 View [[id number]] Collected:  09/17/17 1044     Updated:  09/17/17 1046    Narrative:       PROCEDURE: XR CHEST 1 VW-     INDICATION:  PNA; J18.9-Pneumonia, unspecified organism     FINDINGS:  A portable view of the chest was  obtained.  Comparison is  made to a prior exam dated 9/15/2017.   Cardiac and mediastinal  silhouettes are within normal limits. Interstitial opacities and left  greater than right basilar airspace opacities have improved, but not  resolved. There continues to be a small left effusion. There is no  pneumothorax.       Impression:       Improving interstitial and alveolar opacities. Persistent  small left pleural effusion.     This report was finalized on 9/17/2017 10:44 AM by Meredith Jeong MD.            Code Status:  FULL     Discharge Disposition:    Home or Self Care    Discharge Medication:     Tesfaye Ang   Home Medication Instructions MIGUELINA:[id number]    Printed on:09/20/17 5547   Medication Information                      acetaminophen (TYLENOL 8 HOUR) 650 MG 8 hr tablet  Take 1 tablet by mouth Every 8 (Eight) Hours As Needed for mild pain (1-3).             albuterol (PROVENTIL HFA;VENTOLIN HFA) 108 (90 Base) MCG/ACT inhaler  Inhale 2 puffs Every 4 (Four) Hours As Needed for Wheezing or Shortness of Air.             amLODIPine (NORVASC) 10 MG tablet  Take 1 tablet by mouth Daily.             atorvastatin (LIPITOR) 20 MG tablet  Take 1 tablet by mouth Daily.             carvedilol (COREG) 12.5 MG tablet  Take 1 tablet by mouth 2 (Two) Times a Day With Meals.             CloNIDine (CATAPRES) 0.2 MG tablet  Take 1 tablet by mouth Every 12 (Twelve) Hours.             cyclobenzaprine (FLEXERIL) 5 MG tablet  Take 1 tablet by mouth At Night As Needed for muscle spasms.             hydrochlorothiazide (HYDRODIURIL) 25 MG tablet  Take 25 mg by mouth Daily.             lisinopril (PRINIVIL,ZESTRIL) 40 MG tablet  Take 1 tablet by mouth Daily.             mupirocin (BACTROBAN) 2 % ointment  Apply  topically 3 (Three) Times a Day.             predniSONE (DELTASONE) 5 MG tablet  Take 1 tablet by mouth Take As Directed for 1 dose.                 Discharge Diet:     Diet Instructions     Diet: Cardiac; Thin        Discharge Diet:  Cardiac   Fluid Consistency:  Thin                 Activity at Discharge:     Activity Instructions     Activity as Tolerated                     Follow-up Appointments:    Follow-up Information     Follow up with Elvis Oconnor MD Follow up on 10/3/2017.    Specialties:  Pulmonary Disease, Sleep Medicine    Why:  @9:30 am    Contact information:    [address]  [address] [facility] [address]  [place] [id number] [phone]          Follow up with Karlee Thomas MD .    Specialties:  Family Medicine, Emergency Medicine    Why:  F/U with PCP within one week with CBC and BMP check.     Contact information:    [address]  [facility] [address]  [facility] [address] [phone]               Jayesh Baltazar MD  09/20/17  3:12 PM    Time: Discharge 36 min

## 2017-09-21 ENCOUNTER — TRANSITIONAL CARE MANAGEMENT TELEPHONE ENCOUNTER (OUTPATIENT)
Dept: INTERNAL MEDICINE | Facility: CLINIC | Age: 42
End: 2017-09-21

## 2017-09-22 NOTE — OUTREACH NOTE
CONI call completed.  Please refer to TCM call flowsheet for call documentation.  Please see routing comments regarding request for nebulizer machine.

## 2017-09-24 NOTE — PROGRESS NOTES
[facility]    Name:  Tesfaye Ang   Age:  41 y.o.  Sex:  male  :  1975  MRN:  [id number]   Visit Number:  [id number]  Admission Date:  2017  Date Of Service:  17  Primary Care Physician:  Karlee Thomas MD     LOS: 3 days :  Patient Care Team:  Karlee Thomas MD as PCP - General (Family Medicine):    History taken from:     patient    Chief Complaint:      Dyspnea  Subjective     Interval History:     Patient seen today.  Patient continues to be short of breath but is mildly improved.  Continues with cough, but this is better as well.  He is admitted with bilateral pneumonia.  He continues to require high flow oxygen.  He has been off BiPAP.  He does require stay in the ICU.  Reviewed old left heart catheterization, which showed severe left ventricular hypertrophy we will order echocardiogram.  BNP has been ordered as well.  Patient denies any chest pressure, nausea, vomiting, or pain.    Review of Systems:     All systems were reviewed and negative except for:  Respiratory: positive for  cough, dry and shortness of air    Objective     Vital Signs:    Temp:  [97.8 °F (36.6 °C)-98.7 °F (37.1 °C)] 98 °F (36.7 °C)  Heart Rate:  [52-81] 60  Resp:  [18-26] 18  BP: (125-151)/() 140/95    Physical Exam:      General Appearance:    Alert, cooperative, in no acute distress   Head:    Normocephalic, without obvious abnormality, atraumatic   Eyes:            Lids and lashes normal, conjunctivae and sclerae normal, no   icterus, no pallor, corneas clear, PERRLA   Ears:    Ears appear intact with no abnormalities noted   Throat:   No oral lesions, no thrush, oral mucosa moist   Neck:   No adenopathy, supple, trachea midline, no thyromegaly, no   carotid bruit, no JVD   Back:     No kyphosis present, no scoliosis present, no skin lesions,      erythema or scars, no tenderness to percussion or                   palpation,   range of motion  normal   Lungs:     Rhonchi bilaterally without use of accessory muscles respiration.      Heart:    Regular rhythm and normal rate, normal S1 and S2, no            murmur, no gallop, no rub, no click   Chest Wall:    No abnormalities observed   Abdomen:     Normal bowel sounds, no masses, no organomegaly, soft        non-tender, non-distended, no guarding, no rebound                tenderness   Rectal:     Deferred   Extremities:   Moves all extremities well, no edema, no cyanosis, no             redness   Pulses:   Pulses palpable and equal bilaterally   Skin:   No bleeding, bruising or rash   Lymph nodes:   No palpable adenopathy   Neurologic:   Cranial nerves 2 - 12 grossly intact, sensation intact, DTR       present and equal bilaterally          Results Review:      I reviewed the patient's new clinical results.    Labs:  Lab Results (last 24 hours)     Procedure Component Value Units Date/Time    Blood Culture [[id number]]  (Normal) Collected:  09/09/17 1440    Specimen:  Blood from Hand, Right Updated:  09/12/17 1601     Blood Culture No growth at 3 days    Blood Culture [[id number]]  (Normal) Collected:  09/09/17 1432    Specimen:  Blood from Hand, Left Updated:  09/12/17 1601     Blood Culture No growth at 3 days    CBC & Differential [[id number]] Collected:  09/13/17 0409    Specimen:  Blood Updated:  09/13/17 0506    Narrative:       The following orders were created for panel order CBC & Differential.  Procedure                               Abnormality         Status                     ---------                               -----------         ------                     CBC Auto Differential[[id number]]        Abnormal            Final result                 Please view results for these tests on the individual orders.    CBC Auto Differential [[id number]]  (Abnormal) Collected:  09/13/17 0409    Specimen:  Blood Updated:  09/13/17 0506     WBC 13.28 (H) 10*3/mm3      RBC 5.34 10*6/mm3      Hemoglobin  14.9 g/dL      Hematocrit 45.0 %      MCV 84.3 fL      MCH 27.9 pg      MCHC 33.1 g/dL      RDW 14.3 %      RDW-SD 43.5 fl      MPV 10.4 fL      Platelets 170 10*3/mm3      Neutrophil % 88.2 (H) %      Lymphocyte % 7.1 (L) %      Monocyte % 3.3 %      Eosinophil % 0.1 %      Basophil % 0.2 %      Immature Grans % 1.1 (H) %      Neutrophils, Absolute 11.72 (H) 10*3/mm3      Lymphocytes, Absolute 0.94 10*3/mm3      Monocytes, Absolute 0.44 10*3/mm3      Eosinophils, Absolute 0.01 10*3/mm3      Basophils, Absolute 0.02 10*3/mm3      Immature Grans, Absolute 0.15 (H) 10*3/mm3      nRBC 0.0 /100 WBC     Renal Function Panel [[id number]]  (Abnormal) Collected:  09/13/17 0409    Specimen:  Blood Updated:  09/13/17 0543     Glucose 138 (H) mg/dL      BUN 26 (H) mg/dL      Creatinine 0.80 mg/dL      Sodium 140 mmol/L      Potassium 3.8 mmol/L      Chloride 104 mmol/L      CO2 29.0 mmol/L      Calcium 8.9 mg/dL      Albumin 3.20 (L) g/dL      Phosphorus 3.6 mg/dL      Anion Gap 10.8 mmol/L      BUN/Creatinine Ratio 32.5 (H)     eGFR Non  Amer 107 mL/min/1.73     Narrative:       Abnormal estimated GFR should be followed by more specific studies to confirm end stage chronic renal disease. The equation used for calculation may not be accurate for patients less than 19 years old, greater than 70 years old, patients at extremes of weight, malnutrition, or with acute renal dysfunction.    Magnesium [[id number]]  (Normal) Collected:  09/13/17 0409    Specimen:  Blood Updated:  09/13/17 0543     Magnesium 2.2 mg/dL     Blood Gas, Arterial [[id number]]  (Abnormal) Collected:  09/13/17 0546    Specimen:  Arterial Blood Updated:  09/13/17 0546     Site Arterial: right radial     Abhay's Test Positive     pH, Arterial 7.438 pH units      pCO2, Arterial 44.3 mm Hg      pO2, Arterial 94.2 mm Hg      HCO3, Arterial 29.9 (H) mmol/L      Base Excess, Arterial 5.7 mmol/L      O2 Saturation, Arterial 96.4 %      Hemoglobin, Blood Gas 15.1  g/dL      Barometric Pressure for Blood Gas 729 mmHg      Modality Cannula - High Flow     FIO2 100 %     MRSA Screen Culture [[id number]]  (Normal) Collected:  09/11/17 0445    Specimen:  Swab from Nares Updated:  09/13/17 0554     MRSA SCREEN CX No Methicillin Resistant Staphylococcus aureus isolated           Radiology:    Imaging Results (last 24 hours)     Procedure Component Value Units Date/Time    XR Chest 1 View [[id number]] Collected:  09/13/17 1020     Updated:  09/13/17 1022    Narrative:       PROCEDURE: XR CHEST 1 VW-     HISTORY: PNA; J18.9-Pneumonia, unspecified organism     COMPARISON: 9/12/2017.     FINDINGS: The heart is normal in size. The mediastinum is unremarkable.  There are diffuse bilateral pulmonary opacities which appear worse in  the right lung base compared to the prior exam. There is no  pneumothorax.  There are no acute osseous abnormalities.           Impression:       Worsening right basilar airspace disease with stable diffuse  opacities elsewhere.     Continued followup is recommended.     This report was finalized on 9/13/2017 10:20 AM by Simba Caballero M.D..          Medication Review:       amLODIPine 10 mg Oral Daily   azithromycin 500 mg Intravenous Once   budesonide 0.5 mg Nebulization BID - RT   carvedilol 12.5 mg Oral BID With Meals   ceftriaxone 1 g Intravenous Q24H   CloNIDine 0.2 mg Oral Q12H   enoxaparin 40 mg Subcutaneous Daily   hydrochlorothiazide 25 mg Oral Daily   ipratropium-albuterol 3 mL Nebulization Q4H - RT   lisinopril 40 mg Oral Daily   methylPREDNISolone sodium succinate 40 mg Intravenous Q6H            Assessment/Plan     Problem List Items Addressed This Visit     * (Principal)Pneumonia of both upper lobes due to infectious organism - Primary          1.  Acute hypoxic respiratory failure, present on admission.  2.  Bilateral diffuse bacterial pneumonia, present on admission.  3.  Acute COPD exacerbation.  4.  Coronary artery disease on medical  therapy.  5.  Essential hypertension.  6.  Seizure disorder.     Plan:    Reviewed case with Dr. Oconnor.  Continue with Rocephin and Zithromax.  Continue with Solu-Medrol and DuoNeb.  Patient seems to be improving slowly.  Continue with high flow oxygen.  We'll check echocardiogram, as patient had severe left ventricular hypertrophy 2 years ago.  This was noted on heart catheterization.  Check lab work in the a.m.  Further recommendations will depend on clinical course.    Josemanuel Downs DO  09/13/17  12:08 PM           [facility]

## 2017-09-25 ENCOUNTER — APPOINTMENT (OUTPATIENT)
Dept: GENERAL RADIOLOGY | Facility: HOSPITAL | Age: 42
End: 2017-09-25

## 2017-09-25 ENCOUNTER — HOSPITAL ENCOUNTER (EMERGENCY)
Facility: HOSPITAL | Age: 42
Discharge: HOME | End: 2017-09-26
Attending: STUDENT IN AN ORGANIZED HEALTH CARE EDUCATION/TRAINING PROGRAM | Admitting: STUDENT IN AN ORGANIZED HEALTH CARE EDUCATION/TRAINING PROGRAM

## 2017-09-25 DIAGNOSIS — J18.9 PNEUMONIA OF BOTH UPPER LOBES DUE TO INFECTIOUS ORGANISM: Primary | ICD-10-CM

## 2017-09-25 DIAGNOSIS — J44.9 CHRONIC OBSTRUCTIVE PULMONARY DISEASE, UNSPECIFIED COPD TYPE (HCC): ICD-10-CM

## 2017-09-25 LAB
ALBUMIN SERPL-MCNC: 3.5 G/DL (ref 3.5–5)
ALBUMIN/GLOB SERPL: 1.2 G/DL (ref 1–2)
ALP SERPL-CCNC: 38 U/L (ref 38–126)
ALT SERPL W P-5'-P-CCNC: 59 U/L (ref 13–69)
ANION GAP SERPL CALCULATED.3IONS-SCNC: 14.4 MMOL/L
AST SERPL-CCNC: 27 U/L (ref 15–46)
BASOPHILS # BLD AUTO: 0.01 10*3/MM3 (ref 0–0.2)
BASOPHILS NFR BLD AUTO: 0.1 % (ref 0–2.5)
BILIRUB SERPL-MCNC: 0.8 MG/DL (ref 0.2–1.3)
BUN BLD-MCNC: 22 MG/DL (ref 7–20)
BUN/CREAT SERPL: 24.4 (ref 6.3–21.9)
CALCIUM SPEC-SCNC: 8.6 MG/DL (ref 8.4–10.2)
CHLORIDE SERPL-SCNC: 108 MMOL/L (ref 98–107)
CO2 SERPL-SCNC: 20 MMOL/L (ref 26–30)
CREAT BLD-MCNC: 0.9 MG/DL (ref 0.6–1.3)
DEPRECATED RDW RBC AUTO: 48.8 FL (ref 37–54)
EOSINOPHIL # BLD AUTO: 0.08 10*3/MM3 (ref 0–0.7)
EOSINOPHIL NFR BLD AUTO: 0.6 % (ref 0–7)
ERYTHROCYTE [DISTWIDTH] IN BLOOD BY AUTOMATED COUNT: 15.6 % (ref 11.5–14.5)
GFR SERPL CREATININE-BSD FRML MDRD: 93 ML/MIN/1.73
GLOBULIN UR ELPH-MCNC: 3 GM/DL
GLUCOSE BLD-MCNC: 108 MG/DL (ref 74–98)
HCT VFR BLD AUTO: 43.1 % (ref 42–52)
HGB BLD-MCNC: 13.9 G/DL (ref 14–18)
IMM GRANULOCYTES # BLD: 0.12 10*3/MM3 (ref 0–0.06)
IMM GRANULOCYTES NFR BLD: 0.9 % (ref 0–0.6)
LYMPHOCYTES # BLD AUTO: 1.4 10*3/MM3 (ref 0.6–3.4)
LYMPHOCYTES NFR BLD AUTO: 10.4 % (ref 10–50)
MCH RBC QN AUTO: 27.7 PG (ref 27–31)
MCHC RBC AUTO-ENTMCNC: 32.3 G/DL (ref 30–37)
MCV RBC AUTO: 86 FL (ref 80–94)
MONOCYTES # BLD AUTO: 1.4 10*3/MM3 (ref 0–0.9)
MONOCYTES NFR BLD AUTO: 10.4 % (ref 0–12)
NEUTROPHILS # BLD AUTO: 10.43 10*3/MM3 (ref 2–6.9)
NEUTROPHILS NFR BLD AUTO: 77.6 % (ref 37–80)
NRBC BLD MANUAL-RTO: 0 /100 WBC (ref 0–0)
NT-PROBNP SERPL-MCNC: 533 PG/ML (ref 0–125)
PLATELET # BLD AUTO: 159 10*3/MM3 (ref 130–400)
PMV BLD AUTO: 10.1 FL (ref 6–12)
POTASSIUM BLD-SCNC: 4.4 MMOL/L (ref 3.5–5.1)
PROT SERPL-MCNC: 6.5 G/DL (ref 6.3–8.2)
RBC # BLD AUTO: 5.01 10*6/MM3 (ref 4.7–6.1)
SODIUM BLD-SCNC: 138 MMOL/L (ref 137–145)
TROPONIN I SERPL-MCNC: 0.02 NG/ML (ref 0–0.03)
WBC NRBC COR # BLD: 13.44 10*3/MM3 (ref 4.8–10.8)

## 2017-09-25 PROCEDURE — 80053 COMPREHEN METABOLIC PANEL: CPT | Performed by: NURSE PRACTITIONER

## 2017-09-25 PROCEDURE — 94799 UNLISTED PULMONARY SVC/PX: CPT

## 2017-09-25 PROCEDURE — 94640 AIRWAY INHALATION TREATMENT: CPT

## 2017-09-25 PROCEDURE — 96361 HYDRATE IV INFUSION ADD-ON: CPT

## 2017-09-25 PROCEDURE — 84484 ASSAY OF TROPONIN QUANT: CPT | Performed by: NURSE PRACTITIONER

## 2017-09-25 PROCEDURE — 99284 EMERGENCY DEPT VISIT MOD MDM: CPT

## 2017-09-25 PROCEDURE — 83880 ASSAY OF NATRIURETIC PEPTIDE: CPT | Performed by: NURSE PRACTITIONER

## 2017-09-25 PROCEDURE — 93005 ELECTROCARDIOGRAM TRACING: CPT | Performed by: NURSE PRACTITIONER

## 2017-09-25 PROCEDURE — 96360 HYDRATION IV INFUSION INIT: CPT

## 2017-09-25 PROCEDURE — 85025 COMPLETE CBC W/AUTO DIFF WBC: CPT | Performed by: NURSE PRACTITIONER

## 2017-09-25 PROCEDURE — 71020 HC CHEST PA AND LATERAL: CPT

## 2017-09-25 RX ORDER — SODIUM CHLORIDE 0.9 % (FLUSH) 0.9 %
10 SYRINGE (ML) INJECTION AS NEEDED
Status: DISCONTINUED | OUTPATIENT
Start: 2017-09-25 | End: 2017-09-26 | Stop reason: HOSPADM

## 2017-09-25 RX ORDER — IPRATROPIUM BROMIDE AND ALBUTEROL SULFATE 2.5; .5 MG/3ML; MG/3ML
3 SOLUTION RESPIRATORY (INHALATION) ONCE
Status: COMPLETED | OUTPATIENT
Start: 2017-09-25 | End: 2017-09-25

## 2017-09-25 RX ADMIN — IPRATROPIUM BROMIDE AND ALBUTEROL SULFATE 3 ML: .5; 3 SOLUTION RESPIRATORY (INHALATION) at 23:23

## 2017-09-25 RX ADMIN — SODIUM CHLORIDE 1000 ML: 9 INJECTION, SOLUTION INTRAVENOUS at 22:00

## 2017-09-26 VITALS
TEMPERATURE: 99.1 F | BODY MASS INDEX: 32.14 KG/M2 | HEART RATE: 82 BPM | DIASTOLIC BLOOD PRESSURE: 73 MMHG | RESPIRATION RATE: 18 BRPM | HEIGHT: 66 IN | WEIGHT: 200 LBS | SYSTOLIC BLOOD PRESSURE: 120 MMHG | OXYGEN SATURATION: 98 %

## 2017-09-26 LAB — TROPONIN I SERPL-MCNC: 0.02 NG/ML (ref 0–0.03)

## 2017-09-26 RX ORDER — ALBUTEROL SULFATE 90 UG/1
2 AEROSOL, METERED RESPIRATORY (INHALATION) ONCE
Status: COMPLETED | OUTPATIENT
Start: 2017-09-26 | End: 2017-09-26

## 2017-09-26 RX ORDER — IPRATROPIUM BROMIDE AND ALBUTEROL SULFATE 2.5; .5 MG/3ML; MG/3ML
3 SOLUTION RESPIRATORY (INHALATION) EVERY 4 HOURS PRN
Qty: 360 ML | Refills: 3 | Status: SHIPPED | OUTPATIENT
Start: 2017-09-26

## 2017-09-26 RX ADMIN — ALBUTEROL SULFATE 2 PUFF: 90 AEROSOL, METERED RESPIRATORY (INHALATION) at 00:47

## 2017-09-26 NOTE — TELEPHONE ENCOUNTER
CONTACTED PATIENT TO LET HIM KNOW THAT I HAVE A NEBULIZER FOR HIM. HE SAID HIS WIFE WOULD COME BACK AFTER WORK AND PICK IT UP. HE ALSO NEEDS NEBULIZER SOLUTION. I ADVISED THE PATIENT THAT I WOULD SEND IN A REFILL TO HIS PHARMACY.

## 2017-09-29 ENCOUNTER — TELEPHONE (OUTPATIENT)
Dept: PULMONOLOGY | Facility: CLINIC | Age: 42
End: 2017-09-29

## 2017-09-29 NOTE — TELEPHONE ENCOUNTER
CALLED AND LEFT A MESSAGE FOR PATIENT TO CALL HE HAD MENTIONED THAT HE WAS GOING TO TRY TO GO TO URGENT CARE.

## 2017-10-02 ENCOUNTER — OFFICE VISIT (OUTPATIENT)
Dept: INTERNAL MEDICINE | Facility: CLINIC | Age: 42
End: 2017-10-02

## 2017-10-02 ENCOUNTER — TELEPHONE (OUTPATIENT)
Dept: PULMONOLOGY | Facility: CLINIC | Age: 42
End: 2017-10-02

## 2017-10-02 VITALS
HEIGHT: 66 IN | OXYGEN SATURATION: 98 % | WEIGHT: 189 LBS | SYSTOLIC BLOOD PRESSURE: 120 MMHG | DIASTOLIC BLOOD PRESSURE: 82 MMHG | BODY MASS INDEX: 30.37 KG/M2 | HEART RATE: 93 BPM | TEMPERATURE: 96.8 F

## 2017-10-02 DIAGNOSIS — I25.119 CORONARY ARTERY DISEASE INVOLVING NATIVE CORONARY ARTERY OF NATIVE HEART WITH ANGINA PECTORIS (HCC): ICD-10-CM

## 2017-10-02 DIAGNOSIS — R94.31 EKG ABNORMALITIES: Primary | ICD-10-CM

## 2017-10-02 DIAGNOSIS — Z09 HOSPITAL DISCHARGE FOLLOW-UP: ICD-10-CM

## 2017-10-02 DIAGNOSIS — R79.89 ABNORMAL CBC: ICD-10-CM

## 2017-10-02 DIAGNOSIS — I25.2 HISTORY OF MYOCARDIAL INFARCTION: Chronic | ICD-10-CM

## 2017-10-02 DIAGNOSIS — Z87.01 HISTORY OF PNEUMONIA: ICD-10-CM

## 2017-10-02 PROCEDURE — 99495 TRANSJ CARE MGMT MOD F2F 14D: CPT | Performed by: FAMILY MEDICINE

## 2017-10-02 PROCEDURE — 93000 ELECTROCARDIOGRAM COMPLETE: CPT | Performed by: FAMILY MEDICINE

## 2017-10-02 NOTE — PROGRESS NOTES
Transitional Care Follow Up Visit  Subjective     Tesfaye Ang is a 42 y.o. male who presents for a transitional care management visit.    Within 48 business hours after discharge our office contacted him via telephone to coordinate his care and needs.      I reviewed and discussed the details of that call along with the discharge summary, hospital problems, inpatient lab results, inpatient diagnostic studies, and consultation reports with Tesfaye.     Current outpatient and discharge medications have been reconciled for the patient.    Date of TCM Phone Call 9/21/2017 9/22/2017   [facility]   Date of Admission 9/10/2017 9/10/2017   Date of Discharge 9/20/2017 9/20/2017   Discharge Disposition Home or Self Care Home or Self Care     Risk for Readmission (LACE) Score: 11    History of Present Illness   Course During Hospital Stay:  [facility] after presenting with progressive dyspnea and productive cough as well as fever. Abnormal CT chest. Treated with IV rocephin and azithromycin as well as steroids. Serial troponins negative. Required IV diuresis. Seen by pulmonary, tested negative for Legionella and S.pneumoniae antigens. Qualified for oxygen on six minute walk. Scheduled to see pulmonary tomorrow.    Feeling better overall compared to when he was admitted. Had completed antibiotics prior to discharge. Has had lab draws recently and declines labs today, which were recommended per discharge summary. No chest pain. Currently does not have a cardiologist, but is open to this. Has had MI in past. Last stress test was a couple of years ago. He thinks he may be due for a cardiac cath. Has remote history of cocaine abuse. Previously had a very low EF.     The following portions of the patient's history were reviewed and updated as appropriate: allergies, current medications, past family history, past medical history,  past social history, past surgical history and problem list.    Review of Systems   Constitutional: Positive for fatigue. Negative for fever.   Respiratory: Positive for cough.    Cardiovascular: Negative for chest pain.       Objective   Physical Exam   Constitutional: He is oriented to person, place, and time. Vital signs are normal. He appears well-developed and well-nourished. He is active. He has a sickly appearance. He does not appear ill.   Appears stated age. Well groomed. Obese   HENT:   Head: Normocephalic and atraumatic. 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   Right Ear: Hearing normal.   Left Ear: Hearing normal.   Nose: Nose normal.   Mouth/Throat: Abnormal dentition.   Eyes: EOM and lids are normal. Pupils are equal, round, and reactive to light. No scleral icterus.   Neck: Neck supple.   Slightly hoarse voice.   Cardiovascular: Normal rate, regular rhythm and normal heart sounds.    Pulmonary/Chest: Effort normal and breath sounds normal. No stridor.   Neurological: He is alert and oriented to person, place, and time. He displays no tremor. No cranial nerve deficit. Gait (walks with cane) abnormal.   Skin: Skin is warm. Bruising (arms from lab draws) noted. He is not diaphoretic. No cyanosis. Nails show no clubbing.   Psychiatric: He has a normal mood and affect. His speech is normal and behavior is normal. Judgment and thought content normal. Cognition and memory are normal.   Nursing note and vitals reviewed.        ECG 12 Lead  Date/Time: 10/2/2017 3:28 PM  Performed by: BETH GARCIA  Authorized by: BETH GARCIA   Comparison: compared with previous ECG from 9/25/2017  Comparison to previous ECG: Also compared to 12/28/16. There are many changes compared to the older EKG.   Rhythm: sinus rhythm  Rate: normal  BPM: 79  Conduction: conduction normal  ST Elevation: III, V1, V2 and V3 (Similar to EKG from 12/2016. III same on 9/25/17 EKG)  ST segment depression noted on lead: the ST  depression on I and aVL from 9/25/17 has resolved.  T elevation: I, aVL, aVR, V5 and V6 (Similar to 9/25/17 EKG; the lateral T wave inversions were not on 12/2016 EKG)  QRS axis: normal  Other findings: LVH  Q waves: III and aVF  Clinical impression: abnormal ECG            Assessment/Plan   Tesfaye was seen today for pneumonia.    Diagnoses and all orders for this visit:    EKG abnormalities  -     Ambulatory Referral to Cardiology  -     ECG 12 Lead    Coronary artery disease involving native coronary artery of native heart with angina pectoris  -     Ambulatory Referral to Cardiology  -     Comprehensive Metabolic Panel  -     ECG 12 Lead    Abnormal CBC  -     CBC & Differential  -     Comprehensive Metabolic Panel  -     Peripheral Blood Smear - Blood,    History of pneumonia    Hospital discharge follow-up    follow up with pulmonary as scheduled. I can refill Tussionex if needed, but patient needs to expectorate during the day. Discussed EKGs with patient, may need further testing, possibly cath. Defer decision to Dr. Casey. I asked him to return next week to repeat the labs especially since his wbc elevated after discharge. He agreed to return for labs.

## 2017-10-02 NOTE — TELEPHONE ENCOUNTER
I HAD TRIED TO CALL PATIENT BACK NO ANSWER. I HAD TOLD HIM TO CALL US BACK IF HE WASN'T FEELING BETTER.      ----- Message from Elvis Oconnor MD sent at 9/28/2017  3:41 PM EDT -----  He will have to come in and  a script for Tussionex. Ask him if he can come in tomorrow.    ----- Message -----     From: Allison Cortez MA     Sent: 9/27/2017   4:47 PM       To: Elvis Oconnor MD          ----- Message -----     From: SHALOM Raymundo     Sent: 9/27/2017   3:48 PM       To: Allison Cortez MA    I'm not familiar with him so please ask Doc.    Thanks.    ----- Message -----     From: Allison Cortez MA     Sent: 9/27/2017  11:57 AM       To: SHALOM Raymundo  THIS PATIENT WAS SEEN IN THE HOSPITAL BY DOC, AND HE SAID HE HAD DOUBLE PNEUMONIA AND HE  SAID THAT HE HAS BEEN COUGHING SO BAD IN THE LAST FEW MINUTES THAT HE HASNT BEEN ABLE TO SLEEP, IS THERE ANYTHING WE CAN GIVE HIM TO TIDE HIM OVER UNTIL NEXT Tuesday? HE HAS AN APPT THAT DAY.    THANKS

## 2017-10-03 ENCOUNTER — OUTSIDE FACILITY SERVICE (OUTPATIENT)
Dept: INTERNAL MEDICINE | Facility: HOSPITAL | Age: 42
End: 2017-10-03

## 2017-10-03 ENCOUNTER — OFFICE VISIT (OUTPATIENT)
Dept: PULMONOLOGY | Facility: CLINIC | Age: 42
End: 2017-10-03

## 2017-10-03 VITALS
SYSTOLIC BLOOD PRESSURE: 124 MMHG | BODY MASS INDEX: 30.7 KG/M2 | OXYGEN SATURATION: 98 % | WEIGHT: 191 LBS | RESPIRATION RATE: 18 BRPM | DIASTOLIC BLOOD PRESSURE: 82 MMHG | HEIGHT: 66 IN | HEART RATE: 84 BPM

## 2017-10-03 DIAGNOSIS — J44.9 CHRONIC OBSTRUCTIVE PULMONARY DISEASE, UNSPECIFIED COPD TYPE (HCC): ICD-10-CM

## 2017-10-03 DIAGNOSIS — J18.9 PNEUMONIA OF BOTH UPPER LOBES DUE TO INFECTIOUS ORGANISM: Primary | ICD-10-CM

## 2017-10-03 DIAGNOSIS — G47.33 OSA (OBSTRUCTIVE SLEEP APNEA): ICD-10-CM

## 2017-10-03 DIAGNOSIS — R06.02 SHORTNESS OF BREATH: ICD-10-CM

## 2017-10-03 DIAGNOSIS — G47.19 EXCESSIVE DAYTIME SLEEPINESS: ICD-10-CM

## 2017-10-03 DIAGNOSIS — R09.02 HYPOXIA: ICD-10-CM

## 2017-10-03 DIAGNOSIS — F17.200 SMOKING: ICD-10-CM

## 2017-10-03 PROCEDURE — 94620 PR PULMONARY STRESS TESTING,SIMPLE: CPT | Performed by: NURSE PRACTITIONER

## 2017-10-03 PROCEDURE — 99214 OFFICE O/P EST MOD 30 MIN: CPT | Performed by: NURSE PRACTITIONER

## 2017-10-03 RX ORDER — BUDESONIDE AND FORMOTEROL FUMARATE DIHYDRATE 160; 4.5 UG/1; UG/1
2 AEROSOL RESPIRATORY (INHALATION)
Qty: 1 INHALER | Refills: 5 | Status: SHIPPED | OUTPATIENT
Start: 2017-10-03 | End: 2018-03-07 | Stop reason: SDUPTHER

## 2017-10-03 NOTE — PROGRESS NOTES
"Chief Complaint   Patient presents with   • Follow-up     Hospital         Subjective   Tesfaye Ang is a 42 y.o. male.     History of Present Illness   The patient is here for hospital follow up. He is better but still coughing at night. He still gets fatigued easily but he is no longer labored with walking. He was discharged from the hospital with oxygen, but he has weaned off of the oxygen and he is no longer using it.    He has not smoked since 1 month ago. Prior to that he smoked 1 1/2 ppd of cigarettes.     The patient is also complaining of excessive daytime sleepiness.  His wife has noticed that he awakens from sleep gasping from air and she has witnessed that he stops breathing during sleep.  She has also told him that he snores significantly.  He does not nap during the day but does is sleepy if he is able to sit still.    Caffeine wise he drinks two 20 ounce bottles of soda on a regular basis.  He does not drink coffee.    The following portions of the patient's history were reviewed and updated as appropriate: allergies, current medications, past family history, past medical history, past social history and past surgical history.    Review of Systems   HENT: Negative for sinus pressure, sneezing and sore throat.    Respiratory: Positive for cough. Negative for shortness of breath and wheezing.        Objective   Visit Vitals   • /82   • Pulse 84   • Resp 18   • Ht 66\" (167.6 cm)   • Wt 191 lb (86.6 kg)   • SpO2 98%   • BMI 30.83 kg/m2     Physical Exam   Constitutional: He is oriented to person, place, and time. He appears well-developed.   HENT:   Head: Normocephalic and atraumatic.   Crowded oropharynx.   Eyes: EOM are normal.   Neck: Neck supple.   Cardiovascular: Normal rate and regular rhythm.    Pulmonary/Chest: Effort normal. No respiratory distress.   Somewhat hyperresonant to percussion  Somewhat decreased A/E with wheezing noted.   Musculoskeletal: He exhibits no edema.   Walking " with a cane.   Neurological: He is alert and oriented to person, place, and time.   Skin: Skin is warm and dry.   Psychiatric: He has a normal mood and affect.   Vitals reviewed.          Assessment/Plan   Tesfaye was seen today for follow-up.    Diagnoses and all orders for this visit:    Pneumonia of both upper lobes due to infectious organism    Chronic obstructive pulmonary disease, unspecified COPD type  -     Pulmonary Function Test; Future    Hypoxia  -     Converted Six Minute Walk  -     Overnight Sleep Oximetry Study; Future    Smoking    ALTAGRACIA (obstructive sleep apnea)  -     Cancel: Polysomnography 4 or More Parameters; Future  -     Polysomnography 4 or More Parameters; Future    Excessive daytime sleepiness  -     Cancel: Polysomnography 4 or More Parameters; Future  -     Polysomnography 4 or More Parameters; Future    Shortness of breath  -     Pulmonary Function Test; Future    Other orders  -     budesonide-formoterol (SYMBICORT) 160-4.5 MCG/ACT inhaler; Inhale 2 puffs 2 (Two) Times a Day. Rinse mouth with water after use.           Return in about 4 weeks (around 10/31/2017) for Recheck, PFT, Sleep study, For Dr. Oconnor.    DISCUSSION (if any):  I reviewed the discharge summary. He was admitted and treated for hypoxic respiratory failure as well as pneumonia.  He was requiring higher amounts of oxygen in the hospital and was found to the oxygen at discharge as well.  He has not been using this oxygen because he felt that he had improved and did not need the oxygen.  This is not the case and I have discussed this in great detail with him.  On 6 minute walk test which was performed today his oxygen saturation dropped to 86% within 2 minutes of walking.  He was placed on 2 L/m of oxygen and his oxygen saturation recovered to 97%.  He is aware that he needs oxygen with exertion and is willing to use it.  He is worried that he is not able to go back to work since he is still using oxygen.  I have ordered  an overnight pulse oximetry to assess whether he needs oxygen at night as well.    I will ordered a PFT for the future as he is still recovering from his hospitalization.    He is clearly having symptoms of obstructive sleep apnea so I will order a sleep study to be performed as soon as possible.    Somehow he was not sent home with prescriptions for inhalers so I have started him on Symbicort today and I have asked him to continue to use albuterol as needed for shortness of breath and wheezing.  He also has duo nebs to use in the nebulizer which she may use as needed.     Dictated utilizing Dragon dictation.    This document was electronically signed by SHALOM Russo October 3, 2017  2:54 PM

## 2017-10-12 ENCOUNTER — CONSULT (OUTPATIENT)
Dept: CARDIOLOGY | Facility: CLINIC | Age: 42
End: 2017-10-12

## 2017-10-12 VITALS
WEIGHT: 194.2 LBS | BODY MASS INDEX: 31.21 KG/M2 | HEART RATE: 95 BPM | HEIGHT: 66 IN | SYSTOLIC BLOOD PRESSURE: 140 MMHG | DIASTOLIC BLOOD PRESSURE: 80 MMHG | OXYGEN SATURATION: 98 % | RESPIRATION RATE: 20 BRPM

## 2017-10-12 DIAGNOSIS — J43.2 CENTRILOBULAR EMPHYSEMA (HCC): ICD-10-CM

## 2017-10-12 DIAGNOSIS — I25.10 CORONARY ARTERY DISEASE INVOLVING NATIVE CORONARY ARTERY OF NATIVE HEART WITHOUT ANGINA PECTORIS: ICD-10-CM

## 2017-10-12 DIAGNOSIS — I20.8 ANGINAL EQUIVALENT (HCC): ICD-10-CM

## 2017-10-12 DIAGNOSIS — I10 ESSENTIAL HYPERTENSION: Primary | ICD-10-CM

## 2017-10-12 DIAGNOSIS — R06.02 SOB (SHORTNESS OF BREATH): ICD-10-CM

## 2017-10-12 PROBLEM — I20.89 ANGINAL EQUIVALENT: Status: ACTIVE | Noted: 2017-10-12

## 2017-10-12 PROCEDURE — 93000 ELECTROCARDIOGRAM COMPLETE: CPT | Performed by: INTERNAL MEDICINE

## 2017-10-12 PROCEDURE — 99204 OFFICE O/P NEW MOD 45 MIN: CPT | Performed by: INTERNAL MEDICINE

## 2017-10-12 NOTE — PROGRESS NOTES
Subjective:     Encounter Date:10/12/2017      Patient ID: Tesfaye Ang is a 42 y.o. male.    Chief Complaint:Shortness of breath  HPI  This is a 42-year-old male patient who presents to cardiology clinic with onset of shortness of breath since September of this year.  The patient was hospitalized for severe pneumonia in September of this year.  He stop smoking around that time.  The patient is a former heavy smoker.  He was discharged to home with oxygen therapy.  The patient indicates that his shortness of breath is present continuously throughout the day.  It is present every day.  Shortness of breath is worse with activity.  It is improved with rest.  No other associated symptoms.  He denies having chest discomfort.  There is no exertional chest arm neck jaw shoulder or back discomfort.  He has no orthopnea PND or lower extremity edema.  There is no dizziness palpitations or syncope.  The patient has a history of hypertension.  His family history is strongly positive for premature coronary disease.  The patient hasn't unsubstantiated history of a previous myocardial infarction at the age of 29 while in [place] that was associated with drug abuse.  He also has what he claims to be another heart attack that occurred later in [place] also related to substance abuse.  The patient has had 2 recent echocardiograms which showed concentric left ventricular hypertrophy with evidence of diastolic dysfunction but a normal ejection fraction and no regional wall motion abnormalities.  The absence of regional wall motion abnormality would exclude the diagnosis of a previous transmural myocardial infarction.  The patient's echocardiogram also showed moderate secondary pulmonary hypertension consistent with chronic cor pulmonale and tobacco-related lung disease.  He was recently started on home oxygen therapy due to severe emphysema.  The patient 2 years ago underwent a vasodilator nuclear stress test which  showed no evidence of ischemia or infarction.  The absence of a perfusion abnormality on this study would argue against the patient ever having had either a transient-year-old or a subendocardial myocardial infarction.  In retrospect it is probably more likely the patient had a small enzyme leak due to substance abuse which is common in the absence of a true myocardial infarction.  The patient had an electrocardiogram done earlier this month which showed voltage criteria for left ventricular hypertrophy with lateral T-wave inversions consistent with a strain pattern.  The following portions of the patient's history were reviewed and updated as appropriate: allergies, current medications, past family history, past medical history, past social history, past surgical history and problem  Review of Systems   Constitution: Positive for malaise/fatigue. Negative for chills, diaphoresis, fever, weakness, night sweats, weight gain and weight loss.   HENT: Negative for ear discharge, hearing loss, hoarse voice and nosebleeds.    Eyes: Negative for discharge, double vision, pain and photophobia.   Cardiovascular: Positive for dyspnea on exertion. Negative for claudication, cyanosis, irregular heartbeat, leg swelling, near-syncope, orthopnea, palpitations, paroxysmal nocturnal dyspnea and syncope.   Respiratory: Positive for shortness of breath. Negative for cough, hemoptysis, sputum production and wheezing.    Endocrine: Negative for cold intolerance, heat intolerance, polydipsia, polyphagia and polyuria.   Hematologic/Lymphatic: Negative for adenopathy and bleeding problem. Does not bruise/bleed easily.   Skin: Negative for color change, flushing, itching and rash.   Musculoskeletal: Negative for muscle cramps, muscle weakness, myalgias and stiffness.   Gastrointestinal: Negative for abdominal pain, diarrhea, hematemesis, hematochezia, nausea and vomiting.   Genitourinary: Negative for dysuria, frequency and nocturia.    Neurological: Negative for dizziness, focal weakness, loss of balance, numbness, paresthesias and seizures.   Psychiatric/Behavioral: Negative for altered mental status, hallucinations and suicidal ideas.   Allergic/Immunologic: Negative for HIV exposure, hives and persistent infections.       Current Outpatient Prescriptions:   •  albuterol (PROVENTIL HFA;VENTOLIN HFA) 108 (90 Base) MCG/ACT inhaler, Inhale 2 puffs Every 4 (Four) Hours As Needed for Wheezing or Shortness of Air., Disp: 6.7 g, Rfl: 0  •  amLODIPine (NORVASC) 10 MG tablet, Take 1 tablet by mouth Daily., Disp: 30 tablet, Rfl: 11  •  budesonide-formoterol (SYMBICORT) 160-4.5 MCG/ACT inhaler, Inhale 2 puffs 2 (Two) Times a Day. Rinse mouth with water after use., Disp: 1 inhaler, Rfl: 5  •  carvedilol (COREG) 12.5 MG tablet, Take 1 tablet by mouth 2 (Two) Times a Day With Meals., Disp: 60 tablet, Rfl: 0  •  CloNIDine (CATAPRES) 0.2 MG tablet, Take 1 tablet by mouth Every 12 (Twelve) Hours., Disp: 60 tablet, Rfl: 0  •  hydrochlorothiazide (HYDRODIURIL) 25 MG tablet, Take 25 mg by mouth Daily., Disp: , Rfl:   •  HYDROcod Polst-CPM Polst ER (TUSSIONEX PENNKINETIC ER) 10-8 MG/5ML ER suspension, Take 5 mL by mouth Every 12 (Twelve) Hours As Needed for Cough., Disp: 100 mL, Rfl: 0  •  HYDROcodone-acetaminophen (NORCO) 7.5-325 MG per tablet, TAKE 1 TABLET BY MOUTH 3 TIMES A DAY AS NEEDED, Disp: , Rfl: 0  •  ipratropium-albuterol (DUO-NEB) 0.5-2.5 mg/mL nebulizer, Take 3 mL by nebulization Every 4 (Four) Hours As Needed for Wheezing., Disp: 360 mL, Rfl: 3  •  lisinopril (PRINIVIL,ZESTRIL) 40 MG tablet, Take 1 tablet by mouth Daily., Disp: 30 tablet, Rfl: 0     Objective:     Physical Exam   Constitutional: He is oriented to person, place, and time. He appears well-developed and well-nourished.   HENT:   Head: Normocephalic and atraumatic.   Mouth/Throat: Oropharynx is clear and moist.   Eyes: Conjunctivae and EOM are normal. Pupils are equal, round, and  "reactive to light. No scleral icterus.   Neck: Normal range of motion. Neck supple. No JVD present. No tracheal deviation present. No thyromegaly present.   Cardiovascular: Normal rate, regular rhythm, S1 normal, S2 normal, normal heart sounds, intact distal pulses and normal pulses.  PMI is not displaced.  Exam reveals no gallop and no friction rub.    No murmur heard.  Pulmonary/Chest: Effort normal and breath sounds normal. No respiratory distress. He has no wheezes. He has no rales.   Abdominal: Soft. Bowel sounds are normal. He exhibits no distension and no mass. There is no tenderness. There is no rebound and no guarding.   Musculoskeletal: Normal range of motion. He exhibits no edema or deformity.   Neurological: He is alert and oriented to person, place, and time. He displays normal reflexes. No cranial nerve deficit. Coordination normal.   Skin: Skin is warm and dry. No rash noted. No erythema.   Psychiatric: He has a normal mood and affect. His behavior is normal. Thought content normal.     Blood pressure 140/80, pulse 95, resp. rate 20, height 66\" (167.6 cm), weight 194 lb 3.2 oz (88.1 kg), SpO2 98 %.   Lab Review:       Assessment:         1. Essential hypertension  Acceptable blood pressure control.    2. Coronary artery disease involving native coronary artery of native heart without angina pectoris  This is undocumented.  - Stress Test With Myocardial Perfusion Two Day    3. Centrilobular emphysema  The patient stopped smoking one month ago.    4. SOB (shortness of breath)  This is multifactorial in etiology.  Some is certainly related to his underlying emphysema and prior tobacco abuse.  Some is related to aerobic deconditioning.  There may be an element of hypertensive related cardiac disease.  His last echocardiogram showed evidence of diastolic dysfunction as well as secondary pulmonary hypertension consistent with chronic cor pulmonale.  This could also represent an angina equivalent.  The " patient has multiple risk factors for coronary atherosclerosis.  - Stress Test With Myocardial Perfusion Two Day    5. Anginal equivalent  Shortness of breath with activity that's improved with rest is consistent with the diagnosis of angina pectoris.  - Stress Test With Myocardial Perfusion Two Day    ECG 12 Lead  Date/Time: 10/12/2017 3:36 PM  Performed by: ROBERT CANSECO  Authorized by: ROBERT CANSECO   Rhythm: sinus rhythm  Rate: normal  QRS axis: normal  Other findings: LVH with strain  Clinical impression: abnormal ECG             Plan:       I have recommended a vasodilator nuclear stress test.  The patient has been congratulated on his smoking cessation and cautioned to never again resumed cigarette smoking.  No changes in his medication profile have been made at today's visit.  Further recommendations will be predicated on the results of his outpatient testing.

## 2017-10-13 LAB
ALBUMIN SERPL-MCNC: 4.1 G/DL (ref 3.5–5)
ALBUMIN/GLOB SERPL: 1.5 G/DL (ref 1–2)
ALP SERPL-CCNC: 62 U/L (ref 38–126)
ALT SERPL-CCNC: 40 U/L (ref 13–69)
AST SERPL-CCNC: 20 U/L (ref 15–46)
BASOPHILS # BLD AUTO: 0.04 10*3/MM3 (ref 0–0.2)
BASOPHILS NFR BLD AUTO: 0.5 % (ref 0–2.5)
BILIRUB SERPL-MCNC: 0.4 MG/DL (ref 0.2–1.3)
BUN SERPL-MCNC: 23 MG/DL (ref 7–20)
BUN/CREAT SERPL: 25.6 (ref 6.3–21.9)
CALCIUM SERPL-MCNC: 9.9 MG/DL (ref 8.4–10.2)
CHLORIDE SERPL-SCNC: 104 MMOL/L (ref 98–107)
CO2 SERPL-SCNC: 26 MMOL/L (ref 26–30)
CREAT SERPL-MCNC: 0.9 MG/DL (ref 0.6–1.3)
EOSINOPHIL # BLD AUTO: 0.28 10*3/MM3 (ref 0–0.7)
EOSINOPHIL NFR BLD AUTO: 3.7 % (ref 0–7)
ERYTHROCYTE [DISTWIDTH] IN BLOOD BY AUTOMATED COUNT: 17.1 % (ref 11.5–14.5)
GLOBULIN SER CALC-MCNC: 2.8 GM/DL
GLUCOSE SERPL-MCNC: 120 MG/DL (ref 74–98)
HCT VFR BLD AUTO: 48.5 % (ref 42–52)
HGB BLD-MCNC: 15.5 G/DL (ref 14–18)
IMM GRANULOCYTES # BLD: 0.23 10*3/MM3 (ref 0–0.06)
IMM GRANULOCYTES NFR BLD: 3.1 % (ref 0–0.6)
LYMPHOCYTES # BLD AUTO: 2.29 10*3/MM3 (ref 0.6–3.4)
LYMPHOCYTES NFR BLD AUTO: 30.5 % (ref 10–50)
MCH RBC QN AUTO: 28.1 PG (ref 27–31)
MCHC RBC AUTO-ENTMCNC: 32 G/DL (ref 30–37)
MCV RBC AUTO: 87.9 FL (ref 80–94)
MONOCYTES # BLD AUTO: 0.66 10*3/MM3 (ref 0–0.9)
MONOCYTES NFR BLD AUTO: 8.8 % (ref 0–12)
NEUTROPHILS # BLD AUTO: 4 10*3/MM3 (ref 2–6.9)
NEUTROPHILS NFR BLD AUTO: 53.4 % (ref 37–80)
NRBC BLD AUTO-RTO: 0 /100 WBC (ref 0–0)
PATH REV BLD -IMP: NORMAL
PATHOLOGIST NAME: NORMAL
PLATELET # BLD AUTO: 266 10*3/MM3 (ref 130–400)
POTASSIUM SERPL-SCNC: 4.5 MMOL/L (ref 3.5–5.1)
PROT SERPL-MCNC: 6.9 G/DL (ref 6.3–8.2)
RBC # BLD AUTO: 5.52 10*6/MM3 (ref 4.7–6.1)
SODIUM SERPL-SCNC: 141 MMOL/L (ref 137–145)
WBC # BLD AUTO: 7.5 10*3/MM3 (ref 4.8–10.8)

## 2017-10-24 ENCOUNTER — HOSPITAL ENCOUNTER (OUTPATIENT)
Dept: GENERAL RADIOLOGY | Facility: HOSPITAL | Age: 42
Discharge: HOME | End: 2017-10-24
Attending: FAMILY MEDICINE | Admitting: FAMILY MEDICINE

## 2017-10-24 ENCOUNTER — OFFICE VISIT (OUTPATIENT)
Dept: INTERNAL MEDICINE | Facility: CLINIC | Age: 42
End: 2017-10-24

## 2017-10-24 VITALS
RESPIRATION RATE: 12 BRPM | TEMPERATURE: 97.7 F | HEART RATE: 89 BPM | BODY MASS INDEX: 31.34 KG/M2 | OXYGEN SATURATION: 96 % | SYSTOLIC BLOOD PRESSURE: 144 MMHG | WEIGHT: 195 LBS | DIASTOLIC BLOOD PRESSURE: 90 MMHG | HEIGHT: 66 IN

## 2017-10-24 DIAGNOSIS — R73.09 ELEVATED GLUCOSE: ICD-10-CM

## 2017-10-24 DIAGNOSIS — J18.9 COMMUNITY ACQUIRED PNEUMONIA OF LEFT UPPER LOBE OF LUNG: ICD-10-CM

## 2017-10-24 DIAGNOSIS — R10.84 GENERALIZED ABDOMINAL PAIN: ICD-10-CM

## 2017-10-24 DIAGNOSIS — R05.3 COUGH, PERSISTENT: Primary | ICD-10-CM

## 2017-10-24 DIAGNOSIS — R79.89 ABNORMAL CBC: ICD-10-CM

## 2017-10-24 DIAGNOSIS — K92.1 MELENA: ICD-10-CM

## 2017-10-24 DIAGNOSIS — R05.3 COUGH, PERSISTENT: ICD-10-CM

## 2017-10-24 LAB
BASOPHILS # BLD AUTO: 0.05 10*3/MM3 (ref 0–0.2)
BASOPHILS NFR BLD AUTO: 0.5 % (ref 0–2.5)
EOSINOPHIL # BLD AUTO: 0.11 10*3/MM3 (ref 0–0.7)
EOSINOPHIL NFR BLD AUTO: 1.1 % (ref 0–7)
ERYTHROCYTE [DISTWIDTH] IN BLOOD BY AUTOMATED COUNT: 16.5 % (ref 11.5–14.5)
FERRITIN SERPL-MCNC: 94.6 NG/ML (ref 17.9–464)
HBA1C MFR BLD: 5.3 %
HCT VFR BLD AUTO: 47.5 % (ref 42–52)
HGB BLD-MCNC: 15.5 G/DL (ref 14–18)
IMM GRANULOCYTES # BLD: 0.1 10*3/MM3 (ref 0–0.06)
IMM GRANULOCYTES NFR BLD: 1 % (ref 0–0.6)
IRON SATN MFR SERPL: 23 % (ref 11–46)
IRON SERPL-MCNC: 62 MCG/DL (ref 37–181)
LYMPHOCYTES # BLD AUTO: 2.87 10*3/MM3 (ref 0.6–3.4)
LYMPHOCYTES NFR BLD AUTO: 27.4 % (ref 10–50)
MCH RBC QN AUTO: 28.3 PG (ref 27–31)
MCHC RBC AUTO-ENTMCNC: 32.6 G/DL (ref 30–37)
MCV RBC AUTO: 86.7 FL (ref 80–94)
MONOCYTES # BLD AUTO: 0.77 10*3/MM3 (ref 0–0.9)
MONOCYTES NFR BLD AUTO: 7.4 % (ref 0–12)
NEUTROPHILS # BLD AUTO: 6.57 10*3/MM3 (ref 2–6.9)
NEUTROPHILS NFR BLD AUTO: 62.6 % (ref 37–80)
NRBC BLD AUTO-RTO: 0 /100 WBC (ref 0–0)
PLATELET # BLD AUTO: 212 10*3/MM3 (ref 130–400)
RBC # BLD AUTO: 5.48 10*6/MM3 (ref 4.7–6.1)
TIBC SERPL-MCNC: 264 MCG/DL (ref 261–497)
UIBC SERPL-MCNC: 202 MCG/DL
WBC # BLD AUTO: 10.47 10*3/MM3 (ref 4.8–10.8)

## 2017-10-24 PROCEDURE — 71020 HC CHEST PA AND LATERAL: CPT

## 2017-10-24 PROCEDURE — 99214 OFFICE O/P EST MOD 30 MIN: CPT | Performed by: FAMILY MEDICINE

## 2017-10-24 RX ORDER — LEVOFLOXACIN 750 MG/1
750 TABLET ORAL DAILY
Qty: 5 TABLET | Refills: 0 | Status: SHIPPED | OUTPATIENT
Start: 2017-10-24 | End: 2017-10-29

## 2017-10-25 ENCOUNTER — HOSPITAL ENCOUNTER (OUTPATIENT)
Dept: SLEEP MEDICINE | Facility: HOSPITAL | Age: 42
Setting detail: THERAPIES SERIES
End: 2017-10-25

## 2017-10-25 DIAGNOSIS — G47.33 OSA (OBSTRUCTIVE SLEEP APNEA): ICD-10-CM

## 2017-10-25 DIAGNOSIS — G47.19 EXCESSIVE DAYTIME SLEEPINESS: ICD-10-CM

## 2017-10-25 PROCEDURE — 95811 POLYSOM 6/>YRS CPAP 4/> PARM: CPT | Performed by: INTERNAL MEDICINE

## 2017-10-25 PROCEDURE — 95811 POLYSOM 6/>YRS CPAP 4/> PARM: CPT

## 2017-10-25 RX ORDER — NICOTINE 21 MG/24HR
1 PATCH, TRANSDERMAL 24 HOURS TRANSDERMAL EVERY 24 HOURS
Qty: 30 PATCH | Refills: 1 | Status: SHIPPED | OUTPATIENT
Start: 2017-10-25 | End: 2017-12-21

## 2017-10-25 NOTE — PROGRESS NOTES
Subjective    Tesfaye Ang is a 42 y.o. male here for:  Chief Complaint   Patient presents with   • Abnormal Lab     follow up on abnormal labs   • Cough     still coughing at night would like refill on cough med     History of Present Illness   Patient here to follow up on abnormal labs. At his last visit he was coughing, was s/p discharge from hospital for pneumonia. Feels he's possibly getting sick again. Wearing oxygen today. Cough seems to be worsening gradually. No fevers. He's scheduled to follow up with pulmonary. Has been out of hospital inpatient for about a month now. Cough syrup helped, he'd like to get that refilled. Having sleep disturbance due to cough.    He notes constant abdominal pain, which is essentially a baseline for him. He's not seen blood in his stool but he has seen dark color, essentially black. He's not had them in some time (years) but he's had EGD and colonoscopy in the past.    No history of diabetes mellitus but has been on steroids repetitively due to his lungs.    The following portions of the patient's history were reviewed and updated as appropriate: allergies, current medications, past family history, past medical history, past social history, past surgical history and problem list.    Review of Systems   Constitutional: Positive for activity change and fatigue.   Respiratory: Positive for cough and shortness of breath.    Gastrointestinal: Positive for abdominal pain.       Vitals:    10/24/17 1458   BP: 144/90   Pulse: 89   Resp: 12   Temp: 97.7 °F (36.5 °C)   SpO2: 96%       Objective   Physical Exam   Constitutional: He is oriented to person, place, and time. Vital signs are normal. He appears well-developed and well-nourished. He is active. He has a sickly appearance. He does not appear ill.   Appears stated age. Well groomed. Obese   HENT:   Head: Normocephalic and atraumatic. 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   Right Ear: Hearing normal.   Left Ear: Hearing  normal.   Nose: Nose normal.   Mouth/Throat: Abnormal dentition.   Eyes: EOM and lids are normal. Pupils are equal, round, and reactive to light. No scleral icterus.   Neck: Neck supple.   Slightly hoarse voice.   Cardiovascular: Normal rate, regular rhythm and normal heart sounds.    Pulmonary/Chest: Effort normal. No stridor. No respiratory distress. He has no decreased breath sounds. He has no wheezes. He has rhonchi in the left upper field. He has no rales.   Wearing nasal cannula oxygen   Neurological: He is alert and oriented to person, place, and time. He displays no tremor. No cranial nerve deficit. Gait (walks with cane) abnormal.   Skin: Skin is warm. He is not diaphoretic. No cyanosis. No pallor.   Psychiatric: He has a normal mood and affect. His speech is normal and behavior is normal. Judgment and thought content normal. Cognition and memory are normal.   Nursing note and vitals reviewed.      Assessment/Plan   Tesfaye was seen today for abnormal lab and cough.    Diagnoses and all orders for this visit:    Cough, persistent  -     XR Chest PA & Lateral; Future  -     HYDROcod Polst-CPM Polst ER (TUSSIONEX PENNKINETIC ER) 10-8 MG/5ML ER suspension; Take 5 mL by mouth Every 12 (Twelve) Hours As Needed for Cough.    Abnormal CBC  -     Iron Profile  -     Ferritin  -     CBC & Differential    Elevated glucose  -     Hemoglobin A1c    Generalized abdominal pain  -     Ambulatory referral for Screening EGD    Melena  -     Ambulatory referral for Screening EGD  -     Iron Profile  -     Ferritin  -     CBC & Differential    Community acquired pneumonia of left upper lobe of lung  -     levoFLOXacin (LEVAQUIN) 750 MG tablet; Take 1 tablet by mouth Daily for 5 days.      Discussed labs. Likely needs repeat endoscopy to look for an etiology for his anemia.          Karlee Thomas MD

## 2017-10-30 DIAGNOSIS — G47.33 OSA (OBSTRUCTIVE SLEEP APNEA): Primary | ICD-10-CM

## 2017-11-02 ENCOUNTER — HOSPITAL ENCOUNTER (OUTPATIENT)
Dept: NUCLEAR MEDICINE | Facility: HOSPITAL | Age: 42
Discharge: HOME | End: 2017-11-02
Attending: INTERNAL MEDICINE

## 2017-11-02 PROCEDURE — 78452 HT MUSCLE IMAGE SPECT MULT: CPT

## 2017-11-02 PROCEDURE — A9500 TC99M SESTAMIBI: HCPCS | Performed by: INTERNAL MEDICINE

## 2017-11-02 PROCEDURE — 25010000002 REGADENOSON 0.4 MG/5ML SOLUTION: Performed by: INTERNAL MEDICINE

## 2017-11-02 PROCEDURE — 93017 CV STRESS TEST TRACING ONLY: CPT

## 2017-11-02 PROCEDURE — 78452 HT MUSCLE IMAGE SPECT MULT: CPT | Performed by: INTERNAL MEDICINE

## 2017-11-02 PROCEDURE — 93018 CV STRESS TEST I&R ONLY: CPT | Performed by: INTERNAL MEDICINE

## 2017-11-02 PROCEDURE — 0 TECHNETIUM SESTAMIBI: Performed by: INTERNAL MEDICINE

## 2017-11-02 RX ADMIN — REGADENOSON 0.4 MG: 0.08 INJECTION, SOLUTION INTRAVENOUS at 07:10

## 2017-11-02 RX ADMIN — TECHNETIUM TC-99M SESTAMIBI 1 DOSE: 1 INJECTION INTRAVENOUS at 07:10

## 2017-11-03 ENCOUNTER — HOSPITAL ENCOUNTER (OUTPATIENT)
Dept: NUCLEAR MEDICINE | Facility: HOSPITAL | Age: 42
Discharge: HOME | End: 2017-11-03
Attending: INTERNAL MEDICINE

## 2017-11-03 PROCEDURE — A9500 TC99M SESTAMIBI: HCPCS | Performed by: INTERNAL MEDICINE

## 2017-11-03 PROCEDURE — 0 TECHNETIUM SESTAMIBI: Performed by: INTERNAL MEDICINE

## 2017-11-03 RX ADMIN — TECHNETIUM TC-99M SESTAMIBI 1 DOSE: 1 INJECTION INTRAVENOUS at 07:40

## 2017-11-06 LAB
BH CV NUCLEAR PRIOR STUDY: 3
BH CV STRESS COMMENTS STAGE 1: NORMAL
BH CV STRESS DOSE REGADENOSON STAGE 1: 0.4
BH CV STRESS DURATION MIN STAGE 1: 0
BH CV STRESS DURATION SEC STAGE 1: 15
BH CV STRESS PROTOCOL 1: NORMAL
BH CV STRESS RECOVERY BP: NORMAL MMHG
BH CV STRESS RECOVERY HR: 100 BPM
BH CV STRESS STAGE 1: 1
LV EF NUC BP: 58 %
MAXIMAL PREDICTED HEART RATE: 178 BPM
PERCENT MAX PREDICTED HR: 64.61 %
STRESS BASELINE BP: NORMAL MMHG
STRESS BASELINE HR: 85 BPM
STRESS PERCENT HR: 76 %
STRESS POST PEAK BP: NORMAL MMHG
STRESS POST PEAK HR: 115 BPM
STRESS TARGET HR: 151 BPM

## 2017-11-08 ENCOUNTER — TELEPHONE (OUTPATIENT)
Dept: SURGERY | Facility: CLINIC | Age: 42
End: 2017-11-08

## 2017-11-08 NOTE — TELEPHONE ENCOUNTER
Patient no showed for appointment. Tried to call patent and emergency contact got message that phone numbers are not working numbers. A no show letter being mailed to patient.

## 2017-11-29 ENCOUNTER — OFFICE VISIT (OUTPATIENT)
Dept: PULMONOLOGY | Facility: CLINIC | Age: 42
End: 2017-11-29

## 2017-11-29 VITALS
OXYGEN SATURATION: 97 % | DIASTOLIC BLOOD PRESSURE: 82 MMHG | SYSTOLIC BLOOD PRESSURE: 120 MMHG | HEART RATE: 81 BPM | RESPIRATION RATE: 14 BRPM | HEIGHT: 66 IN | BODY MASS INDEX: 31.34 KG/M2 | WEIGHT: 195 LBS

## 2017-11-29 DIAGNOSIS — F17.200 SMOKING: ICD-10-CM

## 2017-11-29 DIAGNOSIS — J44.9 CHRONIC OBSTRUCTIVE PULMONARY DISEASE, UNSPECIFIED COPD TYPE (HCC): ICD-10-CM

## 2017-11-29 DIAGNOSIS — R09.02 HYPOXIA: ICD-10-CM

## 2017-11-29 DIAGNOSIS — R06.02 SHORTNESS OF BREATH: ICD-10-CM

## 2017-11-29 DIAGNOSIS — G47.33 OSA (OBSTRUCTIVE SLEEP APNEA): Primary | ICD-10-CM

## 2017-11-29 PROCEDURE — 99214 OFFICE O/P EST MOD 30 MIN: CPT | Performed by: NURSE PRACTITIONER

## 2017-11-29 PROCEDURE — 94726 PLETHYSMOGRAPHY LUNG VOLUMES: CPT | Performed by: INTERNAL MEDICINE

## 2017-11-29 PROCEDURE — 94729 DIFFUSING CAPACITY: CPT | Performed by: INTERNAL MEDICINE

## 2017-11-29 PROCEDURE — 94060 EVALUATION OF WHEEZING: CPT | Performed by: INTERNAL MEDICINE

## 2017-11-30 ENCOUNTER — OFFICE VISIT (OUTPATIENT)
Dept: CARDIOLOGY | Facility: CLINIC | Age: 42
End: 2017-11-30

## 2017-11-30 VITALS
BODY MASS INDEX: 32.47 KG/M2 | OXYGEN SATURATION: 100 % | HEART RATE: 88 BPM | HEIGHT: 66 IN | WEIGHT: 202 LBS | SYSTOLIC BLOOD PRESSURE: 160 MMHG | DIASTOLIC BLOOD PRESSURE: 102 MMHG

## 2017-11-30 DIAGNOSIS — J43.2 CENTRILOBULAR EMPHYSEMA (HCC): ICD-10-CM

## 2017-11-30 DIAGNOSIS — I20.8 ANGINAL EQUIVALENT (HCC): Primary | ICD-10-CM

## 2017-11-30 DIAGNOSIS — R06.02 SOB (SHORTNESS OF BREATH): ICD-10-CM

## 2017-11-30 PROCEDURE — 99214 OFFICE O/P EST MOD 30 MIN: CPT | Performed by: INTERNAL MEDICINE
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
## 2017-12-01 ENCOUNTER — TELEPHONE (OUTPATIENT)
Dept: PULMONOLOGY | Facility: CLINIC | Age: 42
End: 2017-12-01

## 2017-12-21 ENCOUNTER — OFFICE VISIT (OUTPATIENT)
Dept: INTERNAL MEDICINE | Facility: CLINIC | Age: 42
End: 2017-12-21

## 2017-12-21 VITALS
HEART RATE: 80 BPM | SYSTOLIC BLOOD PRESSURE: 128 MMHG | WEIGHT: 208 LBS | TEMPERATURE: 97 F | OXYGEN SATURATION: 95 % | RESPIRATION RATE: 12 BRPM | BODY MASS INDEX: 33.43 KG/M2 | HEIGHT: 66 IN | DIASTOLIC BLOOD PRESSURE: 80 MMHG

## 2017-12-21 DIAGNOSIS — R56.9 SEIZURES (HCC): ICD-10-CM

## 2017-12-21 DIAGNOSIS — I25.10 CORONARY ARTERY DISEASE INVOLVING NATIVE CORONARY ARTERY OF NATIVE HEART WITHOUT ANGINA PECTORIS: ICD-10-CM

## 2017-12-21 DIAGNOSIS — G89.4 CHRONIC PAIN SYNDROME: ICD-10-CM

## 2017-12-21 DIAGNOSIS — J43.2 CENTRILOBULAR EMPHYSEMA (HCC): ICD-10-CM

## 2017-12-21 DIAGNOSIS — Z86.73 HISTORY OF TIA (TRANSIENT ISCHEMIC ATTACK): ICD-10-CM

## 2017-12-21 DIAGNOSIS — I10 ESSENTIAL HYPERTENSION: Primary | Chronic | ICD-10-CM

## 2017-12-21 DIAGNOSIS — I25.2 HISTORY OF MYOCARDIAL INFARCTION: Chronic | ICD-10-CM

## 2017-12-21 DIAGNOSIS — Z86.79 HISTORY OF HEART FAILURE: ICD-10-CM

## 2017-12-21 DIAGNOSIS — M79.2 NEUROPATHIC PAIN: ICD-10-CM

## 2017-12-21 DIAGNOSIS — J96.11 CHRONIC RESPIRATORY FAILURE WITH HYPOXIA (HCC): Chronic | ICD-10-CM

## 2017-12-21 PROCEDURE — 99213 OFFICE O/P EST LOW 20 MIN: CPT | Performed by: FAMILY MEDICINE

## 2017-12-24 PROBLEM — Z86.73 HISTORY OF TIA (TRANSIENT ISCHEMIC ATTACK): Status: ACTIVE | Noted: 2017-12-24

## 2017-12-24 PROBLEM — J18.9 PNEUMONIA OF BOTH UPPER LOBES DUE TO INFECTIOUS ORGANISM: Status: RESOLVED | Noted: 2017-09-09 | Resolved: 2017-12-24

## 2017-12-24 PROBLEM — Z86.79 HISTORY OF HEART FAILURE: Status: ACTIVE | Noted: 2017-12-24

## 2017-12-24 PROBLEM — I10 ESSENTIAL HYPERTENSION: Chronic | Status: ACTIVE | Noted: 2017-09-09

## 2017-12-24 NOTE — PROGRESS NOTES
Subjective    Tesfaye Ang is a 42 y.o. male here for:  Chief Complaint   Patient presents with   • COPD   • Hypertension     History of Present Illness     Patient comes in to follow up today. Continues to feel tired with widespread pain and shortness of breath. Reliant on oxygen 24-7. Has follow up with pulmonary scheduled. He is using inhalers as directed. He is taking coreg, amlodipine, lisinopril as directed for his essential hypertension which his controlled. follows up with cardiology for his coronary artery disease. S/p TIA. Has chronic pain, has not tolerated gabapentin due to possible seizure from it. Goes to pain clinic and is on chronic narcotic therapy.    The following portions of the patient's history were reviewed and updated as appropriate: allergies, current medications, past family history, past medical history, past social history, past surgical history and problem list.    Review of Systems   Constitutional: Positive for fatigue.   Respiratory: Positive for shortness of breath and wheezing.    Musculoskeletal: Positive for arthralgias, back pain, gait problem and myalgias.   Neurological: Positive for weakness.       Vitals:    12/21/17 0815   BP: 128/80   Pulse: 80   Resp: 12   Temp: 97 °F (36.1 °C)   SpO2: 95%         Objective   Physical Exam   Constitutional: He is oriented to person, place, and time. Vital signs are normal. He appears well-developed and well-nourished. He is active. He has a sickly appearance. He does not appear ill.   Appears stated age. Well groomed. Obese   HENT:   Head: Normocephalic and atraumatic. 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   Right Ear: Hearing normal.   Left Ear: Hearing normal.   Nose: Nose normal.   Mouth/Throat: Abnormal dentition.   Eyes: EOM and lids are normal. Pupils are equal, round, and reactive to light. No scleral icterus.   Neck: Neck supple.   Slightly hoarse voice.   Cardiovascular: Normal rate, regular rhythm and normal heart  sounds.    Pulmonary/Chest: No accessory muscle usage or stridor. No respiratory distress. He has no decreased breath sounds. He has no wheezes. He has no rhonchi. He has no rales.   Wearing nasal cannula oxygen   Neurological: He is alert and oriented to person, place, and time. He displays no tremor. No cranial nerve deficit. Gait (walks with cane) abnormal.   Skin: Skin is warm. He is not diaphoretic. No cyanosis. No pallor.   Psychiatric: He has a normal mood and affect. His speech is normal and behavior is normal. Judgment and thought content normal. Cognition and memory are normal.   Nursing note and vitals reviewed.          Assessment/Plan       Tesfaye was seen today for copd and hypertension.    Diagnoses and all orders for this visit:    Essential hypertension  Comments:  Continue amlodipine, coreg, lisinopril.    Chronic respiratory failure with hypoxia  Comments:  Continue supplemental oxygen continuously, follow up with pulmonary.    Centrilobular emphysema  Comments:  Continue inhalers, follow up with pulmonary.    Neuropathic pain    Chronic pain syndrome  Comments:  Follow up with pain management.    Coronary artery disease involving native coronary artery of native heart without angina pectoris    History of myocardial infarction    History of TIA (transient ischemic attack)    History of heart failure    Seizures  Comments:  avoid gabapentin        ·       Karlee Thomas MD    Please note that portions of this note were completed with a voice recognition program. Efforts were made to edit dictation, but occasionally words are mistranscribed.

## 2018-01-05 ENCOUNTER — TELEPHONE (OUTPATIENT)
Dept: INTERNAL MEDICINE | Facility: CLINIC | Age: 43
End: 2018-01-05

## 2018-01-05 NOTE — TELEPHONE ENCOUNTER
PATIENT CALLED UNABLE TO STOP COUGHING STATING THAT HE WOULD LIKE TO SEE DR GARCIA TODAY FOR POSSIBLE BRONCHITIS, PATIENT WAS OFFERED APPT WITH PA AND STATED HE WOULD RATHER SEE DR GARCIA.

## 2018-01-05 NOTE — TELEPHONE ENCOUNTER
Unfortunately I will not be able to see him. Can try [facility] or [facility] but with his health conditions I do recommend he see somebody.

## 2018-01-09 ENCOUNTER — OFFICE VISIT (OUTPATIENT)
Dept: INTERNAL MEDICINE | Facility: CLINIC | Age: 43
End: 2018-01-09

## 2018-01-09 VITALS
TEMPERATURE: 97.3 F | HEART RATE: 91 BPM | OXYGEN SATURATION: 98 % | RESPIRATION RATE: 14 BRPM | SYSTOLIC BLOOD PRESSURE: 134 MMHG | BODY MASS INDEX: 32.3 KG/M2 | HEIGHT: 66 IN | DIASTOLIC BLOOD PRESSURE: 80 MMHG | WEIGHT: 201 LBS

## 2018-01-09 DIAGNOSIS — J44.1 COPD EXACERBATION (HCC): Primary | ICD-10-CM

## 2018-01-09 PROCEDURE — 99213 OFFICE O/P EST LOW 20 MIN: CPT | Performed by: FAMILY MEDICINE

## 2018-01-09 RX ORDER — DOXYCYCLINE HYCLATE 100 MG/1
100 CAPSULE ORAL 2 TIMES DAILY
Qty: 14 CAPSULE | Refills: 0 | Status: SHIPPED | OUTPATIENT
Start: 2018-01-09 | End: 2018-01-16

## 2018-01-09 RX ORDER — PREDNISONE 20 MG/1
TABLET ORAL
Qty: 30 TABLET | Refills: 0 | Status: SHIPPED | OUTPATIENT
Start: 2018-01-09 | End: 2018-02-13

## 2018-01-09 NOTE — PROGRESS NOTES
Subjective    Tesfaye Ang is a 42 y.o. male here for:  Chief Complaint   Patient presents with   • Cough     History of Present Illness     Thinks he may have bronchitis. Has a cough, shortness of breath. Cough is non-productive. Has been sick almost a week. No measured fevers. Has severe COPD and is oxygen dependent. Has used tussionex previously and worked well for cough. Followed by pulmonary.    The following portions of the patient's history were reviewed and updated as appropriate: allergies, current medications, past family history, past medical history, past social history, past surgical history and problem list.    Review of Systems   Constitutional: Positive for activity change and fatigue.   Respiratory: Positive for cough, chest tightness and shortness of breath.        Vitals:    01/09/18 1113   BP: 134/80   Pulse: 91   Resp: 14   Temp: 97.3 °F (36.3 °C)   SpO2: 98%         Objective   Physical Exam   Constitutional: He is oriented to person, place, and time. Vital signs are normal. He appears well-developed and well-nourished. He is active. He has a sickly appearance. He appears ill.   Appears stated age. Well groomed. Obese   HENT:   Head: Normocephalic and atraumatic. 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   Right Ear: Hearing normal.   Left Ear: Hearing normal.   Nose: Nose normal.   Mouth/Throat: Abnormal dentition.   Eyes: EOM and lids are normal. Pupils are equal, round, and reactive to light. No scleral icterus.   Neck: Neck supple.   Slightly hoarse voice.   Cardiovascular: Normal rate, regular rhythm and normal heart sounds.    Pulmonary/Chest: No accessory muscle usage or stridor. No respiratory distress. He has no decreased breath sounds. He has wheezes. He has no rhonchi. He has no rales.   Wearing nasal cannula oxygen; deep hacking cough.   Neurological: He is alert and oriented to person, place, and time. He displays no tremor. No cranial nerve deficit. Gait (walks with cane)  abnormal.   Skin: Skin is warm. He is not diaphoretic. No cyanosis. No pallor.   Psychiatric: He has a normal mood and affect. His speech is normal and behavior is normal. Judgment and thought content normal. Cognition and memory are normal.   Nursing note and vitals reviewed.          Assessment/Plan       Tesfaye was seen today for cough.    Diagnoses and all orders for this visit:    COPD exacerbation  -     predniSONE (DELTASONE) 20 MG tablet; TAKE 4 TAB PO QDAY X 3 DAYS, 3 TAB PO QDAY X 3 DAYS, 2 TAB PO QDAY X 3 DAYS, 1 TAB PO QDAY X 2 DAYS, 1/2 TAB PO QDAY X 2 DAYS.  -     doxycycline (VIBRAMYCIN) 100 MG capsule; Take 1 capsule by mouth 2 (Two) Times a Day for 7 days.  -     HYDROcod Polst-CPM Polst ER (TUSSIONEX PENNKINETIC ER) 10-8 MG/5ML ER suspension; Take 5 mL by mouth Every 12 (Twelve) Hours As Needed for Cough.        · Discussed narcotic cough syrup, warned of addiction potential, constipation, sedation. Use at night mainly to help with sleep.  · ER for worsening in breathing. He states understanding.      Karlee Thomas MD    Please note that portions of this note were completed with a voice recognition program. Efforts were made to edit dictation, but occasionally words are mistranscribed.

## 2018-01-25 NOTE — PROGRESS NOTES
Patient is a 41-year-old male.  Worsening hypoxemia.  Patient being treated for bilateral pneumonia.  On 100% nonrebreather saturations are approximately 80%.  Respiratory rate is 28.  No use of accessory expiratory muscles.  Lungs show bibasilar crackles and end-expiratory wheeze.  Heart little tachycardic sinus tachycardia at 120.  We'll move to ICU with BiPAP 12/8100% O2.  Add steroids given his wheezing continue nebulizers.  Continue azithromycin and Rocephin.   Retinal tear and detachment warning symptoms reviewed and patient instructed to call immediately if increasing floaters, flashes, or decreasing peripheral vision.

## 2018-02-13 ENCOUNTER — OFFICE VISIT (OUTPATIENT)
Dept: INTERNAL MEDICINE | Facility: CLINIC | Age: 43
End: 2018-02-13

## 2018-02-13 VITALS
BODY MASS INDEX: 32.95 KG/M2 | SYSTOLIC BLOOD PRESSURE: 124 MMHG | TEMPERATURE: 97.2 F | DIASTOLIC BLOOD PRESSURE: 80 MMHG | WEIGHT: 205 LBS | HEIGHT: 66 IN | OXYGEN SATURATION: 99 % | HEART RATE: 74 BPM | RESPIRATION RATE: 14 BRPM

## 2018-02-13 DIAGNOSIS — J43.2 CENTRILOBULAR EMPHYSEMA (HCC): Primary | ICD-10-CM

## 2018-02-13 DIAGNOSIS — Z99.81 SUPPLEMENTAL OXYGEN DEPENDENT: ICD-10-CM

## 2018-02-13 PROCEDURE — 99213 OFFICE O/P EST LOW 20 MIN: CPT | Performed by: FAMILY MEDICINE

## 2018-02-13 RX ORDER — ROFLUMILAST 500 UG/1
500 TABLET ORAL DAILY
Qty: 7 TABLET | Refills: 0 | Status: SHIPPED | OUTPATIENT
Start: 2018-02-13 | End: 2018-02-22 | Stop reason: SINTOL
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
## 2018-02-15 PROBLEM — R06.02 SOB (SHORTNESS OF BREATH): Status: RESOLVED | Noted: 2017-10-12 | Resolved: 2018-02-15

## 2018-02-21 ENCOUNTER — OFFICE VISIT (OUTPATIENT)
Dept: INTERNAL MEDICINE | Facility: CLINIC | Age: 43
End: 2018-02-21

## 2018-02-21 VITALS
HEIGHT: 66 IN | HEART RATE: 74 BPM | SYSTOLIC BLOOD PRESSURE: 122 MMHG | DIASTOLIC BLOOD PRESSURE: 80 MMHG | WEIGHT: 201 LBS | OXYGEN SATURATION: 99 % | BODY MASS INDEX: 32.3 KG/M2 | TEMPERATURE: 97.6 F | RESPIRATION RATE: 14 BRPM

## 2018-02-21 DIAGNOSIS — J43.2 CENTRILOBULAR EMPHYSEMA (HCC): Primary | ICD-10-CM

## 2018-02-21 DIAGNOSIS — I10 ESSENTIAL HYPERTENSION: Chronic | ICD-10-CM

## 2018-02-21 DIAGNOSIS — Z99.81 SUPPLEMENTAL OXYGEN DEPENDENT: ICD-10-CM

## 2018-02-21 PROCEDURE — 99213 OFFICE O/P EST LOW 20 MIN: CPT | Performed by: FAMILY MEDICINE

## 2018-02-21 RX ORDER — BUDESONIDE AND FORMOTEROL FUMARATE DIHYDRATE 160; 4.5 UG/1; UG/1
2 AEROSOL RESPIRATORY (INHALATION)
Qty: 4 INHALER | Refills: 0 | COMMUNITY
Start: 2018-02-21 | End: 2018-03-07

## 2018-02-21 NOTE — PROGRESS NOTES
Subjective    Tesfaye Ang is a 42 y.o. male here for:  Chief Complaint   Patient presents with   • COPD   • Hypertension     History of Present Illness     [place] Daliresp worsened breathing. Does not desire to continue. Former smoker, oxygen dependent continuously. Scheduled for pulmonary follow up. Would like to continue Spiriva and Symbicort.     Hypertension remains controlled. He has a history of coronary artery disease and CVA.     The following portions of the patient's history were reviewed and updated as appropriate: allergies, current medications, past family history, past medical history, past social history, past surgical history and problem list.    Review of Systems   Constitutional: Positive for fatigue.   Respiratory: Positive for shortness of breath.    Musculoskeletal: Positive for arthralgias and gait problem.   Neurological: Positive for weakness and numbness.       Vitals:    02/21/18 1005   BP: 122/80   Pulse: 74   Resp: 14   Temp: 97.6 °F (36.4 °C)   SpO2: 99%         Objective   Physical Exam   Constitutional: He is oriented to person, place, and time. Vital signs are normal. He appears well-developed and well-nourished. He is active. He has a sickly appearance. He appears ill.   Appears stated age. Well groomed. Obese. Looks tired.   HENT:   Head: Normocephalic and atraumatic. 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   Right Ear: Hearing normal.   Left Ear: Hearing normal.   Nose: Nose normal.   Mouth/Throat: Abnormal dentition.   Eyes: EOM and lids are normal. Pupils are equal, round, and reactive to light. No scleral icterus.   Neck: Neck supple.   Slightly hoarse voice.   Cardiovascular: Normal rate and regular rhythm.  Exam reveals distant heart sounds.    Pulmonary/Chest: Effort normal. No accessory muscle usage or stridor. No respiratory distress. He has no decreased breath sounds. He has no wheezes. He has no rhonchi. He has no rales.   Wearing nasal cannula oxygen.     Neurological: He is alert and oriented to person, place, and time. He displays no tremor. No cranial nerve deficit. Gait (walks with cane) abnormal.   Skin: Skin is warm. He is not diaphoretic. No cyanosis. No pallor.   Psychiatric: His speech is normal and behavior is normal. Judgment and thought content normal. Cognition and memory are normal. He exhibits a depressed mood.   Nursing note and vitals reviewed.          Assessment/Plan       Tesfaye was seen today for copd and hypertension.    Diagnoses and all orders for this visit:    Centrilobular emphysema  -     Tiotropium Bromide Monohydrate (SPIRIVA RESPIMAT) 1.25 MCG/ACT aerosol solution inhaler; Inhale 2 puffs Daily.  -     Tiotropium Bromide Monohydrate (SPIRIVA RESPIMAT) 2.5 MCG/ACT aerosol solution; Inhale 2 puffs Daily.  -     budesonide-formoterol (SYMBICORT) 160-4.5 MCG/ACT inhaler; Inhale 2 puffs 2 (Two) Times a Day.    Supplemental oxygen dependent    Essential hypertension  Comments:  Controlled. Continue amlodipine, coreg, clonidine, lisinoprul.         · See Dr. Oconnor as scheduled.    Return in about 2 months (around 4/21/2018) for Medicare Wellness, Follow up on current issues.    Karlee Thomas MD    Please note that portions of this note may have been completed with a voice recognition program. Efforts were made to edit dictation, but occasionally words are mistranscribed.

## 2018-03-07 ENCOUNTER — OFFICE VISIT (OUTPATIENT)
Dept: PULMONOLOGY | Facility: CLINIC | Age: 43
End: 2018-03-07

## 2018-03-07 VITALS
BODY MASS INDEX: 34.55 KG/M2 | OXYGEN SATURATION: 96 % | HEIGHT: 66 IN | WEIGHT: 215 LBS | HEART RATE: 75 BPM | DIASTOLIC BLOOD PRESSURE: 90 MMHG | RESPIRATION RATE: 17 BRPM | SYSTOLIC BLOOD PRESSURE: 160 MMHG

## 2018-03-07 DIAGNOSIS — J44.9 CHRONIC OBSTRUCTIVE PULMONARY DISEASE, UNSPECIFIED COPD TYPE (HCC): ICD-10-CM

## 2018-03-07 DIAGNOSIS — R09.02 HYPOXIA: ICD-10-CM

## 2018-03-07 DIAGNOSIS — F17.200 SMOKING: ICD-10-CM

## 2018-03-07 DIAGNOSIS — B37.0 ORAL CANDIDA: ICD-10-CM

## 2018-03-07 DIAGNOSIS — R06.02 SHORTNESS OF BREATH: Primary | ICD-10-CM

## 2018-03-07 DIAGNOSIS — G47.33 OSA (OBSTRUCTIVE SLEEP APNEA): ICD-10-CM

## 2018-03-07 DIAGNOSIS — R91.1 LUNG NODULE, SOLITARY: ICD-10-CM

## 2018-03-07 PROCEDURE — 99214 OFFICE O/P EST MOD 30 MIN: CPT | Performed by: INTERNAL MEDICINE

## 2018-03-07 NOTE — PROGRESS NOTES
"Chief Complaint   Patient presents with   • Follow-up         Subjective   Tesfaye Ang is a 42 y.o. male.     History of Present Illness   Patient comes today for follow up of shortness of breath, obstructive sleep apnea and COPD, as well as hypoxemia.     Symptoms have been stable since the last clinic visit. Patient reports no recent exacerbations.     Patient is using medications, as prescribed. Exercise tolerance has also remained stable.     Patient is also complaining of hoarseness as well as sore throat and change in taste. Patient also notes burning sensation on the tongue for the past few days    He is using BiPAP regularly at a pressure of 18/11 with a full facemask.  He feels well rested in the morning.    Continues to smoke 1 pack per day.    The following portions of the patient's history were reviewed and updated as appropriate: allergies, current medications, past family history, past medical history, past social history and past surgical history.    Review of Systems   Constitutional: Positive for chills, fatigue and fever.   HENT: Negative for sinus pressure, sneezing and sore throat.    Respiratory: Positive for shortness of breath. Negative for cough and wheezing.    Psychiatric/Behavioral: Positive for sleep disturbance.       Objective   Visit Vitals  /90   Pulse 75   Resp 17   Ht 167.6 cm (65.98\")   Wt 97.5 kg (215 lb)   SpO2 96% Comment: ROOM AIR   BMI 34.72 kg/m²       Physical Exam   Constitutional: He is oriented to person, place, and time. He appears well-developed.   HENT:   Head: Normocephalic and atraumatic.   Crowded oropharynx.  Tongue showed erythema as well as white layer.  Oropharynx was erythematous with scattered white layer   Eyes: EOM are normal.   Neck: Neck supple.   Cardiovascular: Normal rate and regular rhythm.    Pulmonary/Chest: Effort normal. No respiratory distress.   Somewhat hyperresonant to percussion  Somewhat decreased A/E with scattered wheezing " noted.   Musculoskeletal: He exhibits no edema.   Used a cane.   Neurological: He is alert and oriented to person, place, and time.   Skin: Skin is warm and dry.   Psychiatric: He has a normal mood and affect.   Vitals reviewed.      Assessment/Plan   Tesfaye was seen today for follow-up.    Diagnoses and all orders for this visit:    Shortness of breath  -     CT Chest Without Contrast; Future    Chronic obstructive pulmonary disease, unspecified COPD type  -     CT Chest Without Contrast; Future    ALTAGRACIA (obstructive sleep apnea)    Hypoxia    Smoking    Lung nodule, solitary  -     CT Chest Without Contrast; Future    Oral candida    Other orders  -     nystatin (MYCOSTATIN) 602134 UNIT/ML suspension; Take 5 mL by mouth 4 (Four) Times a Day.  -     Fluticasone-Umeclidin-Vilant (TRELEGY ELLIPTA) 100-62.5-25 MCG/INH aerosol powder ; Inhale 1 each Daily. Rinse mouth with water after use.           Return in about 4 months (around 7/7/2018) for Recheck, For Cara.    DISCUSSION (if any):  I reviewed the patient's last CT scan, from September 2017, which showed 8.5 mm lung nodule.    I explained to the patient and his family member that due to the size of the nodule that we need to be repeated.  If the next CT scan shows stable findings, then we can perform a CT in 8 months from the next one.    Since the patient is having trouble affording his medications due to high copayments, I have given him prescription for combination inhaled corticosteroid, long-acting beta agonist and long-acting muscarinic agent.    For symptoms of oral candidiasis, the patient will be prescribed nystatin swish and swallow.    Patient was instructed to make sure to rinse mouth with water after using steroid-based inhaler    Patient was offered modalities such as Chantix/nicotine patches/Wellbutrin to aid in smoking cessation.    The patient will get back to us regarding the choice, once a decision has been taken.  Side effects were discussed  in great detail.    Patient was given reading material.    He was advised to continue BiPAP 18/11 with a full facemask.    The patient was advised to continue oxygen, since there has been a good response since the patient is using it as prescribed.      Dictated utilizing Dragon dictation.    This document was electronically signed by Elvis Oconnor MD March 12, 2018  1:58 PM

## 2018-04-10 ENCOUNTER — TELEPHONE (OUTPATIENT)
Dept: PULMONOLOGY | Facility: CLINIC | Age: 43
End: 2018-04-10

## 2018-04-10 NOTE — TELEPHONE ENCOUNTER
Pt was called about his CT scan that he did not due to his Insurance this was canceled per pt. He has no Insurance at this time, I sent him a [employer] Application Packet. Pt states he has refilled for his disability insurance he will call our office when he get's his insurance so we can schedule him for Ct.

## 2018-04-21 ENCOUNTER — HOSPITAL ENCOUNTER (EMERGENCY)
Facility: HOSPITAL | Age: 43
Discharge: HOME | End: 2018-04-21
Attending: EMERGENCY MEDICINE | Admitting: EMERGENCY MEDICINE

## 2018-04-21 ENCOUNTER — TELEPHONE (OUTPATIENT)
Dept: INTERNAL MEDICINE | Facility: CLINIC | Age: 43
End: 2018-04-21

## 2018-04-21 VITALS
BODY MASS INDEX: 32.11 KG/M2 | WEIGHT: 199.8 LBS | TEMPERATURE: 98.2 F | RESPIRATION RATE: 18 BRPM | OXYGEN SATURATION: 95 % | HEIGHT: 66 IN | HEART RATE: 82 BPM | DIASTOLIC BLOOD PRESSURE: 111 MMHG | SYSTOLIC BLOOD PRESSURE: 166 MMHG

## 2018-04-21 DIAGNOSIS — R20.2 PARESTHESIA OF LEFT UPPER EXTREMITY: Primary | ICD-10-CM

## 2018-04-21 DIAGNOSIS — R52 PAIN: ICD-10-CM

## 2018-04-21 PROCEDURE — 99283 EMERGENCY DEPT VISIT LOW MDM: CPT

## 2018-04-21 RX ORDER — HYDROCODONE BITARTRATE AND ACETAMINOPHEN 5; 325 MG/1; MG/1
1 TABLET ORAL EVERY 4 HOURS PRN
Qty: 6 TABLET | Refills: 0 | Status: SHIPPED | OUTPATIENT
Start: 2018-04-21 | End: 2018-04-23

## 2018-04-21 RX ORDER — OXYCODONE AND ACETAMINOPHEN 10; 325 MG/1; MG/1
1 TABLET ORAL ONCE
Status: COMPLETED | OUTPATIENT
Start: 2018-04-21 | End: 2018-04-21

## 2018-04-21 RX ADMIN — OXYCODONE HYDROCHLORIDE AND ACETAMINOPHEN 1 TABLET: 10; 325 TABLET ORAL at 21:59
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
## 2018-04-23 ENCOUNTER — TELEPHONE (OUTPATIENT)
Dept: INTERNAL MEDICINE | Facility: CLINIC | Age: 43
End: 2018-04-23

## 2018-04-23 PROBLEM — I67.9 CEREBROVASCULAR DISEASE: Status: ACTIVE | Noted: 2018-04-23

## 2018-04-23 RX ORDER — HYDROCODONE BITARTRATE AND ACETAMINOPHEN 7.5; 325 MG/1; MG/1
1 TABLET ORAL EVERY 6 HOURS PRN
Qty: 12 TABLET | Refills: 0 | Status: SHIPPED | OUTPATIENT
Start: 2018-04-23 | End: 2018-04-26
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
## 2018-04-24 ENCOUNTER — OFFICE VISIT (OUTPATIENT)
Dept: ORTHOPEDIC SURGERY | Facility: CLINIC | Age: 43
End: 2018-04-24

## 2018-04-24 VITALS — RESPIRATION RATE: 18 BRPM | HEIGHT: 66 IN | WEIGHT: 200 LBS | BODY MASS INDEX: 32.14 KG/M2

## 2018-04-24 DIAGNOSIS — S52.552A OTHER CLOSED EXTRA-ARTICULAR FRACTURE OF DISTAL END OF LEFT RADIUS, INITIAL ENCOUNTER: ICD-10-CM

## 2018-04-24 DIAGNOSIS — R09.02 HYPOXIA: ICD-10-CM

## 2018-04-24 DIAGNOSIS — M25.532 ARTHRALGIA OF LEFT WRIST: Primary | ICD-10-CM

## 2018-04-24 DIAGNOSIS — M25.552 ARTHRALGIA OF LEFT HIP: ICD-10-CM

## 2018-04-24 PROCEDURE — 99204 OFFICE O/P NEW MOD 45 MIN: CPT | Performed by: ORTHOPAEDIC SURGERY

## 2018-04-24 PROCEDURE — 29075 APPL CST ELBW FNGR SHORT ARM: CPT | Performed by: ORTHOPAEDIC SURGERY
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
## 2018-04-25 ENCOUNTER — OFFICE VISIT (OUTPATIENT)
Dept: ORTHOPEDIC SURGERY | Facility: CLINIC | Age: 43
End: 2018-04-25

## 2018-04-25 VITALS — RESPIRATION RATE: 18 BRPM | HEIGHT: 66 IN | BODY MASS INDEX: 32.47 KG/M2 | WEIGHT: 202 LBS

## 2018-04-25 DIAGNOSIS — S52.552A OTHER CLOSED EXTRA-ARTICULAR FRACTURE OF DISTAL END OF LEFT RADIUS, INITIAL ENCOUNTER: Primary | ICD-10-CM

## 2018-04-25 DIAGNOSIS — Z09 FOLLOW UP: Primary | ICD-10-CM

## 2018-04-25 PROCEDURE — 99213 OFFICE O/P EST LOW 20 MIN: CPT | Performed by: PHYSICIAN ASSISTANT

## 2018-04-25 PROCEDURE — 29075 APPL CST ELBW FNGR SHORT ARM: CPT | Performed by: PHYSICIAN ASSISTANT

## 2018-04-25 RX ORDER — HYDROCODONE BITARTRATE AND ACETAMINOPHEN 5; 325 MG/1; MG/1
1 TABLET ORAL EVERY 12 HOURS PRN
Qty: 12 TABLET | Refills: 0 | Status: SHIPPED | OUTPATIENT
Start: 2018-04-25 | End: 2018-05-07
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
## 2018-04-30 DIAGNOSIS — S52.552A OTHER CLOSED EXTRA-ARTICULAR FRACTURE OF DISTAL END OF LEFT RADIUS, INITIAL ENCOUNTER: Primary | ICD-10-CM

## 2018-05-03 ENCOUNTER — OFFICE VISIT (OUTPATIENT)
Dept: ORTHOPEDIC SURGERY | Facility: CLINIC | Age: 43
End: 2018-05-03

## 2018-05-03 VITALS — WEIGHT: 202 LBS | BODY MASS INDEX: 32.47 KG/M2 | HEIGHT: 66 IN | RESPIRATION RATE: 18 BRPM

## 2018-05-03 DIAGNOSIS — S52.552D OTHER CLOSED EXTRA-ARTICULAR FRACTURE OF DISTAL END OF LEFT RADIUS WITH ROUTINE HEALING, SUBSEQUENT ENCOUNTER: Primary | ICD-10-CM

## 2018-05-03 PROCEDURE — 99213 OFFICE O/P EST LOW 20 MIN: CPT | Performed by: ORTHOPAEDIC SURGERY

## 2018-05-03 RX ORDER — LISINOPRIL 20 MG/1
TABLET ORAL
COMMUNITY
Start: 2018-02-27 | End: 2018-06-19 | Stop reason: SDUPTHER

## 2018-05-03 RX ORDER — CARVEDILOL 25 MG/1
TABLET ORAL
COMMUNITY
Start: 2018-02-27 | End: 2018-06-19 | Stop reason: SDUPTHER

## 2018-05-03 NOTE — PROGRESS NOTES
Subjective   Patient ID: Tesfaye Ang is a 42 y.o. male  Follow-up and Pain of the Left Wrist (Patient is here to today to check his wrist and cast for loosening and shifting. He states he is still in some pain and his arm swells at night time.)             History of Present Illness    Still has pain in his left wrist is been compliant with instructions to not lift no new injuries no elbow shoulder pain numbness or tingling a cast fits well.  No changes medical condition he still oxygen dependent.    Review of Systems   Constitutional: Negative for fever.   HENT: Negative for voice change.    Eyes: Negative for visual disturbance.   Respiratory: Negative for shortness of breath.    Cardiovascular: Negative for chest pain.   Gastrointestinal: Negative for abdominal distention and abdominal pain.   Genitourinary: Negative for dysuria.   Musculoskeletal: Positive for arthralgias. Negative for gait problem and joint swelling.   Skin: Negative for rash.   Neurological: Negative for speech difficulty.   Hematological: Does not bruise/bleed easily.   Psychiatric/Behavioral: Negative for confusion.       Past Medical History:   Diagnosis Date   • Arthritis    • CHF (congestive heart failure)    • Colon polyp    • COPD (chronic obstructive pulmonary disease)    • Coronary artery disease    • Fractures    • Heart attack    • Heart disease    • Heart failure    • History of bilateral inguinal hernias    • Hx of transfusion of whole blood    • Hypertension    • Kidney stone    • Myocardial infarction    • Obesity    • Seizures     due to medication Gabapentin   • Stroke     several TIA        Past Surgical History:   Procedure Laterality Date   • APPENDECTOMY  05/2012   • CARDIAC CATHETERIZATION     • CHOLECYSTECTOMY  12/2012   • COLON RESECTION     • HERNIA REPAIR  04/2016   • REVISION / TAKEDOWN COLOSTOMY         Family History   Problem Relation Age of Onset   • Heart attack Mother 56   • Hypertension Mother    •  Migraines Mother    • Stroke Mother    • Heart attack Father 39   • Hypertension Father    • Arthritis Paternal Grandmother    • Arthritis Paternal Grandfather        Social History     Social History   • Marital status:      Spouse name: N/A   • Number of children: N/A   • Years of education: N/A     Occupational History   • Not on file.     Social History Main Topics   • Smoking status: Former Smoker     Packs/day: 0.25     Types: Cigarettes     Quit date: 08/2016   • Smokeless tobacco: Never Used   • Alcohol use No      Comment: STOPPED 2001   • Drug use: No   • Sexual activity: Defer     Other Topics Concern   • Not on file     Social History Narrative   • No narrative on file       I have reviewed all of the above social hx, family hx, surgical hx, medications, allergies & ROS and confirm that it is accurate.    Allergies   Allergen Reactions   • Compazine [Prochlorperazine Edisylate] Anaphylaxis and Shortness Of Breath   • Gabapentin Other (See Comments)     seizures   • Toradol [Ketorolac Tromethamine] Hives and Shortness Of Breath   • Darvon [Propoxyphene] Other (See Comments) and Hives   • Vioxx [Rofecoxib] Other (See Comments)     Stomach bleeding  Stomach bleeding         Current Outpatient Prescriptions:   •  albuterol (PROVENTIL HFA;VENTOLIN HFA) 108 (90 Base) MCG/ACT inhaler, Inhale 2 puffs Every 4 (Four) Hours As Needed for Wheezing or Shortness of Air., Disp: 6.7 g, Rfl: 0  •  amLODIPine (NORVASC) 10 MG tablet, Take 1 tablet by mouth Daily., Disp: 30 tablet, Rfl: 11  •  carvedilol (COREG) 12.5 MG tablet, Take 1 tablet by mouth 2 (Two) Times a Day With Meals., Disp: 60 tablet, Rfl: 0  •  carvedilol (COREG) 25 MG tablet, , Disp: , Rfl:   •  CloNIDine (CATAPRES) 0.2 MG tablet, Take 1 tablet by mouth Every 12 (Twelve) Hours., Disp: 60 tablet, Rfl: 0  •  Fluticasone-Umeclidin-Vilant (TRELEGY ELLIPTA) 100-62.5-25 MCG/INH aerosol powder , Inhale 1 each Daily. Rinse mouth with water after use., Disp:  "1 each, Rfl: 5  •  HYDROcodone-acetaminophen (NORCO) 5-325 MG per tablet, Take 1 tablet by mouth Every 12 (Twelve) Hours As Needed (for pain)., Disp: 12 tablet, Rfl: 0  •  ipratropium-albuterol (DUO-NEB) 0.5-2.5 mg/mL nebulizer, Take 3 mL by nebulization Every 4 (Four) Hours As Needed for Wheezing., Disp: 360 mL, Rfl: 3  •  lisinopril (PRINIVIL,ZESTRIL) 20 MG tablet, , Disp: , Rfl:   •  lisinopril (PRINIVIL,ZESTRIL) 40 MG tablet, Take 1 tablet by mouth Daily., Disp: 30 tablet, Rfl: 0  •  Misc. Devices (PULSE OXIMETER DELUXE) misc, , Disp: , Rfl:   •  nystatin (MYCOSTATIN) 283129 UNIT/ML suspension, Take 5 mL by mouth 4 (Four) Times a Day., Disp: 280 mL, Rfl: 2    Objective   Resp 18   Ht 167.6 cm (66\")   Wt 91.6 kg (202 lb)   BMI 32.60 kg/m²    Physical Exam  Constitutional: Patient is oriented to person, place, and time. Patient appears well-developed and well-nourished.   HENT:Head: Normocephalic and atraumatic.   Eyes: EOM are normal. Pupils are equal, round, and reactive to light.   Neck: Normal range of motion. Neck supple.   Cardiovascular: Normal rate.    Pulmonary/Chest: Effort normal and breath sounds normal.   Abdominal: Soft.   Neurological: Patient is alert and oriented to person, place, and time.   Skin: Skin is warm and dry.   Psychiatric: Patient has a normal mood and affect.   Nursing note and vitals reviewed.       Ortho Exam   Left hand neurovascularly intact cast fits well no elbow pain    Assessment/Plan   Review of Radiographic Studies:    Radiographic images today of fracture I personally viewed and confirm satisfactory alignment.      Procedures     Tesfaye was seen today for follow-up and pain.    Diagnoses and all orders for this visit:    Other closed extra-articular fracture of distal end of left radius with routine healing, subsequent encounter       Orthopedic activities reviewed and patient expressed appreciation      Recommendations/Plan:   Work/Activity Status: No use of involved " extremity    Patient agreeable to call or return sooner for any concerns.             Impression:  Healing left extra-articular distal radial fracture  Plan:  Return in 1 month for cast removal x-rays left wrist out of cast

## 2018-05-07 ENCOUNTER — HOSPITAL ENCOUNTER (EMERGENCY)
Facility: HOSPITAL | Age: 43
Discharge: HOME | End: 2018-05-07
Attending: EMERGENCY MEDICINE | Admitting: EMERGENCY MEDICINE

## 2018-05-07 ENCOUNTER — APPOINTMENT (OUTPATIENT)
Dept: GENERAL RADIOLOGY | Facility: HOSPITAL | Age: 43
End: 2018-05-07

## 2018-05-07 ENCOUNTER — TELEPHONE (OUTPATIENT)
Dept: INTERNAL MEDICINE | Facility: CLINIC | Age: 43
End: 2018-05-07

## 2018-05-07 VITALS
SYSTOLIC BLOOD PRESSURE: 179 MMHG | HEIGHT: 66 IN | TEMPERATURE: 98.4 F | DIASTOLIC BLOOD PRESSURE: 109 MMHG | WEIGHT: 204.2 LBS | OXYGEN SATURATION: 96 % | RESPIRATION RATE: 18 BRPM | HEART RATE: 86 BPM | BODY MASS INDEX: 32.82 KG/M2

## 2018-05-07 DIAGNOSIS — S60.212A CONTUSION OF LEFT WRIST, INITIAL ENCOUNTER: Primary | ICD-10-CM
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
## 2018-05-07 PROCEDURE — 99283 EMERGENCY DEPT VISIT LOW MDM: CPT

## 2018-05-07 PROCEDURE — 73110 X-RAY EXAM OF WRIST: CPT

## 2018-05-07 RX ORDER — HYDROCODONE BITARTRATE AND ACETAMINOPHEN 5; 325 MG/1; MG/1
1 TABLET ORAL EVERY 6 HOURS PRN
Qty: 6 TABLET | Refills: 0 | Status: SHIPPED | OUTPATIENT
Start: 2018-05-07 | End: 2018-05-30

## 2018-05-07 NOTE — ED PROVIDER NOTES
Subjective   42-year-old male complaining of a left wrist injury that occurred PTA at home.  Pt was getting out of the tub when  He fell hitting his left wrist on the tub.  He is under the care of Dr. Mathews for left wrist fracture, has been in a cast for 2 1\2 weeks.  Denies any other injury.  The patient is concerned that after falling his cast began to feel too tight and he feels that his wrist is swelling.        History provided by:  Patient  History limited by: no limits.   used: No        Review of Systems   Constitutional: Negative.  Negative for appetite change, chills, diaphoresis and fever.   HENT: Negative.  Negative for congestion, ear pain and sore throat.    Eyes: Negative.    Respiratory: Negative.  Negative for cough, chest tightness, shortness of breath and wheezing.    Cardiovascular: Negative.  Negative for chest pain.   Gastrointestinal: Negative.  Negative for abdominal pain, blood in stool, diarrhea, nausea and vomiting.   Endocrine: Negative.    Genitourinary: Negative.  Negative for difficulty urinating.   Musculoskeletal: Positive for joint swelling. Negative for back pain and neck pain.   Skin: Negative.  Negative for rash.   Allergic/Immunologic: Negative.    Neurological: Negative.  Negative for headaches.   Hematological: Negative.    Psychiatric/Behavioral: Negative.    All other systems reviewed and are negative.      Past Medical History:   Diagnosis Date   • Arthritis    • CHF (congestive heart failure)    • Colon polyp    • COPD (chronic obstructive pulmonary disease)    • Coronary artery disease    • Fractures    • Heart attack    • Heart disease    • Heart failure    • History of bilateral inguinal hernias    • Hx of transfusion of whole blood    • Hypertension    • Kidney stone    • Myocardial infarction    • Obesity    • Seizures     due to medication Gabapentin   • Stroke     several TIA       Allergies   Allergen Reactions   • Compazine [Prochlorperazine  Edisylate] Anaphylaxis and Shortness Of Breath   • Gabapentin Other (See Comments)     seizures   • Toradol [Ketorolac Tromethamine] Hives and Shortness Of Breath   • Darvon [Propoxyphene] Other (See Comments) and Hives   • Vioxx [Rofecoxib] Other (See Comments)     Stomach bleeding  Stomach bleeding       Past Surgical History:   Procedure Laterality Date   • APPENDECTOMY  05/2012   • CARDIAC CATHETERIZATION     • CHOLECYSTECTOMY  12/2012   • COLON RESECTION     • HERNIA REPAIR  04/2016   • REVISION / TAKEDOWN COLOSTOMY         Family History   Problem Relation Age of Onset   • Heart attack Mother 56   • Hypertension Mother    • Migraines Mother    • Stroke Mother    • Heart attack Father 39   • Hypertension Father    • Arthritis Paternal Grandmother    • Arthritis Paternal Grandfather        Social History     Social History   • Marital status:      Social History Main Topics   • Smoking status: Former Smoker     Packs/day: 0.25     Types: Cigarettes     Quit date: 08/2016   • Smokeless tobacco: Never Used   • Alcohol use No      Comment: STOPPED 2001   • Drug use: No   • Sexual activity: Defer     Other Topics Concern   • Not on file           Objective   Physical Exam   Constitutional: He is oriented to person, place, and time. He appears well-developed and well-nourished. No distress.   HENT:   Head: Normocephalic and atraumatic.   Right Ear: External ear normal.   Left Ear: External ear normal.   Nose: Nose normal.   Mouth/Throat: Oropharynx is clear and moist. No oropharyngeal exudate.   Eyes: Conjunctivae and EOM are normal. Pupils are equal, round, and reactive to light. Right eye exhibits no discharge. Left eye exhibits no discharge. No scleral icterus.   Neck: Normal range of motion. Neck supple. No JVD present. No tracheal deviation present.   Cardiovascular: Normal rate, regular rhythm and normal heart sounds.    Pulmonary/Chest: Effort normal and breath sounds normal. No stridor. No respiratory  distress. He has no wheezes. He has no rales.   Abdominal: Soft. Bowel sounds are normal. He exhibits no mass. There is no tenderness. There is no rebound and no guarding. No hernia.   Musculoskeletal: He exhibits tenderness. He exhibits no edema or deformity.   Left forearm/wrist/hand in cast.   Lymphadenopathy:     He has no cervical adenopathy.   Neurological: He is alert and oriented to person, place, and time. No cranial nerve deficit. He exhibits normal muscle tone. Coordination normal.   Skin: Skin is warm and dry. No rash noted. He is not diaphoretic. No erythema. No pallor.   Psychiatric: He has a normal mood and affect. His behavior is normal. Judgment and thought content normal.   Nursing note and vitals reviewed.      Splint - Cast - Strapping  Date/Time: 5/7/2018 7:17 PM  Performed by: LAVINIA ADAM  Authorized by: GEETA RAGSDALE     Consent:     Consent obtained:  Verbal    Consent given by:  Patient    Risks discussed:  Numbness, pain and swelling  Pre-procedure details:     Sensation:  Normal  Procedure details:     Laterality:  Left    Location:  Wrist    Wrist:  L wrist    Strapping: no      Splint type:  Volar short arm    Supplies:  Ortho-Glass and cotton padding  Post-procedure details:     Pain:  Improved    Sensation:  Normal    Skin color:  Pink    Patient tolerance of procedure:  Tolerated well, no immediate complications               ED Course  ED Course   Comment By Time   Pt cast removed per his request and replaced with volar splint. Lavinia Adam PA-C 05/07 1918   Pt instructed to take b/p medication as soon as he gets home. Patient states will call Dr. Mathews in am and advise of ED visit/cast removal and splint. Lavinia Adam PA-C 05/07 1932                  MDM  Number of Diagnoses or Management Options  Contusion of left wrist, initial encounter: new and requires workup  Fall as cause of accidental injury in home as place of occurrence, initial encounter: new and requires workup      Amount and/or Complexity of Data Reviewed  Tests in the radiology section of CPT®: ordered and reviewed  Independent visualization of images, tracings, or specimens: yes    Risk of Complications, Morbidity, and/or Mortality  Presenting problems: moderate  Diagnostic procedures: moderate  Management options: moderate    Patient Progress  Patient progress: improved        Final diagnoses:   Contusion of left wrist, initial encounter   Fall as cause of accidental injury in home as place of occurrence, initial encounter            Lavinia Adam PA-C  05/07/18 [id number]

## 2018-05-07 NOTE — DISCHARGE INSTRUCTIONS
Call Dr. Mathews in am and arrange follow up appointment.  Do not drive or operate machinery while on [place].
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
## 2018-05-08 ENCOUNTER — OFFICE VISIT (OUTPATIENT)
Dept: ORTHOPEDIC SURGERY | Facility: CLINIC | Age: 43
End: 2018-05-08

## 2018-05-08 VITALS — RESPIRATION RATE: 12 BRPM | HEIGHT: 66 IN | WEIGHT: 204 LBS | BODY MASS INDEX: 32.78 KG/M2

## 2018-05-08 DIAGNOSIS — S52.552D OTHER CLOSED EXTRA-ARTICULAR FRACTURE OF DISTAL END OF LEFT RADIUS WITH ROUTINE HEALING, SUBSEQUENT ENCOUNTER: Primary | ICD-10-CM

## 2018-05-08 PROCEDURE — 29075 APPL CST ELBW FNGR SHORT ARM: CPT | Performed by: ORTHOPAEDIC SURGERY

## 2018-05-08 PROCEDURE — 99213 OFFICE O/P EST LOW 20 MIN: CPT | Performed by: ORTHOPAEDIC SURGERY

## 2018-05-08 NOTE — PROGRESS NOTES
Subjective   Patient ID: Tesfaye Ang is a 42 y.o. male  Follow-up of the Left Wrist (Pt states he fell getting out of bathtub last night, landed on left wrist on bottom of tub. States his fingers started going numb, wrist was swollen. He went to [facility] ER last night, cast was removed, pt advised to follow up here today. )             History of Present Illness   Complain of pain at the left wrist seen in the emergency department has cast removed volar splint applied x-rays taken yesterday showed no further displacement of his distal radial fracture he denies elbow shoulder pain numbness or tingling or head injury during the fall      Review of Systems   Constitutional: Negative for diaphoresis, fever and unexpected weight change.   HENT: Negative for dental problem and sore throat.    Eyes: Negative for visual disturbance.   Respiratory: Negative for shortness of breath.    Cardiovascular: Negative for chest pain.   Gastrointestinal: Negative for abdominal pain, constipation, diarrhea, nausea and vomiting.   Genitourinary: Negative for difficulty urinating and frequency.   Musculoskeletal: Positive for arthralgias.   Neurological: Negative for headaches.   Hematological: Does not bruise/bleed easily.   All other systems reviewed and are negative.      Past Medical History:   Diagnosis Date   • Arthritis    • CHF (congestive heart failure)    • Colon polyp    • COPD (chronic obstructive pulmonary disease)    • Coronary artery disease    • Fractures    • Heart attack    • Heart disease    • Heart failure    • History of bilateral inguinal hernias    • Hx of transfusion of whole blood    • Hypertension    • Kidney stone    • Myocardial infarction    • Obesity    • Seizures     due to medication Gabapentin   • Stroke     several TIA        Past Surgical History:   Procedure Laterality Date   • APPENDECTOMY  05/2012   • CARDIAC CATHETERIZATION     • CHOLECYSTECTOMY  12/2012   • COLON RESECTION     • HERNIA REPAIR   04/2016   • REVISION / TAKEDOWN COLOSTOMY         Family History   Problem Relation Age of Onset   • Heart attack Mother 56   • Hypertension Mother    • Migraines Mother    • Stroke Mother    • Heart attack Father 39   • Hypertension Father    • Arthritis Paternal Grandmother    • Arthritis Paternal Grandfather        Social History     Social History   • Marital status:      Spouse name: N/A   • Number of children: N/A   • Years of education: N/A     Occupational History   • Not on file.     Social History Main Topics   • Smoking status: Former Smoker     Packs/day: 0.25     Types: Cigarettes     Quit date: 08/2016   • Smokeless tobacco: Never Used   • Alcohol use No      Comment: STOPPED 2001   • Drug use: No   • Sexual activity: Defer     Other Topics Concern   • Not on file     Social History Narrative   • No narrative on file       I have reviewed all of the above social hx, family hx, surgical hx, medications, allergies & ROS and confirm that it is accurate.    Allergies   Allergen Reactions   • Compazine [Prochlorperazine Edisylate] Anaphylaxis and Shortness Of Breath   • Gabapentin Other (See Comments)     seizures   • Toradol [Ketorolac Tromethamine] Hives and Shortness Of Breath   • Darvon [Propoxyphene] Other (See Comments) and Hives   • Vioxx [Rofecoxib] Other (See Comments)     Stomach bleeding  Stomach bleeding         Current Outpatient Prescriptions:   •  albuterol (PROVENTIL HFA;VENTOLIN HFA) 108 (90 Base) MCG/ACT inhaler, Inhale 2 puffs Every 4 (Four) Hours As Needed for Wheezing or Shortness of Air., Disp: 6.7 g, Rfl: 0  •  amLODIPine (NORVASC) 10 MG tablet, Take 1 tablet by mouth Daily., Disp: 30 tablet, Rfl: 11  •  carvedilol (COREG) 12.5 MG tablet, Take 1 tablet by mouth 2 (Two) Times a Day With Meals., Disp: 60 tablet, Rfl: 0  •  CloNIDine (CATAPRES) 0.2 MG tablet, Take 1 tablet by mouth Every 12 (Twelve) Hours., Disp: 60 tablet, Rfl: 0  •  Fluticasone-Umeclidin-Vilant (TRELEGY ELLIPTA)  "100-62.5-25 MCG/INH aerosol powder , Inhale 1 each Daily. Rinse mouth with water after use., Disp: 1 each, Rfl: 5  •  HYDROcodone-acetaminophen (NORCO) 5-325 MG per tablet, Take 1 tablet by mouth Every 6 (Six) Hours As Needed for Moderate Pain ., Disp: 6 tablet, Rfl: 0  •  ipratropium-albuterol (DUO-NEB) 0.5-2.5 mg/mL nebulizer, Take 3 mL by nebulization Every 4 (Four) Hours As Needed for Wheezing., Disp: 360 mL, Rfl: 3  •  lisinopril (PRINIVIL,ZESTRIL) 20 MG tablet, , Disp: , Rfl:   •  lisinopril (PRINIVIL,ZESTRIL) 40 MG tablet, Take 1 tablet by mouth Daily., Disp: 30 tablet, Rfl: 0  •  Misc. Devices (PULSE OXIMETER DELUXE) misc, , Disp: , Rfl:   •  nystatin (MYCOSTATIN) 562079 UNIT/ML suspension, Take 5 mL by mouth 4 (Four) Times a Day., Disp: 280 mL, Rfl: 2  •  carvedilol (COREG) 25 MG tablet, , Disp: , Rfl:     Objective   Resp 12   Ht 167.6 cm (66\")   Wt 92.5 kg (204 lb)   BMI 32.93 kg/m²    Physical Exam  Constitutional: Patient is oriented to person, place, and time. Patient appears well-developed and well-nourished.   HENT:Head: Normocephalic and atraumatic.   Eyes: EOM are normal. Pupils are equal, round, and reactive to light.   Neck: Normal range of motion. Neck supple.   Cardiovascular: Normal rate.    Pulmonary/Chest: Effort normal and breath sounds normal.   Abdominal: Soft.   Neurological: Patient is alert and oriented to person, place, and time.   Skin: Skin is warm and dry.   Psychiatric: Patient has a normal mood and affect.   Nursing note and vitals reviewed.       Ortho Exam   Left wrist with minimal swelling no ecchymosis hand neurovascularly intact elbow nontender forearm soft    Assessment/Plan   Review of Radiographic Studies:    Radiographic images today of fracture I personally viewed and confirm satisfactory alignment.      Left short arm fiberglass cast application  Date/Time: 5/8/2018 1:16 PM  Performed by: TIFFNAY BALLARD  Authorized by: TIFFANY BALLARD   Consent: Verbal consent " obtained.  Risks and benefits: risks, benefits and alternatives were discussed  Consent given by: patient  Patient understanding: patient states understanding of the procedure being performed  Patient identity confirmed: verbally with patient  Location details: left arm  Splint type: volar short arm  Supplies used: Ortho-Glass  Post-procedure: The splinted body part was neurovascularly unchanged following the procedure.  Patient tolerance: Patient tolerated the procedure well with no immediate complications           Tesfaye was seen today for follow-up.    Diagnoses and all orders for this visit:    Other closed extra-articular fracture of distal end of left radius with routine healing, subsequent encounter  -     XR Wrist 3+ View Left    Other orders  -     Splint Application       Orthopedic activities reviewed and patient expressed appreciation and Risk, benefits, and merits of treatment alternatives reviewed with the patient and questions answered      Recommendations/Plan:   Work/Activity Status: No use of involved extremity    Patient agreeable to call or return sooner for any concerns.             Impression:  Left distal radial fracture  Plan:  Return in 4-5 weeks for cast removal x-rays left wrist out of cast

## 2018-05-30 ENCOUNTER — OFFICE VISIT (OUTPATIENT)
Dept: INTERNAL MEDICINE | Facility: CLINIC | Age: 43
End: 2018-05-30

## 2018-05-30 VITALS
TEMPERATURE: 97.2 F | BODY MASS INDEX: 32.48 KG/M2 | DIASTOLIC BLOOD PRESSURE: 96 MMHG | RESPIRATION RATE: 18 BRPM | OXYGEN SATURATION: 98 % | SYSTOLIC BLOOD PRESSURE: 136 MMHG | WEIGHT: 202.13 LBS | HEIGHT: 66 IN

## 2018-05-30 DIAGNOSIS — J44.1 COPD WITH ACUTE EXACERBATION (HCC): Primary | ICD-10-CM

## 2018-05-30 PROCEDURE — 99213 OFFICE O/P EST LOW 20 MIN: CPT | Performed by: NURSE PRACTITIONER

## 2018-05-30 RX ORDER — AZITHROMYCIN 250 MG/1
TABLET, FILM COATED ORAL
Qty: 6 TABLET | Refills: 0 | Status: SHIPPED | OUTPATIENT
Start: 2018-05-30 | End: 2018-06-19

## 2018-05-30 RX ORDER — HYDROCODONE BITARTRATE AND ACETAMINOPHEN 7.5; 325 MG/1; MG/1
1 TABLET ORAL EVERY 6 HOURS PRN
Refills: 0 | COMMUNITY
Start: 2018-05-22 | End: 2018-11-18

## 2018-05-30 NOTE — PROGRESS NOTES
Chief Complaint / Reason:      Chief Complaint   Patient presents with   • Cough     dry, onset a little more than a weeka go   • Shortness of Breath     o2 dropped to 84 the other night.    • Fever     waking up in sweats       Subjective     HPI  Patient presents today with complaints of cough and states onset was a week ago.  He states that he has become more short of breath and his O2 sat dropped to 84 the other night.  He states he has been running low-grade fever and is waking up and night sweats denies chest pain or heart palpitations vital signs reveal oxygenation is 98% on 2 L nasal cannula.  Blood pressure is slightly elevated but as he states since his he has difficulty breathing.  Patient states that he is supposed to wear a BiPAP at night but has not been doing so because he has had difficulty breathing.  He states the cough is dry and he doesn't seem to be able to cough anything out but it is harsh.  Does have frequent wheezing.    History taken from: patient    PMH/FH/Social History were reviewed and updated appropriately in the electronic medical record.     Review of Systems:   Review of Systems   Constitutional: Positive for fatigue and fever.   Respiratory: Positive for shortness of breath and wheezing.    Cardiovascular: Positive for chest pain and leg swelling.   Musculoskeletal: Positive for arthralgias, back pain, gait problem and myalgias.   Neurological: Positive for syncope and weakness.     All other systems were reviewed and are negative.  Exceptions are noted in the subjective or above.      Objective     Vital Signs  Vitals:    05/30/18 1607   BP: 136/96   Resp: 18   Temp: 97.2 °F (36.2 °C)   SpO2: 98%       Body mass index is 32.62 kg/m².    Physical Exam   Constitutional: He is oriented to person, place, and time. Vital signs are normal. He appears well-developed and well-nourished. He is active. He has a sickly appearance. He appears ill. He appears distressed.   HENT:   Head:  Normocephalic and atraumatic.   Right Ear: Hearing normal.   Left Ear: Hearing normal.   Nose: Nose normal.   Mouth/Throat: Abnormal dentition.   Eyes: EOM and lids are normal. Pupils are equal, round, and reactive to light. No scleral icterus.   Neck: Neck supple.   Cardiovascular: Normal rate, regular rhythm, normal heart sounds and intact distal pulses.    Pulmonary/Chest: Accessory muscle usage present. No stridor. No respiratory distress. He has no decreased breath sounds. He has wheezes. He has no rhonchi. He has no rales.   Wearing nasal cannula oxygen; deep hacking cough.   Neurological: He is alert and oriented to person, place, and time. He displays no tremor. No cranial nerve deficit. Gait (walks with cane) abnormal. GCS eye subscore is 4. GCS verbal subscore is 5. GCS motor subscore is 6.   Skin: Skin is warm. Capillary refill takes less than 2 seconds. He is not diaphoretic. No cyanosis. No pallor.   Psychiatric: He has a normal mood and affect. His speech is normal and behavior is normal. Judgment and thought content normal. Cognition and memory are normal.   Nursing note and vitals reviewed.       Results Review:    I reviewed the patient's previous clinical results.       Medication Review:     Current Outpatient Prescriptions:   •  albuterol (PROVENTIL HFA;VENTOLIN HFA) 108 (90 Base) MCG/ACT inhaler, Inhale 2 puffs Every 4 (Four) Hours As Needed for Wheezing or Shortness of Air., Disp: 6.7 g, Rfl: 0  •  amLODIPine (NORVASC) 10 MG tablet, Take 1 tablet by mouth Daily., Disp: 30 tablet, Rfl: 11  •  carvedilol (COREG) 25 MG tablet, , Disp: , Rfl:   •  CloNIDine (CATAPRES) 0.2 MG tablet, Take 1 tablet by mouth Every 12 (Twelve) Hours., Disp: 60 tablet, Rfl: 0  •  Fluticasone-Umeclidin-Vilant (TRELEGY ELLIPTA) 100-62.5-25 MCG/INH aerosol powder , Inhale 1 each Daily. Rinse mouth with water after use., Disp: 1 each, Rfl: 5  •  HYDROcodone-acetaminophen (NORCO) 7.5-325 MG per tablet, Take 1 tablet by mouth  3 (Three) Times a Day As Needed., Disp: , Rfl: 0  •  ipratropium-albuterol (DUO-NEB) 0.5-2.5 mg/mL nebulizer, Take 3 mL by nebulization Every 4 (Four) Hours As Needed for Wheezing., Disp: 360 mL, Rfl: 3  •  lisinopril (PRINIVIL,ZESTRIL) 20 MG tablet, , Disp: , Rfl:   •  Misc. Devices (PULSE OXIMETER DELUXE) misc, , Disp: , Rfl:   •  azithromycin (ZITHROMAX Z-KAVITA) 250 MG tablet, Take 2 tablets the first day, then 1 tablet daily for 4 days., Disp: 6 tablet, Rfl: 0    Assessment/Plan   Tesfaye was seen today for cough, shortness of breath and fever.    Diagnoses and all orders for this visit:    COPD with acute exacerbation  -     azithromycin (ZITHROMAX Z-KAVITA) 250 MG tablet; Take 2 tablets the first day, then 1 tablet daily for 4 days.    Avoid secondhand smoke  Recommend patient cough and deep breathe increase water intake to thin secretions and recommend he wear oxygen.  Use albuterol inhaler as needed for shortness of breath or wheezing.  Return if symptoms worsen or fail to improve.    Irene Parekh, APRN  05/30/2018

## 2018-05-31 ENCOUNTER — OFFICE VISIT (OUTPATIENT)
Dept: ORTHOPEDIC SURGERY | Facility: CLINIC | Age: 43
End: 2018-05-31

## 2018-05-31 VITALS — BODY MASS INDEX: 32.47 KG/M2 | RESPIRATION RATE: 18 BRPM | WEIGHT: 202 LBS | HEIGHT: 66 IN

## 2018-05-31 DIAGNOSIS — S52.552D OTHER CLOSED EXTRA-ARTICULAR FRACTURE OF DISTAL END OF LEFT RADIUS WITH ROUTINE HEALING, SUBSEQUENT ENCOUNTER: Primary | ICD-10-CM

## 2018-05-31 PROCEDURE — 99213 OFFICE O/P EST LOW 20 MIN: CPT | Performed by: ORTHOPAEDIC SURGERY

## 2018-05-31 NOTE — PROGRESS NOTES
Subjective   Patient ID: Tesfaye Ang is a 42 y.o. male  Follow-up of the Left Wrist (Patient came in a few days early due to going out of town, he says his wrist is stiff and sore from not moving it.) and Cast Removal             History of Present Illness returns for cast removal plans to go to [place] for a month to help exam to his aunt  recently diagnosed with cancer, has no complaints of left wrist pain.    Review of Systems   Constitutional: Negative for fever.   HENT: Negative for voice change.    Eyes: Negative for visual disturbance.   Respiratory: Negative for shortness of breath.    Cardiovascular: Negative for chest pain.   Gastrointestinal: Negative for abdominal distention and abdominal pain.   Genitourinary: Negative for dysuria.   Musculoskeletal: Positive for arthralgias. Negative for gait problem and joint swelling.   Skin: Negative for rash.   Neurological: Negative for speech difficulty.   Hematological: Does not bruise/bleed easily.   Psychiatric/Behavioral: Negative for confusion.       Past Medical History:   Diagnosis Date   • Arthritis    • CHF (congestive heart failure)    • Colon polyp    • COPD (chronic obstructive pulmonary disease)    • Coronary artery disease    • Fractures    • Heart attack    • Heart disease    • Heart failure    • History of bilateral inguinal hernias    • Hx of transfusion of whole blood    • Hypertension    • Kidney stone    • Myocardial infarction    • Obesity    • Seizures     due to medication Gabapentin   • Stroke     several TIA        Past Surgical History:   Procedure Laterality Date   • APPENDECTOMY  05/2012   • CARDIAC CATHETERIZATION     • CHOLECYSTECTOMY  12/2012   • COLON RESECTION     • HERNIA REPAIR  04/2016   • REVISION / TAKEDOWN COLOSTOMY         Family History   Problem Relation Age of Onset   • Heart attack Mother 56   • Hypertension Mother    • Migraines Mother    • Stroke Mother    • Heart attack Father 39   • Hypertension Father    •  Arthritis Paternal Grandmother    • Arthritis Paternal Grandfather        Social History     Social History   • Marital status:      Spouse name: N/A   • Number of children: N/A   • Years of education: N/A     Occupational History   • Not on file.     Social History Main Topics   • Smoking status: Former Smoker     Packs/day: 0.25     Types: Cigarettes     Quit date: 08/2016   • Smokeless tobacco: Never Used   • Alcohol use No      Comment: STOPPED 2001   • Drug use: No   • Sexual activity: Defer     Other Topics Concern   • Not on file     Social History Narrative   • No narrative on file       I have reviewed all of the above social hx, family hx, surgical hx, medications, allergies & ROS and confirm that it is accurate.    Allergies   Allergen Reactions   • Compazine [Prochlorperazine Edisylate] Anaphylaxis and Shortness Of Breath   • Gabapentin Other (See Comments)     seizures   • Toradol [Ketorolac Tromethamine] Hives and Shortness Of Breath   • Darvon [Propoxyphene] Other (See Comments) and Hives   • Vioxx [Rofecoxib] Other (See Comments)     Stomach bleeding  Stomach bleeding         Current Outpatient Prescriptions:   •  albuterol (PROVENTIL HFA;VENTOLIN HFA) 108 (90 Base) MCG/ACT inhaler, Inhale 2 puffs Every 4 (Four) Hours As Needed for Wheezing or Shortness of Air., Disp: 6.7 g, Rfl: 0  •  amLODIPine (NORVASC) 10 MG tablet, Take 1 tablet by mouth Daily., Disp: 30 tablet, Rfl: 11  •  azithromycin (ZITHROMAX Z-KAVITA) 250 MG tablet, Take 2 tablets the first day, then 1 tablet daily for 4 days., Disp: 6 tablet, Rfl: 0  •  carvedilol (COREG) 25 MG tablet, , Disp: , Rfl:   •  CloNIDine (CATAPRES) 0.2 MG tablet, Take 1 tablet by mouth Every 12 (Twelve) Hours., Disp: 60 tablet, Rfl: 0  •  Fluticasone-Umeclidin-Vilant (TRELEGY ELLIPTA) 100-62.5-25 MCG/INH aerosol powder , Inhale 1 each Daily. Rinse mouth with water after use., Disp: 1 each, Rfl: 5  •  HYDROcodone-acetaminophen (NORCO) 7.5-325 MG per tablet,  "Take 1 tablet by mouth 3 (Three) Times a Day As Needed., Disp: , Rfl: 0  •  ipratropium-albuterol (DUO-NEB) 0.5-2.5 mg/mL nebulizer, Take 3 mL by nebulization Every 4 (Four) Hours As Needed for Wheezing., Disp: 360 mL, Rfl: 3  •  lisinopril (PRINIVIL,ZESTRIL) 20 MG tablet, , Disp: , Rfl:   •  Misc. Devices (PULSE OXIMETER DELUXE) misc, , Disp: , Rfl:     Objective   Resp 18   Ht 167.6 cm (66\")   Wt 91.6 kg (202 lb)   BMI 32.60 kg/m²    Physical Exam  Constitutional: Patient is oriented to person, place, and time. Patient appears well-developed and well-nourished.   HENT:Head: Normocephalic and atraumatic.   Eyes: EOM are normal. Pupils are equal, round, and reactive to light.   Neck: Normal range of motion. Neck supple.   Cardiovascular: Normal rate.    Pulmonary/Chest: Effort normal and breath sounds normal.   Abdominal: Soft.   Neurological: Patient is alert and oriented to person, place, and time.   Skin: Skin is warm and dry.   Psychiatric: Patient has a normal mood and affect.   Nursing note and vitals reviewed.       Ortho Exam   Left wrist with minimal tenderness slight restriction of motion consistent with healing fracture hand grossly neurovascularly intact skin appears dry but no open abrasions or contusions    Assessment/Plan   Review of Radiographic Studies:    Radiographic images today of fracture I personally viewed and confirm satisfactory alignment.      Procedures     Tesfaye was seen today for cast removal and follow-up.    Diagnoses and all orders for this visit:    Other closed extra-articular fracture of distal end of left radius with routine healing, subsequent encounter       Orthopedic activities reviewed and patient expressed appreciation and Risk, benefits, and merits of treatment alternatives reviewed with the patient and questions answered      Recommendations/Plan:   Exercise, medications, injections, other patient advice, and return appointment as noted.    Patient agreeable to call " or return sooner for any concerns.             Impression:  Healed left distal radial extra-articular fracture  Plan:  Offered referral to therapy but patient plans to move to [place] for the next month prefers not to have formal therapy will do his own exercises and return if necessary.

## 2018-06-19 ENCOUNTER — OFFICE VISIT (OUTPATIENT)
Dept: INTERNAL MEDICINE | Facility: CLINIC | Age: 43
End: 2018-06-19

## 2018-06-19 VITALS
RESPIRATION RATE: 14 BRPM | HEART RATE: 97 BPM | OXYGEN SATURATION: 96 % | TEMPERATURE: 97.2 F | WEIGHT: 203 LBS | BODY MASS INDEX: 32.62 KG/M2 | DIASTOLIC BLOOD PRESSURE: 84 MMHG | SYSTOLIC BLOOD PRESSURE: 132 MMHG | HEIGHT: 66 IN

## 2018-06-19 DIAGNOSIS — H66.002 ACUTE SUPPURATIVE OTITIS MEDIA OF LEFT EAR WITHOUT SPONTANEOUS RUPTURE OF TYMPANIC MEMBRANE, RECURRENCE NOT SPECIFIED: Primary | ICD-10-CM

## 2018-06-19 PROCEDURE — 99213 OFFICE O/P EST LOW 20 MIN: CPT | Performed by: FAMILY MEDICINE

## 2018-06-19 RX ORDER — AMLODIPINE BESYLATE 10 MG/1
10 TABLET ORAL DAILY
Qty: 90 TABLET | Refills: 1 | Status: SHIPPED | OUTPATIENT
Start: 2018-06-19 | End: 2018-11-14

## 2018-06-19 RX ORDER — AMOXICILLIN 875 MG/1
875 TABLET, COATED ORAL EVERY 12 HOURS SCHEDULED
Qty: 20 TABLET | Refills: 0 | Status: SHIPPED | OUTPATIENT
Start: 2018-06-19 | End: 2018-06-29

## 2018-06-19 RX ORDER — CARVEDILOL 25 MG/1
25 TABLET ORAL 2 TIMES DAILY WITH MEALS
Qty: 180 TABLET | Refills: 1 | Status: SHIPPED | OUTPATIENT
Start: 2018-06-19 | End: 2018-10-18 | Stop reason: HOSPADM

## 2018-06-19 RX ORDER — LISINOPRIL 20 MG/1
20 TABLET ORAL DAILY
Qty: 90 TABLET | Refills: 1 | Status: SHIPPED | OUTPATIENT
Start: 2018-06-19 | End: 2018-10-18 | Stop reason: HOSPADM

## 2018-06-19 NOTE — PROGRESS NOTES
"Subjective    Tesfaye Ang is a 42 y.o. male here for:  Chief Complaint   Patient presents with   • Ear Problem     Pressure in left ear     History of Present Illness     Left ear pressure, different than ear infection. Hearing loss. When he is exposed to noise he gets a migraine. Also having tinnitus, this has been present for a month. Pressure started two weeks ago.     The following portions of the patient's history were reviewed and updated as appropriate: allergies, current medications, past family history, past medical history, past social history, past surgical history and problem list.    Review of Systems   Respiratory: Positive for shortness of breath.    Musculoskeletal: Positive for arthralgias.       Vitals:    06/19/18 1301   BP: 132/84   Pulse: 97   Resp: 14   Temp: 97.2 °F (36.2 °C)   SpO2: 96%   Weight: 92.1 kg (203 lb)   Height: 167.6 cm (66\")         Objective   Physical Exam   Constitutional: Vital signs are normal. He appears well-developed and well-nourished. He is active. He has a sickly appearance.   HENT:   Right Ear: Hearing, tympanic membrane, external ear and ear canal normal.   Left Ear: External ear and ear canal normal. Tympanic membrane is erythematous and bulging. Tympanic membrane is not perforated.   Neurological: He is alert.   Nursing note and vitals reviewed.        Assessment/Plan     Problem List Items Addressed This Visit     None      Visit Diagnoses     Acute suppurative otitis media of left ear without spontaneous rupture of tympanic membrane, recurrence not specified    -  Primary    Relevant Medications    amoxicillin (AMOXIL) 875 MG tablet          ·     No Follow-up on file.    Karlee Thomas MD    Please note that portions of this note may have been completed with a voice recognition program. Efforts were made to edit dictation, but occasionally words are mistranscribed.  "

## 2018-07-09 ENCOUNTER — OFFICE VISIT (OUTPATIENT)
Dept: PULMONOLOGY | Facility: CLINIC | Age: 43
End: 2018-07-09

## 2018-07-09 VITALS
OXYGEN SATURATION: 97 % | HEART RATE: 70 BPM | SYSTOLIC BLOOD PRESSURE: 134 MMHG | DIASTOLIC BLOOD PRESSURE: 86 MMHG | BODY MASS INDEX: 33.27 KG/M2 | RESPIRATION RATE: 16 BRPM | WEIGHT: 207 LBS | HEIGHT: 66 IN

## 2018-07-09 DIAGNOSIS — G47.33 OSA (OBSTRUCTIVE SLEEP APNEA): ICD-10-CM

## 2018-07-09 DIAGNOSIS — J44.9 CHRONIC OBSTRUCTIVE PULMONARY DISEASE, UNSPECIFIED COPD TYPE (HCC): ICD-10-CM

## 2018-07-09 DIAGNOSIS — R09.02 HYPOXIA: ICD-10-CM

## 2018-07-09 DIAGNOSIS — R91.1 LUNG NODULE, SOLITARY: ICD-10-CM

## 2018-07-09 DIAGNOSIS — R06.02 SHORTNESS OF BREATH: Primary | ICD-10-CM

## 2018-07-09 DIAGNOSIS — F17.200 SMOKING: ICD-10-CM

## 2018-07-09 PROCEDURE — 94618 PULMONARY STRESS TESTING: CPT | Performed by: NURSE PRACTITIONER

## 2018-07-09 PROCEDURE — 99214 OFFICE O/P EST MOD 30 MIN: CPT | Performed by: NURSE PRACTITIONER

## 2018-07-28 ENCOUNTER — HOSPITAL ENCOUNTER (EMERGENCY)
Facility: HOSPITAL | Age: 43
Discharge: HOME | End: 2018-07-29
Attending: EMERGENCY MEDICINE | Admitting: EMERGENCY MEDICINE

## 2018-07-28 DIAGNOSIS — L25.9 CONTACT DERMATITIS, UNSPECIFIED CONTACT DERMATITIS TYPE, UNSPECIFIED TRIGGER: Primary | ICD-10-CM

## 2018-07-28 PROCEDURE — 99283 EMERGENCY DEPT VISIT LOW MDM: CPT

## 2018-07-28 RX ORDER — DIAPER,BRIEF,INFANT-TODD,DISP
EACH MISCELLANEOUS ONCE
Status: COMPLETED | OUTPATIENT
Start: 2018-07-28 | End: 2018-07-29

## 2018-07-29 VITALS
HEIGHT: 66 IN | BODY MASS INDEX: 33.88 KG/M2 | DIASTOLIC BLOOD PRESSURE: 92 MMHG | WEIGHT: 210.8 LBS | SYSTOLIC BLOOD PRESSURE: 162 MMHG | RESPIRATION RATE: 20 BRPM | TEMPERATURE: 98.4 F | OXYGEN SATURATION: 96 % | HEART RATE: 86 BPM

## 2018-07-29 PROCEDURE — 25010000002 DEXAMETHASONE PER 1 MG: Performed by: EMERGENCY MEDICINE

## 2018-07-29 RX ADMIN — HYDROCORTISONE 1 APPLICATION: 10 CREAM TOPICAL at 00:23

## 2018-07-29 RX ADMIN — DEXAMETHASONE SODIUM PHOSPHATE 10 MG: 10 INJECTION, SOLUTION INTRAMUSCULAR; INTRAVENOUS at 00:23

## 2018-09-18 ENCOUNTER — HOSPITAL ENCOUNTER (OUTPATIENT)
Dept: CT IMAGING | Facility: HOSPITAL | Age: 43
Discharge: HOME | End: 2018-09-18
Admitting: NURSE PRACTITIONER

## 2018-09-18 DIAGNOSIS — R91.1 LUNG NODULE, SOLITARY: ICD-10-CM

## 2018-09-18 PROCEDURE — 71250 CT THORAX DX C-: CPT

## 2018-09-21 ENCOUNTER — OFFICE VISIT (OUTPATIENT)
Dept: INTERNAL MEDICINE | Facility: CLINIC | Age: 43
End: 2018-09-21

## 2018-09-21 VITALS
TEMPERATURE: 98.3 F | SYSTOLIC BLOOD PRESSURE: 220 MMHG | WEIGHT: 204.38 LBS | BODY MASS INDEX: 32.85 KG/M2 | OXYGEN SATURATION: 98 % | HEART RATE: 115 BPM | DIASTOLIC BLOOD PRESSURE: 140 MMHG | HEIGHT: 66 IN

## 2018-09-21 DIAGNOSIS — Z74.09 IMPAIRED MOBILITY AND ADLS: ICD-10-CM

## 2018-09-21 DIAGNOSIS — I16.0 HYPERTENSIVE URGENCY: Primary | ICD-10-CM

## 2018-09-21 DIAGNOSIS — M79.2 NEUROPATHIC PAIN: ICD-10-CM

## 2018-09-21 DIAGNOSIS — Z86.73 HISTORY OF TIA (TRANSIENT ISCHEMIC ATTACK): ICD-10-CM

## 2018-09-21 DIAGNOSIS — G62.9 NEUROPATHY: ICD-10-CM

## 2018-09-21 DIAGNOSIS — R56.9 SEIZURES (HCC): ICD-10-CM

## 2018-09-21 DIAGNOSIS — J96.11 CHRONIC RESPIRATORY FAILURE WITH HYPOXIA (HCC): ICD-10-CM

## 2018-09-21 DIAGNOSIS — J43.2 CENTRILOBULAR EMPHYSEMA (HCC): ICD-10-CM

## 2018-09-21 DIAGNOSIS — R26.81 GAIT INSTABILITY: ICD-10-CM

## 2018-09-21 DIAGNOSIS — F32.2 SEVERE DEPRESSION (HCC): ICD-10-CM

## 2018-09-21 DIAGNOSIS — G89.4 CHRONIC PAIN SYNDROME: ICD-10-CM

## 2018-09-21 DIAGNOSIS — R53.1 WEAKNESS GENERALIZED: ICD-10-CM

## 2018-09-21 DIAGNOSIS — Z86.79 HISTORY OF HEART FAILURE: ICD-10-CM

## 2018-09-21 DIAGNOSIS — I67.9 CEREBROVASCULAR DISEASE: ICD-10-CM

## 2018-09-21 DIAGNOSIS — I25.2 HISTORY OF MYOCARDIAL INFARCTION: Chronic | ICD-10-CM

## 2018-09-21 DIAGNOSIS — H53.9 VISION DISTURBANCE: ICD-10-CM

## 2018-09-21 DIAGNOSIS — Z99.81 OXYGEN DEPENDENT: ICD-10-CM

## 2018-09-21 DIAGNOSIS — I25.10 CORONARY ARTERY DISEASE INVOLVING NATIVE CORONARY ARTERY OF NATIVE HEART WITHOUT ANGINA PECTORIS: ICD-10-CM

## 2018-09-21 DIAGNOSIS — Z78.9 IMPAIRED MOBILITY AND ADLS: ICD-10-CM

## 2018-09-21 PROCEDURE — 99214 OFFICE O/P EST MOD 30 MIN: CPT | Performed by: FAMILY MEDICINE

## 2018-09-21 RX ORDER — CLONIDINE HYDROCHLORIDE 0.1 MG/1
0.1 TABLET ORAL ONCE
Status: DISCONTINUED | OUTPATIENT
Start: 2018-09-21 | End: 2018-10-15

## 2018-09-21 RX ORDER — DULOXETIN HYDROCHLORIDE 20 MG/1
20 CAPSULE, DELAYED RELEASE ORAL DAILY
Qty: 30 CAPSULE | Refills: 5 | Status: SHIPPED | OUTPATIENT
Start: 2018-09-21 | End: 2018-12-03

## 2018-09-21 RX ORDER — CLONIDINE HYDROCHLORIDE 0.1 MG/1
0.1 TABLET ORAL EVERY 12 HOURS SCHEDULED
Status: DISCONTINUED | OUTPATIENT
Start: 2018-09-21 | End: 2018-09-21

## 2018-09-21 RX ADMIN — CLONIDINE HYDROCHLORIDE 0.1 MG: 0.1 TABLET ORAL at 15:57

## 2018-09-21 RX ADMIN — CLONIDINE HYDROCHLORIDE 0.1 MG: 0.1 TABLET ORAL at 15:43

## 2018-09-21 NOTE — PROGRESS NOTES
Subjective    Tesfaye Ang is a 42 y.o. male here for:  Chief Complaint   Patient presents with   • Anxiety     Follow up for Anxiety, and COPD. Has several things that he would like to discuss in private.   • COPD   • Foot Swelling     Complaints of swelling in his feet and legs.     History of Present Illness     Patient is under a lot of stress. 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 Patient walks with cane, unstable at times, and is oxygen dependent.    He got up a bit later than normal today and looking back he thinks he forgot his blood pressure medicine. He denies signs of end organ damage such has chest pain, headache, etc. He's had some swelling to both of his legs, shows a photo on his phone of both ankles and lower legs swollen. Patient is urinating every hour if not more he says.     Mood is not great. Patient is starting to isolate himself, staying away from family. He's having episodes of crying. Frustrated with life, feels like giving up at times but denies suicidal thoughts. He's not tried medicine for depression in the past. He has chronic pain, goes to pain management, but having issues with that as well.    The following portions of the patient's history were reviewed and updated as appropriate: allergies, current medications, past family history, past medical history, past social history, past surgical history and problem list.    Review of Systems   Eyes: Negative for visual disturbance.   Respiratory: Negative for shortness of breath (no worsening from baseline (baseline poor breathing)).    Cardiovascular: Negative for chest pain.   Genitourinary: Positive for frequency.   Musculoskeletal: Positive for arthralgias, back pain, gait problem and myalgias.   Neurological: Positive for weakness. Negative for headache and  "confusion.   Psychiatric/Behavioral: Positive for depressed mood and stress. Negative for self-injury and suicidal ideas. The patient is nervous/anxious.        Vitals:    09/21/18 1533   BP: (!) 220/140   Pulse: 115   Temp: 98.3 °F (36.8 °C)   SpO2: 98%   Weight: 92.7 kg (204 lb 6 oz)   Height: 167.6 cm (65.98\")       Objective   Physical Exam   Constitutional: He is oriented to person, place, and time. Vital signs are normal. He appears well-developed and well-nourished. He is active.  Non-toxic appearance. He has a sickly appearance. He does not appear ill. No distress. He is obese.  HENT:   Head: Normocephalic and atraumatic. 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   Mouth/Throat: Mucous membranes are normal. Abnormal dentition.   Eyes: EOM are normal.   Neck: Neck supple.   Cardiovascular: Regular rhythm.  Tachycardia present.    Pulmonary/Chest: Effort normal.   Wearing oxygen via nasal canula   Neurological: He is alert and oriented to person, place, and time. He displays tremor. No cranial nerve deficit. Gait abnormal.   Uses cane to walk. Antalgic gait.   Skin: Skin is warm. He is not diaphoretic. No cyanosis. No pallor.   Psychiatric: His speech is normal and behavior is normal. Judgment and thought content normal. His mood appears anxious. Cognition and memory are normal. He exhibits a depressed mood (tearful at times). He is attentive.   Nursing note and vitals reviewed.        Assessment/Plan     Problem List Items Addressed This Visit        Cardiovascular and Mediastinum    Coronary artery disease involving native coronary artery of native heart without angina pectoris    Relevant Orders    Functional Residual Capacity    Cerebrovascular disease    Relevant Orders    Functional Residual Capacity       Respiratory    COPD (chronic obstructive pulmonary disease) (CMS/[facility])    Relevant Orders    Functional Residual Capacity       Nervous and Auditory    Neuropathy    Relevant Orders    Functional Residual " Capacity    Neuropathic pain    Relevant Orders    Functional Residual Capacity    Seizures (CMS/HCC)    Relevant Orders    Functional Residual Capacity       Other    History of myocardial infarction (Chronic)    Relevant Orders    Functional Residual Capacity    Vision disturbance    Relevant Orders    Functional Residual Capacity    Chronic pain syndrome    Relevant Orders    Functional Residual Capacity    Respiratory failure with hypoxia (CMS/HCC)    Relevant Orders    Functional Residual Capacity    History of TIA (transient ischemic attack)    Relevant Orders    Functional Residual Capacity    History of heart failure    Relevant Orders    Functional Residual Capacity    Gait instability    Relevant Orders    Functional Residual Capacity    Weakness generalized    Relevant Orders    Functional Residual Capacity    Oxygen dependent    Relevant Orders    Functional Residual Capacity    Impaired mobility and ADLs    Relevant Orders    Functional Residual Capacity      Other Visit Diagnoses     Hypertensive urgency    -  Primary    Relevant Medications    CloNIDine (CATAPRES) tablet 0.1 mg    CloNIDine (CATAPRES) tablet 0.1 mg    Severe depression (CMS/HCC)        Relevant Medications    DULoxetine (CYMBALTA) 20 MG capsule    Other Relevant Orders    Ambulatory Referral to Psychiatry          · Blood pressure came down just a bit with clonidine. Patient w/o end organ damage signs. Going home to take his blood pressure medicines. I stressed to him the need to seek emergency care for any decline in his condition and he agreed to do so.     No Follow-up on file.    Karlee Thomas MD

## 2018-09-24 ENCOUNTER — TRANSCRIBE ORDERS (OUTPATIENT)
Dept: INTERNAL MEDICINE | Facility: CLINIC | Age: 43
End: 2018-09-24

## 2018-09-24 DIAGNOSIS — R06.02 SOB (SHORTNESS OF BREATH): Primary | ICD-10-CM

## 2018-09-28 ENCOUNTER — HOSPITAL ENCOUNTER (OUTPATIENT)
Dept: PULMONOLOGY | Facility: HOSPITAL | Age: 43
Discharge: HOME | End: 2018-09-28
Admitting: FAMILY MEDICINE

## 2018-09-28 DIAGNOSIS — I25.10 CORONARY ARTERY DISEASE INVOLVING NATIVE CORONARY ARTERY OF NATIVE HEART WITHOUT ANGINA PECTORIS: ICD-10-CM

## 2018-09-28 DIAGNOSIS — J43.2 CENTRILOBULAR EMPHYSEMA (HCC): ICD-10-CM

## 2018-09-28 DIAGNOSIS — J96.11 CHRONIC RESPIRATORY FAILURE WITH HYPOXIA (HCC): ICD-10-CM

## 2018-09-28 DIAGNOSIS — G89.4 CHRONIC PAIN SYNDROME: ICD-10-CM

## 2018-09-28 DIAGNOSIS — R53.1 WEAKNESS GENERALIZED: ICD-10-CM

## 2018-09-28 DIAGNOSIS — M79.2 NEUROPATHIC PAIN: ICD-10-CM

## 2018-09-28 DIAGNOSIS — R06.02 SOB (SHORTNESS OF BREATH): ICD-10-CM

## 2018-09-28 DIAGNOSIS — G62.9 NEUROPATHY: ICD-10-CM

## 2018-09-28 DIAGNOSIS — R56.9 SEIZURES (HCC): ICD-10-CM

## 2018-09-28 DIAGNOSIS — Z74.09 IMPAIRED MOBILITY AND ADLS: ICD-10-CM

## 2018-09-28 DIAGNOSIS — I25.2 HISTORY OF MYOCARDIAL INFARCTION: Chronic | ICD-10-CM

## 2018-09-28 DIAGNOSIS — R26.81 GAIT INSTABILITY: ICD-10-CM

## 2018-09-28 DIAGNOSIS — I67.9 CEREBROVASCULAR DISEASE: ICD-10-CM

## 2018-09-28 DIAGNOSIS — Z86.79 HISTORY OF HEART FAILURE: ICD-10-CM

## 2018-09-28 DIAGNOSIS — Z78.9 IMPAIRED MOBILITY AND ADLS: ICD-10-CM

## 2018-09-28 DIAGNOSIS — Z86.73 HISTORY OF TIA (TRANSIENT ISCHEMIC ATTACK): ICD-10-CM

## 2018-09-28 DIAGNOSIS — Z99.81 OXYGEN DEPENDENT: ICD-10-CM

## 2018-09-28 DIAGNOSIS — H53.9 VISION DISTURBANCE: ICD-10-CM

## 2018-09-28 PROCEDURE — 94010 BREATHING CAPACITY TEST: CPT

## 2018-09-28 PROCEDURE — 94727 GAS DIL/WSHOT DETER LNG VOL: CPT

## 2018-09-28 PROCEDURE — 94729 DIFFUSING CAPACITY: CPT | Performed by: INTERNAL MEDICINE

## 2018-09-28 PROCEDURE — 94010 BREATHING CAPACITY TEST: CPT | Performed by: INTERNAL MEDICINE

## 2018-10-15 ENCOUNTER — HOSPITAL ENCOUNTER (OUTPATIENT)
Facility: HOSPITAL | Age: 43
Discharge: HOME | End: 2018-10-18
Attending: EMERGENCY MEDICINE | Admitting: INTERNAL MEDICINE

## 2018-10-15 ENCOUNTER — APPOINTMENT (OUTPATIENT)
Dept: GENERAL RADIOLOGY | Facility: HOSPITAL | Age: 43
End: 2018-10-15

## 2018-10-15 DIAGNOSIS — R77.8 ELEVATED TROPONIN: ICD-10-CM

## 2018-10-15 DIAGNOSIS — R74.8 ABNORMAL CARDIAC ENZYME LEVEL: ICD-10-CM

## 2018-10-15 DIAGNOSIS — R55 SYNCOPE, UNSPECIFIED SYNCOPE TYPE: Primary | ICD-10-CM

## 2018-10-15 DIAGNOSIS — I25.10 CORONARY ARTERY DISEASE INVOLVING NATIVE CORONARY ARTERY OF NATIVE HEART WITHOUT ANGINA PECTORIS: ICD-10-CM

## 2018-10-15 DIAGNOSIS — R77.8 ELEVATED TROPONIN I LEVEL: ICD-10-CM

## 2018-10-15 LAB
ALBUMIN SERPL-MCNC: 4.2 G/DL (ref 3.5–5)
ALBUMIN/GLOB SERPL: 1.4 G/DL (ref 1–2)
ALP SERPL-CCNC: 59 U/L (ref 38–126)
ALT SERPL W P-5'-P-CCNC: 65 U/L (ref 13–69)
AMPHET+METHAMPHET UR QL: NEGATIVE
AMPHETAMINES UR QL: NEGATIVE
ANION GAP SERPL CALCULATED.3IONS-SCNC: 10.1 MMOL/L (ref 10–20)
APAP SERPL-MCNC: <10 MCG/ML
AST SERPL-CCNC: 59 U/L (ref 15–46)
BARBITURATES UR QL SCN: NEGATIVE
BASOPHILS # BLD AUTO: 0.09 10*3/MM3 (ref 0–0.2)
BASOPHILS NFR BLD AUTO: 0.5 % (ref 0–2.5)
BENZODIAZ UR QL SCN: NEGATIVE
BILIRUB SERPL-MCNC: 0.3 MG/DL (ref 0.2–1.3)
BUN BLD-MCNC: 15 MG/DL (ref 7–20)
BUN/CREAT SERPL: 16.7 (ref 6.3–21.9)
BUPRENORPHINE SERPL-MCNC: NEGATIVE NG/ML
CALCIUM SPEC-SCNC: 9 MG/DL (ref 8.4–10.2)
CANNABINOIDS SERPL QL: NEGATIVE
CHLORIDE SERPL-SCNC: 103 MMOL/L (ref 98–107)
CO2 SERPL-SCNC: 25 MMOL/L (ref 26–30)
COCAINE UR QL: NEGATIVE
CREAT BLD-MCNC: 0.9 MG/DL (ref 0.6–1.3)
DEPRECATED RDW RBC AUTO: 47.5 FL (ref 37–54)
EOSINOPHIL # BLD AUTO: 0.21 10*3/MM3 (ref 0–0.7)
EOSINOPHIL NFR BLD AUTO: 1.1 % (ref 0–7)
ERYTHROCYTE [DISTWIDTH] IN BLOOD BY AUTOMATED COUNT: 15.5 % (ref 11.5–14.5)
ETHANOL BLD-MCNC: <10 MG/DL
ETHANOL UR QL: <0.01 %
GFR SERPL CREATININE-BSD FRML MDRD: 92 ML/MIN/1.73
GLOBULIN UR ELPH-MCNC: 2.9 GM/DL
GLUCOSE BLD-MCNC: 93 MG/DL (ref 74–98)
HCT VFR BLD AUTO: 49.5 % (ref 42–52)
HGB BLD-MCNC: 16 G/DL (ref 14–18)
IMM GRANULOCYTES # BLD: 0.22 10*3/MM3 (ref 0–0.06)
IMM GRANULOCYTES NFR BLD: 1.1 % (ref 0–0.6)
LYMPHOCYTES # BLD AUTO: 1.31 10*3/MM3 (ref 0.6–3.4)
LYMPHOCYTES NFR BLD AUTO: 6.6 % (ref 10–50)
MCH RBC QN AUTO: 27.6 PG (ref 27–31)
MCHC RBC AUTO-ENTMCNC: 32.3 G/DL (ref 30–37)
MCV RBC AUTO: 85.3 FL (ref 80–94)
METHADONE UR QL SCN: NEGATIVE
MONOCYTES # BLD AUTO: 1.1 10*3/MM3 (ref 0–0.9)
MONOCYTES NFR BLD AUTO: 5.5 % (ref 0–12)
NEUTROPHILS # BLD AUTO: 17.06 10*3/MM3 (ref 2–6.9)
NEUTROPHILS NFR BLD AUTO: 85.2 % (ref 37–80)
NRBC BLD MANUAL-RTO: 0 /100 WBC (ref 0–0)
NT-PROBNP SERPL-MCNC: 167 PG/ML (ref 0–125)
OPIATES UR QL: POSITIVE
OXYCODONE UR QL SCN: NEGATIVE
PCP UR QL SCN: NEGATIVE
PLATELET # BLD AUTO: 199 10*3/MM3 (ref 130–400)
PMV BLD AUTO: 9.6 FL (ref 6–12)
POTASSIUM BLD-SCNC: 4.1 MMOL/L (ref 3.5–5.1)
PROPOXYPH UR QL: NEGATIVE
PROT SERPL-MCNC: 7.1 G/DL (ref 6.3–8.2)
RBC # BLD AUTO: 5.8 10*6/MM3 (ref 4.7–6.1)
SALICYLATES SERPL-MCNC: <1 MG/DL (ref 2.8–20)
SODIUM BLD-SCNC: 134 MMOL/L (ref 137–145)
TRICYCLICS UR QL SCN: NEGATIVE
TROPONIN I SERPL-MCNC: 0.03 NG/ML (ref 0–0.03)
TROPONIN I SERPL-MCNC: 0.06 NG/ML (ref 0–0.03)
WBC NRBC COR # BLD: 19.99 10*3/MM3 (ref 4.8–10.8)

## 2018-10-15 PROCEDURE — 84484 ASSAY OF TROPONIN QUANT: CPT | Performed by: EMERGENCY MEDICINE

## 2018-10-15 PROCEDURE — 80053 COMPREHEN METABOLIC PANEL: CPT | Performed by: EMERGENCY MEDICINE

## 2018-10-15 PROCEDURE — 99285 EMERGENCY DEPT VISIT HI MDM: CPT

## 2018-10-15 PROCEDURE — 80306 DRUG TEST PRSMV INSTRMNT: CPT | Performed by: EMERGENCY MEDICINE

## 2018-10-15 PROCEDURE — G0378 HOSPITAL OBSERVATION PER HR: HCPCS

## 2018-10-15 PROCEDURE — 93005 ELECTROCARDIOGRAM TRACING: CPT | Performed by: EMERGENCY MEDICINE

## 2018-10-15 PROCEDURE — P9612 CATHETERIZE FOR URINE SPEC: HCPCS

## 2018-10-15 PROCEDURE — 80307 DRUG TEST PRSMV CHEM ANLYZR: CPT | Performed by: EMERGENCY MEDICINE

## 2018-10-15 PROCEDURE — 85025 COMPLETE CBC W/AUTO DIFF WBC: CPT | Performed by: EMERGENCY MEDICINE

## 2018-10-15 PROCEDURE — 71046 X-RAY EXAM CHEST 2 VIEWS: CPT

## 2018-10-15 PROCEDURE — 83036 HEMOGLOBIN GLYCOSYLATED A1C: CPT | Performed by: INTERNAL MEDICINE

## 2018-10-15 PROCEDURE — 99219 PR INITIAL OBSERVATION CARE/DAY 50 MINUTES: CPT | Performed by: INTERNAL MEDICINE

## 2018-10-15 PROCEDURE — 83880 ASSAY OF NATRIURETIC PEPTIDE: CPT | Performed by: EMERGENCY MEDICINE

## 2018-10-15 RX ORDER — SODIUM CHLORIDE 0.9 % (FLUSH) 0.9 %
3-10 SYRINGE (ML) INJECTION AS NEEDED
Status: DISCONTINUED | OUTPATIENT
Start: 2018-10-15 | End: 2018-10-18 | Stop reason: HOSPADM

## 2018-10-15 RX ORDER — AMLODIPINE BESYLATE 5 MG/1
10 TABLET ORAL DAILY
Status: DISCONTINUED | OUTPATIENT
Start: 2018-10-16 | End: 2018-10-18 | Stop reason: HOSPADM

## 2018-10-15 RX ORDER — ONDANSETRON 2 MG/ML
4 INJECTION INTRAMUSCULAR; INTRAVENOUS EVERY 6 HOURS PRN
Status: DISCONTINUED | OUTPATIENT
Start: 2018-10-15 | End: 2018-10-17 | Stop reason: SDUPTHER

## 2018-10-15 RX ORDER — ASPIRIN 81 MG/1
324 TABLET, CHEWABLE ORAL ONCE
Status: COMPLETED | OUTPATIENT
Start: 2018-10-15 | End: 2018-10-15

## 2018-10-15 RX ORDER — NICOTINE 21 MG/24HR
1 PATCH, TRANSDERMAL 24 HOURS TRANSDERMAL EVERY 24 HOURS
Status: DISCONTINUED | OUTPATIENT
Start: 2018-10-16 | End: 2018-10-18 | Stop reason: HOSPADM

## 2018-10-15 RX ORDER — DULOXETIN HYDROCHLORIDE 20 MG/1
20 CAPSULE, DELAYED RELEASE ORAL DAILY
Status: DISCONTINUED | OUTPATIENT
Start: 2018-10-16 | End: 2018-10-18 | Stop reason: HOSPADM

## 2018-10-15 RX ORDER — ACETAMINOPHEN 325 MG/1
650 TABLET ORAL EVERY 4 HOURS PRN
Status: DISCONTINUED | OUTPATIENT
Start: 2018-10-15 | End: 2018-10-18 | Stop reason: HOSPADM

## 2018-10-15 RX ORDER — ASPIRIN 81 MG/1
81 TABLET ORAL DAILY
Status: DISCONTINUED | OUTPATIENT
Start: 2018-10-16 | End: 2018-10-17 | Stop reason: SDUPTHER

## 2018-10-15 RX ORDER — FAMOTIDINE 20 MG/1
40 TABLET, FILM COATED ORAL DAILY
Status: DISCONTINUED | OUTPATIENT
Start: 2018-10-16 | End: 2018-10-18 | Stop reason: HOSPADM

## 2018-10-15 RX ORDER — CARVEDILOL 25 MG/1
25 TABLET ORAL 2 TIMES DAILY WITH MEALS
Status: DISCONTINUED | OUTPATIENT
Start: 2018-10-16 | End: 2018-10-16

## 2018-10-15 RX ORDER — SODIUM CHLORIDE 0.9 % (FLUSH) 0.9 %
3 SYRINGE (ML) INJECTION EVERY 12 HOURS SCHEDULED
Status: DISCONTINUED | OUTPATIENT
Start: 2018-10-16 | End: 2018-10-18 | Stop reason: HOSPADM

## 2018-10-15 RX ORDER — IPRATROPIUM BROMIDE AND ALBUTEROL SULFATE 2.5; .5 MG/3ML; MG/3ML
3 SOLUTION RESPIRATORY (INHALATION) EVERY 4 HOURS PRN
Status: DISCONTINUED | OUTPATIENT
Start: 2018-10-15 | End: 2018-10-18 | Stop reason: HOSPADM

## 2018-10-15 RX ORDER — ONDANSETRON 4 MG/1
4 TABLET, FILM COATED ORAL EVERY 6 HOURS PRN
Status: DISCONTINUED | OUTPATIENT
Start: 2018-10-15 | End: 2018-10-17 | Stop reason: SDUPTHER

## 2018-10-15 RX ORDER — HYDROCODONE BITARTRATE AND ACETAMINOPHEN 7.5; 325 MG/1; MG/1
1 TABLET ORAL EVERY 6 HOURS PRN
Status: DISCONTINUED | OUTPATIENT
Start: 2018-10-16 | End: 2018-10-17 | Stop reason: SDUPTHER

## 2018-10-15 RX ORDER — LISINOPRIL 20 MG/1
20 TABLET ORAL DAILY
Status: DISCONTINUED | OUTPATIENT
Start: 2018-10-16 | End: 2018-10-16

## 2018-10-15 RX ORDER — SODIUM CHLORIDE 9 MG/ML
100 INJECTION, SOLUTION INTRAVENOUS CONTINUOUS
Status: ACTIVE | OUTPATIENT
Start: 2018-10-16 | End: 2018-10-16

## 2018-10-15 RX ORDER — ONDANSETRON 4 MG/1
4 TABLET, ORALLY DISINTEGRATING ORAL EVERY 6 HOURS PRN
Status: DISCONTINUED | OUTPATIENT
Start: 2018-10-15 | End: 2018-10-18 | Stop reason: HOSPADM

## 2018-10-15 RX ORDER — ALUMINA, MAGNESIA, AND SIMETHICONE 2400; 2400; 240 MG/30ML; MG/30ML; MG/30ML
15 SUSPENSION ORAL EVERY 6 HOURS PRN
Status: DISCONTINUED | OUTPATIENT
Start: 2018-10-15 | End: 2018-10-17 | Stop reason: SDUPTHER

## 2018-10-15 RX ADMIN — ASPIRIN 81 MG 324 MG: 81 TABLET ORAL at 22:27

## 2018-10-16 ENCOUNTER — APPOINTMENT (OUTPATIENT)
Dept: CARDIOLOGY | Facility: HOSPITAL | Age: 43
End: 2018-10-16
Attending: INTERNAL MEDICINE

## 2018-10-16 PROBLEM — T50.901A ACCIDENTAL DRUG OVERDOSE: Status: ACTIVE | Noted: 2018-10-16

## 2018-10-16 PROBLEM — K52.9 GASTROENTERITIS: Status: ACTIVE | Noted: 2018-10-16

## 2018-10-16 PROBLEM — R77.8 ELEVATED TROPONIN I LEVEL: Status: ACTIVE | Noted: 2018-10-16

## 2018-10-16 PROBLEM — R74.8 ABNORMAL CARDIAC ENZYME LEVEL: Status: ACTIVE | Noted: 2018-10-15

## 2018-10-16 PROBLEM — R79.89 ELEVATED TROPONIN I LEVEL: Status: ACTIVE | Noted: 2018-10-16

## 2018-10-16 LAB
ANION GAP SERPL CALCULATED.3IONS-SCNC: 10.2 MMOL/L (ref 10–20)
BH CV ECHO MEAS - IVSD: 1.2 CM
BH CV ECHO MEAS - LA DIMENSION: 4.7 CM
BH CV ECHO MEAS - LVPWD: 1.2 CM
BUN BLD-MCNC: 16 MG/DL (ref 7–20)
BUN/CREAT SERPL: 20 (ref 6.3–21.9)
CALCIUM SPEC-SCNC: 8.8 MG/DL (ref 8.4–10.2)
CHLORIDE SERPL-SCNC: 105 MMOL/L (ref 98–107)
CHOLEST SERPL-MCNC: 180 MG/DL (ref 0–199)
CO2 SERPL-SCNC: 26 MMOL/L (ref 26–30)
CREAT BLD-MCNC: 0.8 MG/DL (ref 0.6–1.3)
DEPRECATED RDW RBC AUTO: 47.8 FL (ref 37–54)
ERYTHROCYTE [DISTWIDTH] IN BLOOD BY AUTOMATED COUNT: 15.9 % (ref 11.5–14.5)
GFR SERPL CREATININE-BSD FRML MDRD: 106 ML/MIN/1.73
GLUCOSE BLD-MCNC: 78 MG/DL (ref 74–98)
HBA1C MFR BLD: 5.4 % (ref 3–6)
HCT VFR BLD AUTO: 50.1 % (ref 42–52)
HDLC SERPL-MCNC: 32 MG/DL (ref 40–60)
HGB BLD-MCNC: 16.4 G/DL (ref 14–18)
LDLC SERPL CALC-MCNC: 97 MG/DL (ref 0–99)
LDLC/HDLC SERPL: 3.02 {RATIO}
LEFT ATRIUM VOLUME INDEX: 42 ML/M2
LV EF 2D ECHO EST: 67 %
MAXIMAL PREDICTED HEART RATE: 177 BPM
MCH RBC QN AUTO: 27.7 PG (ref 27–31)
MCHC RBC AUTO-ENTMCNC: 32.7 G/DL (ref 30–37)
MCV RBC AUTO: 84.8 FL (ref 80–94)
PLATELET # BLD AUTO: 170 10*3/MM3 (ref 130–400)
PMV BLD AUTO: 10.2 FL (ref 6–12)
POTASSIUM BLD-SCNC: 4.2 MMOL/L (ref 3.5–5.1)
RBC # BLD AUTO: 5.91 10*6/MM3 (ref 4.7–6.1)
SODIUM BLD-SCNC: 137 MMOL/L (ref 137–145)
STRESS TARGET HR: 150 BPM
TRIGL SERPL-MCNC: 257 MG/DL
TROPONIN I SERPL-MCNC: 0.03 NG/ML (ref 0–0.03)
TROPONIN I SERPL-MCNC: 0.03 NG/ML (ref 0–0.03)
TROPONIN I SERPL-MCNC: 0.05 NG/ML (ref 0–0.03)
TROPONIN I SERPL-MCNC: 0.07 NG/ML (ref 0–0.03)
VLDLC SERPL-MCNC: 51.4 MG/DL
WBC NRBC COR # BLD: 11.33 10*3/MM3 (ref 4.8–10.8)

## 2018-10-16 PROCEDURE — 96374 THER/PROPH/DIAG INJ IV PUSH: CPT

## 2018-10-16 PROCEDURE — G0378 HOSPITAL OBSERVATION PER HR: HCPCS

## 2018-10-16 PROCEDURE — 96372 THER/PROPH/DIAG INJ SC/IM: CPT

## 2018-10-16 PROCEDURE — 25010000002 MORPHINE SULFATE (PF) 2 MG/ML SOLUTION: Performed by: INTERNAL MEDICINE

## 2018-10-16 PROCEDURE — 84484 ASSAY OF TROPONIN QUANT: CPT | Performed by: INTERNAL MEDICINE

## 2018-10-16 PROCEDURE — 96361 HYDRATE IV INFUSION ADD-ON: CPT

## 2018-10-16 PROCEDURE — 99225 PR SBSQ OBSERVATION CARE/DAY 25 MINUTES: CPT | Performed by: NURSE PRACTITIONER

## 2018-10-16 PROCEDURE — 96375 TX/PRO/DX INJ NEW DRUG ADDON: CPT

## 2018-10-16 PROCEDURE — 85027 COMPLETE CBC AUTOMATED: CPT | Performed by: INTERNAL MEDICINE

## 2018-10-16 PROCEDURE — 80061 LIPID PANEL: CPT | Performed by: INTERNAL MEDICINE

## 2018-10-16 PROCEDURE — 96376 TX/PRO/DX INJ SAME DRUG ADON: CPT

## 2018-10-16 PROCEDURE — 80048 BASIC METABOLIC PNL TOTAL CA: CPT | Performed by: INTERNAL MEDICINE

## 2018-10-16 PROCEDURE — 25010000002 PROMETHAZINE PER 50 MG: Performed by: INTERNAL MEDICINE

## 2018-10-16 PROCEDURE — 93005 ELECTROCARDIOGRAM TRACING: CPT | Performed by: INTERNAL MEDICINE

## 2018-10-16 PROCEDURE — 25010000002 MORPHINE PER 10 MG: Performed by: INTERNAL MEDICINE

## 2018-10-16 PROCEDURE — 99204 OFFICE O/P NEW MOD 45 MIN: CPT | Performed by: INTERNAL MEDICINE

## 2018-10-16 PROCEDURE — 93306 TTE W/DOPPLER COMPLETE: CPT | Performed by: INTERNAL MEDICINE

## 2018-10-16 PROCEDURE — 93306 TTE W/DOPPLER COMPLETE: CPT

## 2018-10-16 PROCEDURE — 25010000002 ENOXAPARIN PER 10 MG: Performed by: INTERNAL MEDICINE

## 2018-10-16 RX ORDER — NITROGLYCERIN 0.4 MG/1
0.4 TABLET SUBLINGUAL
Status: DISCONTINUED | OUTPATIENT
Start: 2018-10-16 | End: 2018-10-18 | Stop reason: HOSPADM

## 2018-10-16 RX ORDER — ASPIRIN 81 MG/1
324 TABLET, CHEWABLE ORAL ONCE
Status: COMPLETED | OUTPATIENT
Start: 2018-10-16 | End: 2018-10-16

## 2018-10-16 RX ORDER — MORPHINE SULFATE 2 MG/ML
2 INJECTION, SOLUTION INTRAMUSCULAR; INTRAVENOUS ONCE
Status: COMPLETED | OUTPATIENT
Start: 2018-10-16 | End: 2018-10-16

## 2018-10-16 RX ORDER — HYDRALAZINE HYDROCHLORIDE 20 MG/ML
10 INJECTION INTRAMUSCULAR; INTRAVENOUS EVERY 6 HOURS PRN
Status: DISCONTINUED | OUTPATIENT
Start: 2018-10-16 | End: 2018-10-18 | Stop reason: HOSPADM

## 2018-10-16 RX ORDER — PROMETHAZINE HYDROCHLORIDE 25 MG/ML
12.5 INJECTION, SOLUTION INTRAMUSCULAR; INTRAVENOUS EVERY 6 HOURS PRN
Status: DISCONTINUED | OUTPATIENT
Start: 2018-10-16 | End: 2018-10-18 | Stop reason: HOSPADM

## 2018-10-16 RX ORDER — LISINOPRIL 20 MG/1
20 TABLET ORAL EVERY 12 HOURS SCHEDULED
Status: DISCONTINUED | OUTPATIENT
Start: 2018-10-16 | End: 2018-10-17 | Stop reason: DRUGHIGH

## 2018-10-16 RX ORDER — MORPHINE SULFATE 4 MG/ML
4 INJECTION, SOLUTION INTRAMUSCULAR; INTRAVENOUS ONCE
Status: COMPLETED | OUTPATIENT
Start: 2018-10-16 | End: 2018-10-16

## 2018-10-16 RX ORDER — NITROGLYCERIN 0.4 MG/1
TABLET SUBLINGUAL
Status: COMPLETED
Start: 2018-10-16 | End: 2018-10-16

## 2018-10-16 RX ADMIN — PROMETHAZINE HYDROCHLORIDE 12.5 MG: 25 INJECTION INTRAMUSCULAR; INTRAVENOUS at 17:38

## 2018-10-16 RX ADMIN — ASPIRIN 81 MG 324 MG: 81 TABLET ORAL at 16:50

## 2018-10-16 RX ADMIN — NITROGLYCERIN 0.4 MG: 0.4 TABLET SUBLINGUAL at 16:42

## 2018-10-16 RX ADMIN — SODIUM CHLORIDE 100 ML/HR: 9 INJECTION, SOLUTION INTRAVENOUS at 12:08

## 2018-10-16 RX ADMIN — LISINOPRIL 20 MG: 20 TABLET ORAL at 21:01

## 2018-10-16 RX ADMIN — CARVEDILOL 37.5 MG: 25 TABLET, FILM COATED ORAL at 16:50

## 2018-10-16 RX ADMIN — ENOXAPARIN SODIUM 40 MG: 40 INJECTION SUBCUTANEOUS at 00:33

## 2018-10-16 RX ADMIN — CARVEDILOL 25 MG: 25 TABLET, FILM COATED ORAL at 09:11

## 2018-10-16 RX ADMIN — Medication 3 ML: at 21:01

## 2018-10-16 RX ADMIN — DULOXETINE HYDROCHLORIDE 20 MG: 20 CAPSULE, DELAYED RELEASE ORAL at 09:10

## 2018-10-16 RX ADMIN — NITROGLYCERIN 0.4 MG: 0.4 TABLET SUBLINGUAL at 16:47

## 2018-10-16 RX ADMIN — MORPHINE SULFATE 2 MG: 2 INJECTION, SOLUTION INTRAMUSCULAR; INTRAVENOUS at 17:19

## 2018-10-16 RX ADMIN — SODIUM CHLORIDE 100 ML/HR: 9 INJECTION, SOLUTION INTRAVENOUS at 00:33

## 2018-10-16 RX ADMIN — NITROGLYCERIN 0.4 MG: 0.4 TABLET SUBLINGUAL at 16:52

## 2018-10-16 RX ADMIN — LISINOPRIL 20 MG: 20 TABLET ORAL at 09:11

## 2018-10-16 RX ADMIN — ASPIRIN 81 MG: 81 TABLET, COATED ORAL at 09:11

## 2018-10-16 RX ADMIN — Medication 3 ML: at 09:13

## 2018-10-16 RX ADMIN — MORPHINE SULFATE 4 MG: 4 INJECTION INTRAVENOUS at 21:06

## 2018-10-16 RX ADMIN — NICOTINE 1 PATCH: 21 PATCH TRANSDERMAL at 00:33

## 2018-10-16 RX ADMIN — HYDROCODONE BITARTRATE AND ACETAMINOPHEN 1 TABLET: 7.5; 325 TABLET ORAL at 12:08

## 2018-10-16 RX ADMIN — AMLODIPINE BESYLATE 10 MG: 5 TABLET ORAL at 09:12

## 2018-10-16 RX ADMIN — FAMOTIDINE 40 MG: 20 TABLET, FILM COATED ORAL at 09:11

## 2018-10-17 LAB
ACT BLD: 245 SECONDS (ref 82–152)
ANION GAP SERPL CALCULATED.3IONS-SCNC: 8.8 MMOL/L (ref 10–20)
BUN BLD-MCNC: 15 MG/DL (ref 7–20)
BUN/CREAT SERPL: 16.7 (ref 6.3–21.9)
CALCIUM SPEC-SCNC: 9 MG/DL (ref 8.4–10.2)
CHLORIDE SERPL-SCNC: 108 MMOL/L (ref 98–107)
CO2 SERPL-SCNC: 26 MMOL/L (ref 26–30)
CREAT BLD-MCNC: 0.9 MG/DL (ref 0.6–1.3)
DEPRECATED RDW RBC AUTO: 47.8 FL (ref 37–54)
ERYTHROCYTE [DISTWIDTH] IN BLOOD BY AUTOMATED COUNT: 15.2 % (ref 11.5–14.5)
GFR SERPL CREATININE-BSD FRML MDRD: 92 ML/MIN/1.73
GLUCOSE BLD-MCNC: 88 MG/DL (ref 74–98)
HCT VFR BLD AUTO: 48.6 % (ref 42–52)
HGB BLD-MCNC: 15.5 G/DL (ref 14–18)
MCH RBC QN AUTO: 27.4 PG (ref 27–31)
MCHC RBC AUTO-ENTMCNC: 31.9 G/DL (ref 30–37)
MCV RBC AUTO: 86 FL (ref 80–94)
PLATELET # BLD AUTO: 180 10*3/MM3 (ref 130–400)
PMV BLD AUTO: 9.5 FL (ref 6–12)
POTASSIUM BLD-SCNC: 3.8 MMOL/L (ref 3.5–5.1)
RBC # BLD AUTO: 5.65 10*6/MM3 (ref 4.7–6.1)
SODIUM BLD-SCNC: 139 MMOL/L (ref 137–145)
WBC NRBC COR # BLD: 9.05 10*3/MM3 (ref 4.8–10.8)

## 2018-10-17 PROCEDURE — A9270 NON-COVERED ITEM OR SERVICE: HCPCS | Performed by: INTERNAL MEDICINE

## 2018-10-17 PROCEDURE — 92928 PRQ TCAT PLMT NTRAC ST 1 LES: CPT | Performed by: INTERNAL MEDICINE

## 2018-10-17 PROCEDURE — C1769 GUIDE WIRE: HCPCS | Performed by: INTERNAL MEDICINE

## 2018-10-17 PROCEDURE — 25010000002 MIDAZOLAM PER 1 MG: Performed by: INTERNAL MEDICINE

## 2018-10-17 PROCEDURE — 0 IOPAMIDOL PER 1 ML: Performed by: INTERNAL MEDICINE

## 2018-10-17 PROCEDURE — 85027 COMPLETE CBC AUTOMATED: CPT | Performed by: NURSE PRACTITIONER

## 2018-10-17 PROCEDURE — C1874 STENT, COATED/COV W/DEL SYS: HCPCS | Performed by: INTERNAL MEDICINE

## 2018-10-17 PROCEDURE — C1887 CATHETER, GUIDING: HCPCS | Performed by: INTERNAL MEDICINE

## 2018-10-17 PROCEDURE — 25010000002 MORPHINE SULFATE (PF) 2 MG/ML SOLUTION: Performed by: INTERNAL MEDICINE

## 2018-10-17 PROCEDURE — 63710000001 FAMOTIDINE 20 MG TABLET: Performed by: INTERNAL MEDICINE

## 2018-10-17 PROCEDURE — 25010000002 FENTANYL CITRATE (PF) 100 MCG/2ML SOLUTION: Performed by: INTERNAL MEDICINE

## 2018-10-17 PROCEDURE — G0378 HOSPITAL OBSERVATION PER HR: HCPCS

## 2018-10-17 PROCEDURE — 63710000001 POLYETHYLENE GLYCOL PACK: Performed by: INTERNAL MEDICINE

## 2018-10-17 PROCEDURE — 99225 PR SBSQ OBSERVATION CARE/DAY 25 MINUTES: CPT | Performed by: NURSE PRACTITIONER

## 2018-10-17 PROCEDURE — 25010000002 HEPARIN (PORCINE) PER 1000 UNITS: Performed by: INTERNAL MEDICINE

## 2018-10-17 PROCEDURE — 63710000001 DULOXETINE 20 MG CAPSULE DELAYED-RELEASE PARTICLES: Performed by: INTERNAL MEDICINE

## 2018-10-17 PROCEDURE — C1894 INTRO/SHEATH, NON-LASER: HCPCS | Performed by: INTERNAL MEDICINE

## 2018-10-17 PROCEDURE — 25010000002 PROMETHAZINE PER 50 MG: Performed by: INTERNAL MEDICINE

## 2018-10-17 PROCEDURE — 63710000001 DOCUSATE SODIUM 100 MG CAPSULE: Performed by: INTERNAL MEDICINE

## 2018-10-17 PROCEDURE — 80048 BASIC METABOLIC PNL TOTAL CA: CPT | Performed by: NURSE PRACTITIONER

## 2018-10-17 PROCEDURE — 63710000001 CARVEDILOL 25 MG TABLET: Performed by: INTERNAL MEDICINE

## 2018-10-17 PROCEDURE — 93454 CORONARY ARTERY ANGIO S&I: CPT | Performed by: INTERNAL MEDICINE

## 2018-10-17 PROCEDURE — 63710000001 AMLODIPINE 5 MG TABLET: Performed by: INTERNAL MEDICINE

## 2018-10-17 PROCEDURE — 63710000001 SENNOSIDES-DOCUSATE SODIUM 8.6-50 MG TABLET: Performed by: INTERNAL MEDICINE

## 2018-10-17 PROCEDURE — 63710000001 HYDROCODONE-ACETAMINOPHEN 7.5-325 MG TABLET: Performed by: INTERNAL MEDICINE

## 2018-10-17 PROCEDURE — 94799 UNLISTED PULMONARY SVC/PX: CPT

## 2018-10-17 PROCEDURE — 96376 TX/PRO/DX INJ SAME DRUG ADON: CPT

## 2018-10-17 PROCEDURE — 94762 N-INVAS EAR/PLS OXIMTRY CONT: CPT

## 2018-10-17 PROCEDURE — 85347 COAGULATION TIME ACTIVATED: CPT

## 2018-10-17 PROCEDURE — 63710000001 TICAGRELOR 90 MG TABLET: Performed by: INTERNAL MEDICINE

## 2018-10-17 PROCEDURE — 63710000001 ATORVASTATIN 80 MG TABLET: Performed by: INTERNAL MEDICINE

## 2018-10-17 PROCEDURE — C9600 PERC DRUG-EL COR STENT SING: HCPCS | Performed by: INTERNAL MEDICINE

## 2018-10-17 PROCEDURE — 63710000001 LISINOPRIL 20 MG TABLET: Performed by: INTERNAL MEDICINE

## 2018-10-17 DEVICE — XIENCE SIERRA™ EVEROLIMUS ELUTING CORONARY STENT SYSTEM 2.50 MM X 12 MM / RAPID-EXCHANGE
Type: IMPLANTABLE DEVICE | Status: FUNCTIONAL
Brand: XIENCE SIERRA™

## 2018-10-17 RX ORDER — NALOXONE HCL 0.4 MG/ML
0.4 VIAL (ML) INJECTION
Status: DISCONTINUED | OUTPATIENT
Start: 2018-10-17 | End: 2018-10-17

## 2018-10-17 RX ORDER — SODIUM CHLORIDE 9 MG/ML
INJECTION, SOLUTION INTRAVENOUS
Status: COMPLETED
Start: 2018-10-17 | End: 2018-10-17

## 2018-10-17 RX ORDER — ASPIRIN 81 MG/1
TABLET, CHEWABLE ORAL AS NEEDED
Status: DISCONTINUED | OUTPATIENT
Start: 2018-10-17 | End: 2018-10-17 | Stop reason: HOSPADM

## 2018-10-17 RX ORDER — ASPIRIN 81 MG/1
81 TABLET, CHEWABLE ORAL DAILY
Status: DISCONTINUED | OUTPATIENT
Start: 2018-10-17 | End: 2018-10-18 | Stop reason: HOSPADM

## 2018-10-17 RX ORDER — LISINOPRIL 20 MG/1
40 TABLET ORAL EVERY 12 HOURS SCHEDULED
Status: DISCONTINUED | OUTPATIENT
Start: 2018-10-17 | End: 2018-10-18 | Stop reason: HOSPADM

## 2018-10-17 RX ORDER — SODIUM CHLORIDE 9 MG/ML
100 INJECTION, SOLUTION INTRAVENOUS CONTINUOUS
Status: DISCONTINUED | OUTPATIENT
Start: 2018-10-17 | End: 2018-10-18 | Stop reason: HOSPADM

## 2018-10-17 RX ORDER — TEMAZEPAM 15 MG/1
15 CAPSULE ORAL NIGHTLY PRN
Status: DISCONTINUED | OUTPATIENT
Start: 2018-10-17 | End: 2018-10-18 | Stop reason: HOSPADM

## 2018-10-17 RX ORDER — BISACODYL 10 MG
10 SUPPOSITORY, RECTAL RECTAL DAILY PRN
Status: DISCONTINUED | OUTPATIENT
Start: 2018-10-17 | End: 2018-10-18 | Stop reason: HOSPADM

## 2018-10-17 RX ORDER — DIPHENHYDRAMINE HCL 25 MG
25 CAPSULE ORAL EVERY 6 HOURS PRN
Status: DISCONTINUED | OUTPATIENT
Start: 2018-10-17 | End: 2018-10-18 | Stop reason: HOSPADM

## 2018-10-17 RX ORDER — MORPHINE SULFATE 2 MG/ML
8 INJECTION, SOLUTION INTRAMUSCULAR; INTRAVENOUS
Status: DISCONTINUED | OUTPATIENT
Start: 2018-10-17 | End: 2018-10-17

## 2018-10-17 RX ORDER — ONDANSETRON 2 MG/ML
4 INJECTION INTRAMUSCULAR; INTRAVENOUS EVERY 6 HOURS PRN
Status: DISCONTINUED | OUTPATIENT
Start: 2018-10-17 | End: 2018-10-18 | Stop reason: HOSPADM

## 2018-10-17 RX ORDER — DOCUSATE SODIUM 100 MG/1
100 CAPSULE, LIQUID FILLED ORAL 2 TIMES DAILY
Status: DISCONTINUED | OUTPATIENT
Start: 2018-10-17 | End: 2018-10-18 | Stop reason: HOSPADM

## 2018-10-17 RX ORDER — MORPHINE SULFATE 2 MG/ML
2 INJECTION, SOLUTION INTRAMUSCULAR; INTRAVENOUS EVERY 4 HOURS PRN
Status: DISCONTINUED | OUTPATIENT
Start: 2018-10-17 | End: 2018-10-18 | Stop reason: HOSPADM

## 2018-10-17 RX ORDER — LIDOCAINE HYDROCHLORIDE 10 MG/ML
INJECTION, SOLUTION INFILTRATION; PERINEURAL AS NEEDED
Status: DISCONTINUED | OUTPATIENT
Start: 2018-10-17 | End: 2018-10-17 | Stop reason: HOSPADM

## 2018-10-17 RX ORDER — ALUMINA, MAGNESIA, AND SIMETHICONE 2400; 2400; 240 MG/30ML; MG/30ML; MG/30ML
15 SUSPENSION ORAL EVERY 6 HOURS PRN
Status: DISCONTINUED | OUTPATIENT
Start: 2018-10-17 | End: 2018-10-18 | Stop reason: HOSPADM

## 2018-10-17 RX ORDER — SODIUM CHLORIDE 9 MG/ML
INJECTION, SOLUTION INTRAVENOUS CONTINUOUS PRN
Status: COMPLETED | OUTPATIENT
Start: 2018-10-17 | End: 2018-10-17

## 2018-10-17 RX ORDER — ONDANSETRON 4 MG/1
4 TABLET, ORALLY DISINTEGRATING ORAL EVERY 6 HOURS PRN
Status: DISCONTINUED | OUTPATIENT
Start: 2018-10-17 | End: 2018-10-17 | Stop reason: SDUPTHER

## 2018-10-17 RX ORDER — CARVEDILOL 25 MG/1
50 TABLET ORAL 2 TIMES DAILY WITH MEALS
Status: DISCONTINUED | OUTPATIENT
Start: 2018-10-17 | End: 2018-10-18 | Stop reason: HOSPADM

## 2018-10-17 RX ORDER — MORPHINE SULFATE 2 MG/ML
2 INJECTION, SOLUTION INTRAMUSCULAR; INTRAVENOUS ONCE
Status: COMPLETED | OUTPATIENT
Start: 2018-10-17 | End: 2018-10-17

## 2018-10-17 RX ORDER — HYDROCODONE BITARTRATE AND ACETAMINOPHEN 7.5; 325 MG/1; MG/1
1 TABLET ORAL EVERY 4 HOURS PRN
Status: DISCONTINUED | OUTPATIENT
Start: 2018-10-17 | End: 2018-10-18 | Stop reason: HOSPADM

## 2018-10-17 RX ORDER — FENTANYL CITRATE 50 UG/ML
INJECTION, SOLUTION INTRAMUSCULAR; INTRAVENOUS AS NEEDED
Status: DISCONTINUED | OUTPATIENT
Start: 2018-10-17 | End: 2018-10-17 | Stop reason: HOSPADM

## 2018-10-17 RX ORDER — ALPRAZOLAM 0.5 MG/1
0.5 TABLET ORAL 3 TIMES DAILY PRN
Status: DISCONTINUED | OUTPATIENT
Start: 2018-10-17 | End: 2018-10-18 | Stop reason: HOSPADM

## 2018-10-17 RX ORDER — MIDAZOLAM HYDROCHLORIDE 1 MG/ML
INJECTION INTRAMUSCULAR; INTRAVENOUS AS NEEDED
Status: DISCONTINUED | OUTPATIENT
Start: 2018-10-17 | End: 2018-10-17 | Stop reason: HOSPADM

## 2018-10-17 RX ORDER — SENNA AND DOCUSATE SODIUM 50; 8.6 MG/1; MG/1
2 TABLET, FILM COATED ORAL NIGHTLY
Status: DISCONTINUED | OUTPATIENT
Start: 2018-10-17 | End: 2018-10-18 | Stop reason: HOSPADM

## 2018-10-17 RX ORDER — HEPARIN SODIUM 1000 [USP'U]/ML
INJECTION, SOLUTION INTRAVENOUS; SUBCUTANEOUS AS NEEDED
Status: DISCONTINUED | OUTPATIENT
Start: 2018-10-17 | End: 2018-10-17 | Stop reason: HOSPADM

## 2018-10-17 RX ORDER — ONDANSETRON 4 MG/1
4 TABLET, FILM COATED ORAL EVERY 6 HOURS PRN
Status: DISCONTINUED | OUTPATIENT
Start: 2018-10-17 | End: 2018-10-18 | Stop reason: HOSPADM

## 2018-10-17 RX ORDER — NALOXONE HCL 0.4 MG/ML
0.4 VIAL (ML) INJECTION
Status: DISCONTINUED | OUTPATIENT
Start: 2018-10-17 | End: 2018-10-18 | Stop reason: HOSPADM

## 2018-10-17 RX ORDER — ATORVASTATIN CALCIUM 80 MG/1
80 TABLET, FILM COATED ORAL NIGHTLY
Status: DISCONTINUED | OUTPATIENT
Start: 2018-10-17 | End: 2018-10-18 | Stop reason: HOSPADM

## 2018-10-17 RX ADMIN — ATORVASTATIN CALCIUM 80 MG: 80 TABLET, FILM COATED ORAL at 21:25

## 2018-10-17 RX ADMIN — HYDROCODONE BITARTRATE AND ACETAMINOPHEN 1 TABLET: 7.5; 325 TABLET ORAL at 01:30

## 2018-10-17 RX ADMIN — NICOTINE 1 PATCH: 21 PATCH TRANSDERMAL at 01:30

## 2018-10-17 RX ADMIN — Medication 2 TABLET: at 21:26

## 2018-10-17 RX ADMIN — MORPHINE SULFATE 2 MG: 2 INJECTION, SOLUTION INTRAMUSCULAR; INTRAVENOUS at 03:53

## 2018-10-17 RX ADMIN — POLYETHYLENE GLYCOL 3350 17 G: 17 POWDER, FOR SOLUTION ORAL at 10:07

## 2018-10-17 RX ADMIN — AMLODIPINE BESYLATE 10 MG: 5 TABLET ORAL at 08:53

## 2018-10-17 RX ADMIN — ALUMINUM HYDROXIDE, MAGNESIUM HYDROXIDE, AND DIMETHICONE 15 ML: 400; 400; 40 SUSPENSION ORAL at 01:58

## 2018-10-17 RX ADMIN — Medication 3 ML: at 21:29

## 2018-10-17 RX ADMIN — DULOXETINE HYDROCHLORIDE 20 MG: 20 CAPSULE, DELAYED RELEASE ORAL at 08:53

## 2018-10-17 RX ADMIN — CARVEDILOL 50 MG: 25 TABLET, FILM COATED ORAL at 17:07

## 2018-10-17 RX ADMIN — HYDROCODONE BITARTRATE AND ACETAMINOPHEN 1 TABLET: 7.5; 325 TABLET ORAL at 21:25

## 2018-10-17 RX ADMIN — LISINOPRIL 40 MG: 20 TABLET ORAL at 21:26

## 2018-10-17 RX ADMIN — SODIUM CHLORIDE: 900 INJECTION INTRAVENOUS at 02:00

## 2018-10-17 RX ADMIN — CARVEDILOL 50 MG: 25 TABLET, FILM COATED ORAL at 10:07

## 2018-10-17 RX ADMIN — FAMOTIDINE 40 MG: 20 TABLET, FILM COATED ORAL at 08:53

## 2018-10-17 RX ADMIN — NITROGLYCERIN 0.4 MG: 0.4 TABLET SUBLINGUAL at 02:04

## 2018-10-17 RX ADMIN — PROMETHAZINE HYDROCHLORIDE 12.5 MG: 25 INJECTION INTRAMUSCULAR; INTRAVENOUS at 02:00

## 2018-10-17 RX ADMIN — TICAGRELOR 90 MG: 90 TABLET ORAL at 21:26

## 2018-10-17 RX ADMIN — LISINOPRIL 40 MG: 20 TABLET ORAL at 10:07

## 2018-10-17 RX ADMIN — DOCUSATE SODIUM 100 MG: 100 CAPSULE, LIQUID FILLED ORAL at 21:25

## 2018-10-17 RX ADMIN — Medication 10 ML: at 03:53

## 2018-10-17 RX ADMIN — NITROGLYCERIN 0.4 MG: 0.4 TABLET SUBLINGUAL at 01:52

## 2018-10-17 RX ADMIN — HYDROCODONE BITARTRATE AND ACETAMINOPHEN 1 TABLET: 7.5; 325 TABLET ORAL at 17:11

## 2018-10-17 RX ADMIN — NITROGLYCERIN 0.4 MG: 0.4 TABLET SUBLINGUAL at 01:44

## 2018-10-17 RX ADMIN — SODIUM CHLORIDE 100 ML/HR: 9 INJECTION, SOLUTION INTRAVENOUS at 17:10

## 2018-10-17 RX ADMIN — DOCUSATE SODIUM 100 MG: 100 CAPSULE, LIQUID FILLED ORAL at 10:07

## 2018-10-18 VITALS
DIASTOLIC BLOOD PRESSURE: 83 MMHG | TEMPERATURE: 98.9 F | RESPIRATION RATE: 16 BRPM | WEIGHT: 200 LBS | BODY MASS INDEX: 32.14 KG/M2 | HEIGHT: 66 IN | HEART RATE: 70 BPM | SYSTOLIC BLOOD PRESSURE: 149 MMHG | OXYGEN SATURATION: 100 %

## 2018-10-18 LAB
ANION GAP SERPL CALCULATED.3IONS-SCNC: 8.8 MMOL/L (ref 10–20)
BUN BLD-MCNC: 13 MG/DL (ref 7–20)
BUN/CREAT SERPL: 16.3 (ref 6.3–21.9)
CALCIUM SPEC-SCNC: 8.9 MG/DL (ref 8.4–10.2)
CHLORIDE SERPL-SCNC: 109 MMOL/L (ref 98–107)
CHOLEST SERPL-MCNC: 179 MG/DL (ref 0–199)
CO2 SERPL-SCNC: 25 MMOL/L (ref 26–30)
CREAT BLD-MCNC: 0.8 MG/DL (ref 0.6–1.3)
DEPRECATED RDW RBC AUTO: 44.9 FL (ref 37–54)
ERYTHROCYTE [DISTWIDTH] IN BLOOD BY AUTOMATED COUNT: 14.9 % (ref 11.5–14.5)
GFR SERPL CREATININE-BSD FRML MDRD: 106 ML/MIN/1.73
GLUCOSE BLD-MCNC: 62 MG/DL (ref 74–98)
HCT VFR BLD AUTO: 44.5 % (ref 42–52)
HDLC SERPL-MCNC: 32 MG/DL (ref 40–60)
HGB BLD-MCNC: 14.8 G/DL (ref 14–18)
LDLC SERPL CALC-MCNC: 115 MG/DL (ref 0–99)
LDLC/HDLC SERPL: 3.6 {RATIO}
MCH RBC QN AUTO: 27.7 PG (ref 27–31)
MCHC RBC AUTO-ENTMCNC: 33.3 G/DL (ref 30–37)
MCV RBC AUTO: 83.2 FL (ref 80–94)
PLATELET # BLD AUTO: 182 10*3/MM3 (ref 130–400)
PMV BLD AUTO: 9.7 FL (ref 6–12)
POTASSIUM BLD-SCNC: 3.8 MMOL/L (ref 3.5–5.1)
RBC # BLD AUTO: 5.35 10*6/MM3 (ref 4.7–6.1)
SODIUM BLD-SCNC: 139 MMOL/L (ref 137–145)
TRIGL SERPL-MCNC: 159 MG/DL
VLDLC SERPL-MCNC: 31.8 MG/DL
WBC NRBC COR # BLD: 9.64 10*3/MM3 (ref 4.8–10.8)

## 2018-10-18 PROCEDURE — A9270 NON-COVERED ITEM OR SERVICE: HCPCS | Performed by: INTERNAL MEDICINE

## 2018-10-18 PROCEDURE — 25010000002 PROMETHAZINE PER 50 MG: Performed by: INTERNAL MEDICINE

## 2018-10-18 PROCEDURE — 63710000001 AMLODIPINE 5 MG TABLET: Performed by: INTERNAL MEDICINE

## 2018-10-18 PROCEDURE — 99217 PR OBSERVATION CARE DISCHARGE MANAGEMENT: CPT | Performed by: NURSE PRACTITIONER

## 2018-10-18 PROCEDURE — 63710000001 LISINOPRIL 20 MG TABLET: Performed by: INTERNAL MEDICINE

## 2018-10-18 PROCEDURE — 63710000001 DULOXETINE 20 MG CAPSULE DELAYED-RELEASE PARTICLES: Performed by: INTERNAL MEDICINE

## 2018-10-18 PROCEDURE — 80061 LIPID PANEL: CPT | Performed by: INTERNAL MEDICINE

## 2018-10-18 PROCEDURE — 63710000001 NICOTINE 21 MG/24HR PATCH 24 HOUR: Performed by: INTERNAL MEDICINE

## 2018-10-18 PROCEDURE — 63710000001 FAMOTIDINE 20 MG TABLET: Performed by: INTERNAL MEDICINE

## 2018-10-18 PROCEDURE — 63710000001 HYDROCODONE-ACETAMINOPHEN 7.5-325 MG TABLET: Performed by: INTERNAL MEDICINE

## 2018-10-18 PROCEDURE — 80048 BASIC METABOLIC PNL TOTAL CA: CPT | Performed by: INTERNAL MEDICINE

## 2018-10-18 PROCEDURE — 63710000001 HYDRALAZINE 25 MG TABLET: Performed by: INTERNAL MEDICINE

## 2018-10-18 PROCEDURE — 63710000001 CARVEDILOL 25 MG TABLET: Performed by: INTERNAL MEDICINE

## 2018-10-18 PROCEDURE — 63710000001 ASPIRIN 81 MG CHEWABLE TABLET: Performed by: INTERNAL MEDICINE

## 2018-10-18 PROCEDURE — 63710000001 POLYETHYLENE GLYCOL PACK: Performed by: INTERNAL MEDICINE

## 2018-10-18 PROCEDURE — 63710000001 TICAGRELOR 90 MG TABLET: Performed by: INTERNAL MEDICINE

## 2018-10-18 PROCEDURE — 93005 ELECTROCARDIOGRAM TRACING: CPT | Performed by: INTERNAL MEDICINE

## 2018-10-18 PROCEDURE — 63710000001 ISOSORBIDE DINITRATE 20 MG TABLET: Performed by: INTERNAL MEDICINE

## 2018-10-18 PROCEDURE — G0378 HOSPITAL OBSERVATION PER HR: HCPCS

## 2018-10-18 PROCEDURE — 63710000001 DOCUSATE SODIUM 100 MG CAPSULE: Performed by: INTERNAL MEDICINE

## 2018-10-18 PROCEDURE — 85027 COMPLETE CBC AUTOMATED: CPT | Performed by: INTERNAL MEDICINE

## 2018-10-18 PROCEDURE — 99213 OFFICE O/P EST LOW 20 MIN: CPT | Performed by: INTERNAL MEDICINE

## 2018-10-18 PROCEDURE — 63710000001 ALPRAZOLAM 0.5 MG TABLET: Performed by: INTERNAL MEDICINE

## 2018-10-18 PROCEDURE — 25010000002 ENOXAPARIN PER 10 MG: Performed by: INTERNAL MEDICINE

## 2018-10-18 PROCEDURE — 25010000002 ONDANSETRON PER 1 MG: Performed by: INTERNAL MEDICINE

## 2018-10-18 RX ORDER — SODIUM CHLORIDE 9 MG/ML
INJECTION, SOLUTION INTRAVENOUS
Status: COMPLETED
Start: 2018-10-18 | End: 2018-10-18

## 2018-10-18 RX ORDER — ISOSORBIDE DINITRATE 5 MG/1
10 TABLET ORAL
Qty: 180 TABLET | Refills: 0 | Status: SHIPPED | OUTPATIENT
Start: 2018-10-18 | End: 2018-12-10 | Stop reason: SDUPTHER

## 2018-10-18 RX ORDER — ASPIRIN 81 MG/1
81 TABLET, CHEWABLE ORAL DAILY
Qty: 30 TABLET | Refills: 0 | Status: SHIPPED | OUTPATIENT
Start: 2018-10-19

## 2018-10-18 RX ORDER — HYDRALAZINE HYDROCHLORIDE 25 MG/1
25 TABLET, FILM COATED ORAL EVERY 8 HOURS SCHEDULED
Status: DISCONTINUED | OUTPATIENT
Start: 2018-10-18 | End: 2018-10-18 | Stop reason: HOSPADM

## 2018-10-18 RX ORDER — ISOSORBIDE DINITRATE 20 MG/1
10 TABLET ORAL
Status: DISCONTINUED | OUTPATIENT
Start: 2018-10-18 | End: 2018-10-18 | Stop reason: HOSPADM

## 2018-10-18 RX ORDER — LISINOPRIL 40 MG/1
40 TABLET ORAL EVERY 12 HOURS SCHEDULED
Qty: 60 TABLET | Refills: 0 | Status: SHIPPED | OUTPATIENT
Start: 2018-10-18 | End: 2018-12-10 | Stop reason: SDUPTHER

## 2018-10-18 RX ORDER — ATORVASTATIN CALCIUM 80 MG/1
80 TABLET, FILM COATED ORAL NIGHTLY
Qty: 30 TABLET | Refills: 0 | Status: SHIPPED | OUTPATIENT
Start: 2018-10-18

## 2018-10-18 RX ORDER — HYDRALAZINE HYDROCHLORIDE 25 MG/1
25 TABLET, FILM COATED ORAL EVERY 8 HOURS SCHEDULED
Qty: 90 TABLET | Refills: 0 | Status: SHIPPED | OUTPATIENT
Start: 2018-10-18 | End: 2018-12-10 | Stop reason: SDUPTHER

## 2018-10-18 RX ORDER — CARVEDILOL 25 MG/1
50 TABLET ORAL 2 TIMES DAILY WITH MEALS
Qty: 120 TABLET | Refills: 0 | Status: SHIPPED | OUTPATIENT
Start: 2018-10-18 | End: 2018-12-10 | Stop reason: SDUPTHER

## 2018-10-18 RX ADMIN — FAMOTIDINE 40 MG: 20 TABLET, FILM COATED ORAL at 08:33

## 2018-10-18 RX ADMIN — DULOXETINE HYDROCHLORIDE 20 MG: 20 CAPSULE, DELAYED RELEASE ORAL at 08:33

## 2018-10-18 RX ADMIN — HYDRALAZINE HYDROCHLORIDE 25 MG: 25 TABLET ORAL at 13:23

## 2018-10-18 RX ADMIN — HYDROCODONE BITARTRATE AND ACETAMINOPHEN 1 TABLET: 7.5; 325 TABLET ORAL at 06:12

## 2018-10-18 RX ADMIN — ENOXAPARIN SODIUM 40 MG: 40 INJECTION SUBCUTANEOUS at 00:55

## 2018-10-18 RX ADMIN — NICOTINE 1 PATCH: 21 PATCH TRANSDERMAL at 00:55

## 2018-10-18 RX ADMIN — LISINOPRIL 40 MG: 20 TABLET ORAL at 08:33

## 2018-10-18 RX ADMIN — ALPRAZOLAM 0.5 MG: 0.5 TABLET ORAL at 03:50

## 2018-10-18 RX ADMIN — PROMETHAZINE HYDROCHLORIDE 12.5 MG: 25 INJECTION INTRAMUSCULAR; INTRAVENOUS at 03:50

## 2018-10-18 RX ADMIN — ONDANSETRON 4 MG: 2 INJECTION INTRAMUSCULAR; INTRAVENOUS at 03:05

## 2018-10-18 RX ADMIN — SODIUM CHLORIDE: 900 INJECTION INTRAVENOUS at 04:12

## 2018-10-18 RX ADMIN — ISOSORBIDE DINITRATE 10 MG: 20 TABLET ORAL at 13:23

## 2018-10-18 RX ADMIN — SODIUM CHLORIDE 100 ML/HR: 9 INJECTION, SOLUTION INTRAVENOUS at 03:05

## 2018-10-18 RX ADMIN — AMLODIPINE BESYLATE 10 MG: 5 TABLET ORAL at 08:33

## 2018-10-18 RX ADMIN — CARVEDILOL 50 MG: 25 TABLET, FILM COATED ORAL at 08:32

## 2018-10-18 RX ADMIN — TICAGRELOR 90 MG: 90 TABLET ORAL at 08:32

## 2018-10-18 RX ADMIN — DOCUSATE SODIUM 100 MG: 100 CAPSULE, LIQUID FILLED ORAL at 08:32

## 2018-10-18 RX ADMIN — POLYETHYLENE GLYCOL 3350 17 G: 17 POWDER, FOR SOLUTION ORAL at 08:32

## 2018-10-18 RX ADMIN — ASPIRIN 81 MG 81 MG: 81 TABLET ORAL at 08:32

## 2018-10-18 RX ADMIN — SODIUM CHLORIDE: 9 INJECTION, SOLUTION INTRAVENOUS at 04:12

## 2018-10-19 ENCOUNTER — TRANSITIONAL CARE MANAGEMENT TELEPHONE ENCOUNTER (OUTPATIENT)
Dept: INTERNAL MEDICINE | Facility: CLINIC | Age: 43
End: 2018-10-19

## 2018-10-19 ENCOUNTER — READMISSION MANAGEMENT (OUTPATIENT)
Dept: CALL CENTER | Facility: HOSPITAL | Age: 43
End: 2018-10-19

## 2018-10-19 NOTE — OUTREACH NOTE
Prep Survey      Responses   Facility patient discharged from?     Is patient eligible?  Yes   Discharge diagnosis  Accidental drug overdose COPD (chronic obstructive pulmonary disease) (CMS/HCC   Does the patient have one of the following disease processes/diagnoses(primary or secondary)?  COPD/Pneumonia   Does the patient have Home health ordered?  No   Is there a DME ordered?  No   Comments regarding appointments  going to behavioral health for counseling   Prep survey completed?  Yes          Vika Arita RN

## 2018-10-22 ENCOUNTER — READMISSION MANAGEMENT (OUTPATIENT)
Dept: CALL CENTER | Facility: HOSPITAL | Age: 43
End: 2018-10-22

## 2018-10-22 NOTE — OUTREACH NOTE
COPD/PN Week 1 Survey      Responses   Facility patient discharged from?  [facility]   Does the patient have one of the following disease processes/diagnoses(primary or secondary)?  COPD/Pneumonia   Is there a successful TCM telephone encounter documented?  Yes          Ericka Moore RN

## 2018-10-28 ENCOUNTER — READMISSION MANAGEMENT (OUTPATIENT)
Dept: CALL CENTER | Facility: HOSPITAL | Age: 43
End: 2018-10-28

## 2018-10-28 NOTE — OUTREACH NOTE
COPD/PN Week 2 Survey      Responses   Facility patient discharged from?  [facility]   Does the patient have one of the following disease processes/diagnoses(primary or secondary)?  COPD/Pneumonia   Was the primary reason for admission:  COPD exacerbation   Week 2 attempt successful?  No   Unsuccessful attempts  Attempt 1          Elvi Jeong, RN

## 2018-10-30 ENCOUNTER — READMISSION MANAGEMENT (OUTPATIENT)
Dept: CALL CENTER | Facility: HOSPITAL | Age: 43
End: 2018-10-30

## 2018-10-30 NOTE — OUTREACH NOTE
COPD/PN Week 2 Survey      Responses   Facility patient discharged from?  [facility]   Does the patient have one of the following disease processes/diagnoses(primary or secondary)?  COPD/Pneumonia   Was the primary reason for admission:  COPD exacerbation   Week 2 attempt successful?  Yes   Call start time  1009   Call end time  1016   Is patient permission given to speak with other caregiver?  No   Meds reviewed with patient/caregiver?  Yes   Is the patient taking all medications as directed (includes completed medication regime)?  Yes   Medication comments  hospital pharmacy brought medication at the time dc   Comments regarding appointments  will be keeping his appointment Dr. Casey and provider tomorrow   Has the patient kept scheduled appointments due by today?  Yes   Has home health visited the patient within 72 hours of discharge?  N/A   What is the patient's perception of their health status since discharge?  Same   Are the patient's immunizations up to date?   No [recommended to have a flu shot, afraid may have problems]   Is the patient/caregiver able to teach back the hierarchy of who to call/visit for symptoms/problems? PCP, Specialist, Home health nurse, Urgent Care, ED, 911  Yes   Patient reports what zone on this call?  Yellow Zone   Yellow Zone  Unable to complete daily activities, Medication is not helping relieve symptoms, Poor sleep and symptoms work patient up   Yellow interventions  Use oxygen as ordered, Do not smoke, Get plenty of rest   Is the patient/caregiver able to teach back signs and symptoms of worsening condition:  Fever/chills, Shortness of breath, Chest pain   Is the patient/caregiver able to teach back importance of completing antibiotic course of treatment?  Yes   Week 2 call completed?  Yes   Wrap up additional comments  has a pulse ox which he cks his oxygen, has his counselor number  but not been to mental health as yet          Antonella Elizabeth RN

## 2018-11-09 ENCOUNTER — READMISSION MANAGEMENT (OUTPATIENT)
Dept: CALL CENTER | Facility: HOSPITAL | Age: 43
End: 2018-11-09

## 2018-11-09 ENCOUNTER — OFFICE VISIT (OUTPATIENT)
Dept: PULMONOLOGY | Facility: CLINIC | Age: 43
End: 2018-11-09

## 2018-11-09 VITALS
HEART RATE: 83 BPM | RESPIRATION RATE: 18 BRPM | BODY MASS INDEX: 32.62 KG/M2 | HEIGHT: 66 IN | DIASTOLIC BLOOD PRESSURE: 100 MMHG | SYSTOLIC BLOOD PRESSURE: 140 MMHG | OXYGEN SATURATION: 95 % | WEIGHT: 203 LBS

## 2018-11-09 DIAGNOSIS — G47.33 OSA (OBSTRUCTIVE SLEEP APNEA): ICD-10-CM

## 2018-11-09 DIAGNOSIS — J44.9 CHRONIC OBSTRUCTIVE PULMONARY DISEASE, UNSPECIFIED COPD TYPE (HCC): ICD-10-CM

## 2018-11-09 DIAGNOSIS — R09.02 HYPOXIA: ICD-10-CM

## 2018-11-09 DIAGNOSIS — J96.11 CHRONIC RESPIRATORY FAILURE WITH HYPOXIA (HCC): Primary | ICD-10-CM

## 2018-11-09 PROCEDURE — 99214 OFFICE O/P EST MOD 30 MIN: CPT | Performed by: NURSE PRACTITIONER

## 2018-11-09 NOTE — PROGRESS NOTES
"Chief Complaint   Patient presents with   • Follow-up   • Shortness of Breath         Subjective   Tesfaye Ang is a 43 y.o. male.     History of Present Illness   The patient comes in today for follow-up of COPD, chronic respiratory failure, and obstructive sleep apnea with hypoxemia.    He was hospitalized in September and while he was there had a cardiac stent placed.  He has been on continual oxygen therapy since he was discharged.    He continues using Trelegy daily.  He uses the nebulizer a couple of times a day on average.    He denies any increased cough or increased shortness of breath.    He denies any changes in his exercise tolerance.    The following portions of the patient's history were reviewed and updated as appropriate: allergies, current medications, past family history, past medical history, past social history and past surgical history.    Review of Systems   Constitutional: Negative for chills and fever.   HENT: Negative for sinus pressure, sneezing and sore throat.    Respiratory: Positive for shortness of breath. Negative for cough, chest tightness and wheezing.    Psychiatric/Behavioral: Negative for sleep disturbance.       Objective   Visit Vitals  /100   Pulse 83   Resp 18   Ht 167.6 cm (65.98\")   Wt 92.1 kg (203 lb)   SpO2 95% Comment: RESTING ON ROOM AIR   BMI 32.78 kg/m²     Physical Exam   Constitutional: He is oriented to person, place, and time. He appears well-developed and well-nourished.   HENT:   Head: Normocephalic and atraumatic.   Eyes: EOM are normal.   Neck: Neck supple.   Cardiovascular: Normal rate and regular rhythm.    Pulmonary/Chest: Effort normal. No respiratory distress.   Somewhat decreased A/E with wheezing noted.   Musculoskeletal: He exhibits no edema.   Gait with cane.   Neurological: He is alert and oriented to person, place, and time.   Skin: Skin is warm and dry.   Psychiatric: He has a normal mood and affect.   Vitals " reviewed.          Assessment/Plan   Tesfaye was seen today for follow-up and shortness of breath.    Diagnoses and all orders for this visit:    Chronic respiratory failure with hypoxia (CMS/HCC)    Chronic obstructive pulmonary disease, unspecified COPD type (CMS/HCC)    ALTAGRACIA (obstructive sleep apnea)    Hypoxia    Other orders  -     Fluticasone-Umeclidin-Vilant (TRELEGY ELLIPTA) 100-62.5-25 MCG/INH aerosol powder ; Inhale 1 each Daily. Rinse mouth with water after use.           Return in about 4 months (around 3/9/2019) for Recheck, For Dr. Oconnor.    DISCUSSION (if any):  I reviewed his hospital discharge summary which mentions an accidental overdose of his pain medication with a decreased loss of consciousness as well as an elevated troponin which was further investigated and he had a cardiac stent while hospitalized.    He was discharged from the hospital on oxygen continuously however his resting oxygen saturation is 95% so he most likely needs it at night and with activity rather than continuous now.    I discussed the obstructive sleep apnea and chronic respiratory failure and he does not want a BiPAP right now.  He unfortunately does not qualify for noninvasive ventilation at this time.    He had a CT completed in September which did not show a change in the nodules.  At this time he will only need a repeat CT if there are changes in his symptoms.    He should continue his medications as discussed above as well as general nebs on an as-needed basis.    I have advised him to quit smoking altogether.      Dictated utilizing Dragon dictation.    This document was electronically signed by SHALOM Russo November 9, 2018  11:44 AM

## 2018-11-09 NOTE — OUTREACH NOTE
COPD/PN Week 3 Survey      Responses   Facility patient discharged from?  [facility]   Does the patient have one of the following disease processes/diagnoses(primary or secondary)?  COPD/Pneumonia   Was the primary reason for admission:  COPD exacerbation   Week 3 attempt successful?  Yes   Call start time  1235   Rescheduled  Rescheduled-pt requested   Call end time  1236          Osmar Nayak RN

## 2018-11-13 ENCOUNTER — READMISSION MANAGEMENT (OUTPATIENT)
Dept: CALL CENTER | Facility: HOSPITAL | Age: 43
End: 2018-11-13

## 2018-11-13 NOTE — OUTREACH NOTE
COPD/PN Week 3 Survey      Responses   Facility patient discharged from?  [facility]   Does the patient have one of the following disease processes/diagnoses(primary or secondary)?  COPD/Pneumonia   Week 3 attempt successful?  No   Unsuccessful attempts  Attempt 1   Rescheduled  Revoked   Revoke  Decline to participate          Kayy Vasquez, RN

## 2018-11-14 ENCOUNTER — OFFICE VISIT (OUTPATIENT)
Dept: INTERNAL MEDICINE | Facility: CLINIC | Age: 43
End: 2018-11-14

## 2018-11-14 VITALS
HEART RATE: 109 BPM | WEIGHT: 207.38 LBS | HEIGHT: 66 IN | OXYGEN SATURATION: 98 % | TEMPERATURE: 98.6 F | SYSTOLIC BLOOD PRESSURE: 130 MMHG | BODY MASS INDEX: 33.33 KG/M2 | DIASTOLIC BLOOD PRESSURE: 100 MMHG

## 2018-11-14 DIAGNOSIS — I24.0 OCCLUSION OF LAD (LEFT ANTERIOR DESCENDING) ARTERY (HCC): ICD-10-CM

## 2018-11-14 DIAGNOSIS — T40.601D: Primary | ICD-10-CM

## 2018-11-14 DIAGNOSIS — K40.91 UNILATERAL RECURRENT INGUINAL HERNIA WITHOUT OBSTRUCTION OR GANGRENE: ICD-10-CM

## 2018-11-14 DIAGNOSIS — B35.3 TINEA PEDIS OF BOTH FEET: ICD-10-CM

## 2018-11-14 DIAGNOSIS — I25.10 CORONARY ARTERY DISEASE INVOLVING NATIVE CORONARY ARTERY OF NATIVE HEART WITHOUT ANGINA PECTORIS: ICD-10-CM

## 2018-11-14 DIAGNOSIS — T50.901D ACCIDENTAL DRUG OVERDOSE, SUBSEQUENT ENCOUNTER: ICD-10-CM

## 2018-11-14 DIAGNOSIS — Z95.5 STATUS POST INSERTION OF DRUG-ELUTING STENT INTO LEFT ANTERIOR DESCENDING (LAD) ARTERY FOR CORONARY ARTERY DISEASE: ICD-10-CM

## 2018-11-14 PROCEDURE — 99214 OFFICE O/P EST MOD 30 MIN: CPT | Performed by: FAMILY MEDICINE

## 2018-11-14 RX ORDER — CALCIUM ACETATE MONOHYDRATE AND ALUMINUM SULFATE TETRADECAHYDRATE 952; 1347 MG/2299MG; MG/2299MG
1 POWDER, FOR SOLUTION TOPICAL 3 TIMES DAILY
Qty: 100 EACH | Refills: 2 | Status: SHIPPED | OUTPATIENT
Start: 2018-11-14

## 2018-11-14 RX ORDER — FLUCONAZOLE 150 MG/1
150 TABLET ORAL WEEKLY
Qty: 6 TABLET | Refills: 0 | Status: SHIPPED | OUTPATIENT
Start: 2018-11-14 | End: 2018-12-10

## 2018-11-14 NOTE — PROGRESS NOTES
"Subjective    Tesfaye Ang is a 43 y.o. male here for:  Chief Complaint   Patient presents with   • Follow-up     Hospital Follow up from [facility]      History of Present Illness     Recently admitted after unintentional overdose on opiate, found during admission to have blockage in LAD and underwent successful cardiac catheterization with placement of BRITNEY. Feels better somewhat since placement of stent, his shortness of breath seems lessened somewhat. Patient says he'll deal with pain in some other way, does not plan to return to pain management. Patient scheduled to see a therapist next month.    Previously diagnosed with a hernia in the groin, symptoms have recurred on left. Painful, fullness noted. No signs of obstruction nor gangrene. Patient interested in surgical correction.     Also complains of breakdown of tissue to feet. History of athletes foot that has not responded to over the counter antifungals. He has breakdown of skin between toes and some blistering on soles developing.     The following portions of the patient's history were reviewed and updated as appropriate: allergies, current medications, past family history, past medical history, past social history, past surgical history and problem list.    Review of Systems   Constitutional: Positive for activity change and fatigue.   Respiratory: Positive for shortness of breath.    Musculoskeletal: Positive for arthralgias, back pain and gait problem.   Skin: Positive for color change and skin lesions.   Neurological: Positive for weakness.       Vitals:    11/14/18 0851   BP: 130/100   Pulse: 109   Temp: 98.6 °F (37 °C)   SpO2: 98%   Weight: 94.1 kg (207 lb 6 oz)   Height: 167.6 cm (65.98\")         Objective   Physical Exam   Constitutional: He is oriented to person, place, and time. Vital signs are normal. He appears well-developed and well-nourished. He is active.  Non-toxic appearance. He has a sickly appearance. He does not appear ill. No " distress. He is obese.  HENT:   Head: Normocephalic and atraumatic.   Eyes: EOM are normal.   Neck: Neck supple.   Cardiovascular: Normal rate and regular rhythm.   Pulmonary/Chest: Effort normal and breath sounds normal.   Abdominal: A hernia is present. Hernia confirmed positive in the left inguinal area.   Genitourinary: Penis normal. Left testis shows no mass and no tenderness. Circumcised.   Genitourinary Comments: Emy Dyson CMA, Chaperone     Neurological: He is alert and oriented to person, place, and time. No cranial nerve deficit. Gait abnormal.   Skin: Skin is warm and dry. Turgor is normal. Rash (peeling of skin to feet bilaterally, dry. there is maceration noted between all toes bilaterally. ) noted. He is not diaphoretic. No cyanosis.   Psychiatric: He has a normal mood and affect. His behavior is normal.   Nursing note and vitals reviewed.        Assessment/Plan     Problem List Items Addressed This Visit        Cardiovascular and Mediastinum    Coronary artery disease involving native coronary artery of native heart without angina pectoris    Occlusion of LAD (left anterior descending) artery (CMS/HCC)       Other    Accidental drug overdose    Status post insertion of drug-eluting stent into left anterior descending (LAD) artery for coronary artery disease    Opiate or related narcotic overdose (CMS/HCC) - Primary      Other Visit Diagnoses     Unilateral recurrent inguinal hernia without obstruction or gangrene        Relevant Orders    Ambulatory Referral to General Surgery    Tinea pedis of both feet        Relevant Medications    aluminum sulfate-calcium acetate (DOMEBORO) packet    fluconazole (DIFLUCAN) 150 MG tablet          · Hospital records reviewed. Follow up with cardiology as scheduled, continue current medicines, especially Brilinta and aspirin.   · Due to COPD, respiratory issues, continued use of narcotic is not ideal    Return for As Needed.    Karlee Thomas MD

## 2018-11-18 PROBLEM — R77.8 ELEVATED TROPONIN I LEVEL: Status: RESOLVED | Noted: 2018-10-16 | Resolved: 2018-11-18

## 2018-11-18 PROBLEM — R79.89 ELEVATED TROPONIN I LEVEL: Status: RESOLVED | Noted: 2018-10-16 | Resolved: 2018-11-18

## 2018-11-18 PROBLEM — I24.0 OCCLUSION OF LAD (LEFT ANTERIOR DESCENDING) ARTERY (HCC): Status: ACTIVE | Noted: 2018-11-18

## 2018-11-18 PROBLEM — K52.9 GASTROENTERITIS: Status: RESOLVED | Noted: 2018-10-16 | Resolved: 2018-11-18

## 2018-11-18 PROBLEM — T40.601A OPIATE OR RELATED NARCOTIC OVERDOSE (HCC): Status: ACTIVE | Noted: 2018-11-18

## 2018-11-18 PROBLEM — Z95.5 STATUS POST INSERTION OF DRUG-ELUTING STENT INTO LEFT ANTERIOR DESCENDING (LAD) ARTERY FOR CORONARY ARTERY DISEASE: Status: ACTIVE | Noted: 2018-11-18

## 2018-11-18 PROBLEM — R74.8 ABNORMAL CARDIAC ENZYME LEVEL: Status: RESOLVED | Noted: 2018-10-15 | Resolved: 2018-11-18

## 2018-11-18 PROBLEM — R55 SYNCOPE: Status: RESOLVED | Noted: 2018-10-15 | Resolved: 2018-11-18

## 2018-11-21 ENCOUNTER — TELEPHONE (OUTPATIENT)
Dept: SURGERY | Facility: CLINIC | Age: 43
End: 2018-11-21

## 2018-11-21 NOTE — TELEPHONE ENCOUNTER
Patient no showed for appointment with Dr Victoria.Called patient no answer,left message to call the office. Called and spoke with patient wife she rescheduled  Appointment.

## 2018-11-27 ENCOUNTER — OFFICE VISIT (OUTPATIENT)
Dept: CARDIOLOGY | Facility: CLINIC | Age: 43
End: 2018-11-27

## 2018-11-27 VITALS
SYSTOLIC BLOOD PRESSURE: 158 MMHG | WEIGHT: 209 LBS | OXYGEN SATURATION: 98 % | HEIGHT: 66 IN | DIASTOLIC BLOOD PRESSURE: 96 MMHG | BODY MASS INDEX: 33.59 KG/M2 | HEART RATE: 72 BPM

## 2018-11-27 DIAGNOSIS — J41.0 SIMPLE CHRONIC BRONCHITIS (HCC): ICD-10-CM

## 2018-11-27 DIAGNOSIS — G89.4 CHRONIC PAIN SYNDROME: ICD-10-CM

## 2018-11-27 DIAGNOSIS — I25.10 CORONARY ARTERY DISEASE INVOLVING NATIVE CORONARY ARTERY OF NATIVE HEART WITHOUT ANGINA PECTORIS: Primary | ICD-10-CM

## 2018-11-27 DIAGNOSIS — I10 ESSENTIAL HYPERTENSION: Chronic | ICD-10-CM

## 2018-11-27 PROCEDURE — 99214 OFFICE O/P EST MOD 30 MIN: CPT | Performed by: INTERNAL MEDICINE

## 2018-11-27 RX ORDER — CHLORTHALIDONE 25 MG/1
12.5 TABLET ORAL DAILY
Qty: 15 TABLET | Refills: 11 | Status: SHIPPED | OUTPATIENT
Start: 2018-11-27 | End: 2018-12-10 | Stop reason: SDUPTHER

## 2018-11-27 NOTE — PROGRESS NOTES
Subjective:     Encounter Date:11/27/2018      Patient ID: Tesfaye Ang is a 43 y.o. male.    Chief Complaint: Coronary artery disease  HPI  This is a 43-year-old male patient who presents to cardiology clinic for routine follow-up.  The patient indicates that he has been in a severe amount of chronic pain.  He thinks this is responsible for his blood pressure being elevated.  He reports compliance with his medications.  He shortness of breath both at rest and with activity remains at baseline.  He is on chronic oxygen therapy.  He has had no recurrent chest discomfort.  There is no orthopnea PND or lower extremity edema.  His peripheral edema nearly completely resolved after stopping amlodipine.  He has no dizziness palpitations or syncope.  The following portions of the patient's history were reviewed and updated as appropriate: allergies, current medications, past family history, past medical history, past social history, past surgical history and problem  Review of Systems   Constitution: Negative for chills, diaphoresis, fever, weakness, malaise/fatigue, weight gain and weight loss.   HENT: Negative for ear discharge, hearing loss, hoarse voice and nosebleeds.    Eyes: Negative for discharge, double vision, pain and photophobia.   Cardiovascular: Negative for chest pain, claudication, cyanosis, dyspnea on exertion, irregular heartbeat, leg swelling, near-syncope, orthopnea, palpitations, paroxysmal nocturnal dyspnea and syncope.   Respiratory: Negative for cough, hemoptysis, shortness of breath, sputum production and wheezing.    Endocrine: Negative for cold intolerance, heat intolerance, polydipsia, polyphagia and polyuria.   Hematologic/Lymphatic: Negative for adenopathy and bleeding problem. Does not bruise/bleed easily.   Skin: Negative for color change, flushing, itching and rash.   Musculoskeletal: Negative for muscle cramps, muscle weakness, myalgias and stiffness.   Gastrointestinal: Negative  for abdominal pain, diarrhea, hematemesis, hematochezia, nausea and vomiting.   Genitourinary: Negative for dysuria, frequency and nocturia.   Neurological: Negative for focal weakness, loss of balance, numbness, paresthesias and seizures.   Psychiatric/Behavioral: Negative for altered mental status, hallucinations and suicidal ideas.   Allergic/Immunologic: Negative for HIV exposure, hives and persistent infections.           Current Outpatient Medications:   •  aluminum sulfate-calcium acetate (DOMEBORO) packet, Apply 1 packet topically to the appropriate area as directed 3 (Three) Times a Day., Disp: 100 each, Rfl: 2  •  aspirin 81 MG chewable tablet, Chew 1 tablet by mouth Daily., Disp: 30 tablet, Rfl: 0  •  atorvastatin (LIPITOR) 80 MG tablet, Take 1 tablet by mouth Every Night., Disp: 30 tablet, Rfl: 0  •  carvedilol (COREG) 25 MG tablet, Take 2 tablets by mouth 2 (Two) Times a Day With Meals., Disp: 120 tablet, Rfl: 0  •  DULoxetine (CYMBALTA) 20 MG capsule, Take 1 capsule by mouth Daily., Disp: 30 capsule, Rfl: 5  •  Fluticasone-Umeclidin-Vilant (TRELEGY ELLIPTA) 100-62.5-25 MCG/INH aerosol powder , Inhale 1 each Daily. Rinse mouth with water after use., Disp: 1 each, Rfl: 5  •  hydrALAZINE (APRESOLINE) 25 MG tablet, Take 1 tablet by mouth Every 8 (Eight) Hours., Disp: 90 tablet, Rfl: 0  •  ipratropium-albuterol (DUO-NEB) 0.5-2.5 mg/mL nebulizer, Take 3 mL by nebulization Every 4 (Four) Hours As Needed for Wheezing., Disp: 360 mL, Rfl: 3  •  isosorbide dinitrate (ISORDIL) 5 MG tablet, Take 2 tablets by mouth 3 (Three) Times a Day., Disp: 180 tablet, Rfl: 0  •  lisinopril (PRINIVIL,ZESTRIL) 40 MG tablet, Take 1 tablet by mouth Every 12 (Twelve) Hours., Disp: 60 tablet, Rfl: 0  •  Misc. Devices (PULSE OXIMETER DELUXE) misc, , Disp: , Rfl:   •  chlorthalidone (HYGROTON) 25 MG tablet, Take 0.5 tablets by mouth Daily., Disp: 15 tablet, Rfl: 11  •  fluconazole (DIFLUCAN) 150 MG tablet, Take 1 tablet by mouth 1  "(One) Time Per Week for 6 doses., Disp: 6 tablet, Rfl: 0  •  ticagrelor (BRILINTA) 90 MG tablet tablet, Take 1 tablet by mouth 2 (Two) Times a Day., Disp: 16 tablet, Rfl: 0     Objective:     Physical Exam   Constitutional: He is oriented to person, place, and time. He appears well-developed and well-nourished. No distress.   HENT:   Head: Normocephalic and atraumatic.   Mouth/Throat: Oropharynx is clear and moist.   Eyes: Conjunctivae and EOM are normal. Pupils are equal, round, and reactive to light. No scleral icterus.   Neck: Normal range of motion. Neck supple. No JVD present. No tracheal deviation present. No thyromegaly present.   Cardiovascular: Normal rate, regular rhythm, S1 normal, S2 normal, normal heart sounds, intact distal pulses and normal pulses. PMI is not displaced. Exam reveals no gallop and no friction rub.   No murmur heard.  Pulmonary/Chest: Effort normal and breath sounds normal. No respiratory distress. He has no wheezes. He has no rales.   Abdominal: Soft. Bowel sounds are normal. He exhibits no distension and no mass. There is no tenderness. There is no rebound and no guarding.   Musculoskeletal: Normal range of motion. He exhibits no edema or deformity.   Neurological: He is alert and oriented to person, place, and time. He displays normal reflexes. No cranial nerve deficit. Coordination normal.   Skin: Skin is warm and dry. No rash noted. He is not diaphoretic. No erythema.   Psychiatric: He has a normal mood and affect. His behavior is normal. Thought content normal.     Blood pressure 158/96, pulse 72, height 167.6 cm (65.98\"), weight 94.8 kg (209 lb), SpO2 98 %.   Lab Review:       Assessment:         1. Coronary artery disease involving native coronary artery of native heart without angina pectoris  Stable and angina free.    2. Essential hypertension  Poor blood pressure control.    3. Simple chronic bronchitis (CMS/HCC)  Stable symptoms on chronic oxygen therapy.    4. Chronic pain " syndrome  This appears to be influencing his blood pressure control.    Procedures     Plan:       I have recommended adding chlorthalidone 12.5 mg orally once per day.  The patient has been counseled regarding the importance of dietary sodium restriction.  No additional cardiovascular testing is indicated.

## 2018-12-03 ENCOUNTER — OFFICE VISIT (OUTPATIENT)
Dept: SURGERY | Facility: CLINIC | Age: 43
End: 2018-12-03

## 2018-12-03 VITALS
TEMPERATURE: 96.7 F | DIASTOLIC BLOOD PRESSURE: 100 MMHG | HEART RATE: 91 BPM | BODY MASS INDEX: 32.95 KG/M2 | SYSTOLIC BLOOD PRESSURE: 152 MMHG | OXYGEN SATURATION: 98 % | HEIGHT: 66 IN | WEIGHT: 205 LBS

## 2018-12-03 DIAGNOSIS — R10.32 LEFT INGUINAL PAIN: Primary | ICD-10-CM

## 2018-12-03 PROCEDURE — 99203 OFFICE O/P NEW LOW 30 MIN: CPT | Performed by: SURGERY

## 2018-12-03 NOTE — PROGRESS NOTES
Patient: Tesfaye Ang    YOB: 1975    Date: 12/03/2018    Primary Care Provider: Karlee Thomas MD    Chief Complaint   Patient presents with   • Hernia     left groin        Subjective .     History of present illness:  Patient is here for consultation an evaluation of hernia in the left groin area. Patient states he has had pain in area for 1 year now however pain has became worse in the last 6 weeks. Patient states pain becomes worse with lifting, movement and coughing. Patient denies any bulge at this time.      He continues to complain of sharp discomfort in the left groin, he has had a previous recent CAT scan performed in [facility] which was negative for hernia.  He has had a previous ultrasound performed that was questionable for hernia.        The following portions of the patient's history were reviewed and updated as appropriate: allergies, current medications, past family history, past medical history, past social history, past surgical history and problem list.       Review of Systems   Constitutional: Negative for chills, fever and unexpected weight change.   HENT: Negative for trouble swallowing and voice change.    Eyes: Negative for visual disturbance.   Respiratory: Negative for apnea, cough, chest tightness, shortness of breath and wheezing.    Cardiovascular: Negative for chest pain, palpitations and leg swelling.   Gastrointestinal: Negative for abdominal distention, abdominal pain, anal bleeding, blood in stool, constipation, diarrhea, nausea, rectal pain and vomiting.   Endocrine: Negative for cold intolerance and heat intolerance.   Genitourinary: Negative for difficulty urinating, dysuria, flank pain, scrotal swelling and testicular pain.   Musculoskeletal: Negative for back pain, gait problem and joint swelling.   Skin: Negative for color change, rash and wound.   Neurological: Negative for dizziness, syncope, speech difficulty, weakness, numbness and  headaches.   Hematological: Negative for adenopathy. Does not bruise/bleed easily.   Psychiatric/Behavioral: Negative for confusion. The patient is not nervous/anxious.        Allergies:  Allergies   Allergen Reactions   • Compazine [Prochlorperazine Edisylate] Anaphylaxis and Shortness Of Breath   • Gabapentin Other (See Comments)     seizures   • Toradol [Ketorolac Tromethamine] Hives and Shortness Of Breath   • Darvon [Propoxyphene] Other (See Comments) and Hives   • Vioxx [Rofecoxib] Other (See Comments)     Stomach bleeding  Stomach bleeding       Medications:    Current Outpatient Medications:   •  aluminum sulfate-calcium acetate (DOMEBORO) packet, Apply 1 packet topically to the appropriate area as directed 3 (Three) Times a Day., Disp: 100 each, Rfl: 2  •  aspirin 81 MG chewable tablet, Chew 1 tablet by mouth Daily., Disp: 30 tablet, Rfl: 0  •  atorvastatin (LIPITOR) 80 MG tablet, Take 1 tablet by mouth Every Night., Disp: 30 tablet, Rfl: 0  •  carvedilol (COREG) 25 MG tablet, Take 2 tablets by mouth 2 (Two) Times a Day With Meals., Disp: 120 tablet, Rfl: 0  •  chlorthalidone (HYGROTON) 25 MG tablet, Take 0.5 tablets by mouth Daily., Disp: 15 tablet, Rfl: 11  •  fluconazole (DIFLUCAN) 150 MG tablet, Take 1 tablet by mouth 1 (One) Time Per Week for 6 doses., Disp: 6 tablet, Rfl: 0  •  Fluticasone-Umeclidin-Vilant (TRELEGY ELLIPTA) 100-62.5-25 MCG/INH aerosol powder , Inhale 1 each Daily. Rinse mouth with water after use., Disp: 1 each, Rfl: 5  •  hydrALAZINE (APRESOLINE) 25 MG tablet, Take 1 tablet by mouth Every 8 (Eight) Hours., Disp: 90 tablet, Rfl: 0  •  ipratropium-albuterol (DUO-NEB) 0.5-2.5 mg/mL nebulizer, Take 3 mL by nebulization Every 4 (Four) Hours As Needed for Wheezing., Disp: 360 mL, Rfl: 3  •  isosorbide dinitrate (ISORDIL) 5 MG tablet, Take 2 tablets by mouth 3 (Three) Times a Day., Disp: 180 tablet, Rfl: 0  •  lisinopril (PRINIVIL,ZESTRIL) 40 MG tablet, Take 1 tablet by mouth Every 12  "(Twelve) Hours., Disp: 60 tablet, Rfl: 0  •  Misc. Devices (PULSE OXIMETER DELUXE) misc, , Disp: , Rfl:   •  ticagrelor (BRILINTA) 90 MG tablet tablet, Take 1 tablet by mouth 2 (Two) Times a Day., Disp: 16 tablet, Rfl: 0    History\"  Past Medical History:   Diagnosis Date   • Arthritis    • CHF (congestive heart failure) (CMS/HCC)    • Colon polyp    • COPD (chronic obstructive pulmonary disease) (CMS/HCC)    • Coronary artery disease    • Fractures    • Heart attack (CMS/HCC)    • Heart disease    • Heart failure (CMS/HCC)    • History of bilateral inguinal hernias    • Hx of transfusion of whole blood    • Hypertension    • Kidney stone    • Myocardial infarction (CMS/HCC)    • Obesity    • Seizures (CMS/HCC)     due to medication Gabapentin   • Stroke (CMS/HCC)     several TIA       Past Surgical History:   Procedure Laterality Date   • APPENDECTOMY  2012   • CARDIAC CATHETERIZATION     • CHOLECYSTECTOMY  2012   • COLON RESECTION     • HERNIA REPAIR  2016   • REVISION / TAKEDOWN COLOSTOMY         Family History   Problem Relation Age of Onset   • Heart attack Mother 56   • Hypertension Mother    • Migraines Mother    • Stroke Mother    • Heart attack Father 39   • Hypertension Father    • Arthritis Paternal Grandmother    • Arthritis Paternal Grandfather        Social History     Tobacco Use   • Smoking status: Former Smoker     Packs/day: 0.25     Types: Cigarettes     Last attempt to quit: 2016     Years since quittin.3   • Smokeless tobacco: Never Used   Substance Use Topics   • Alcohol use: No     Comment: STOPPED    • Drug use: No        Objective     Vital Signs:   Vitals:    18 0853   BP: 152/100   Pulse: 91   Temp: 96.7 °F (35.9 °C)   SpO2: 98%   Weight: 93 kg (205 lb)   Height: 167.6 cm (65.98\")       Physical Exam:   General Appearance:    Alert, cooperative, in no acute distress   Head:    Normocephalic, without obvious abnormality, atraumatic   Eyes:            Lids and " lashes normal, conjunctivae and sclerae normal, no   icterus, no pallor, corneas clear, PERRLA   Ears:    Ears appear intact with no abnormalities noted   Throat:   No oral lesions, no thrush, oral mucosa moist   Neck:   No adenopathy, supple, trachea midline, no thyromegaly, no   carotid bruit, no JVD   Lungs:     Clear to auscultation,respirations regular, even and                  unlabored    Heart:    Regular rhythm and normal rate, normal S1 and S2, no            murmur, no gallop, no rub, no click   Chest Wall:    No abnormalities observed   Abdomen:     Normal bowel sounds, no masses, no organomegaly, soft        non-tender, non-distended, no guarding, no rebound                Tenderness, questionable hernia in the left inguinal canal    Extremities:   Moves all extremities well, no edema, no cyanosis, no             redness   Pulses:   Pulses palpable and equal bilaterally   Skin:   No bleeding, bruising or rash   Lymph nodes:   No palpable adenopathy   Neurologic:   Cranial nerves 2 - 12 grossly intact, sensation intact, DTR       present and equal bilaterally     Results Review:   I reviewed the patient's new clinical results.  I reviewed the patient's new imaging results and agree with the interpretation.  I reviewed the patient's other test results and agree with the interpretation    Assessment/Plan     1. Left inguinal pain        In short, I did have a detailed and extensive discussion with the patient in the office today.  I think the patient needs to undergo repeat CT scan of the left inguinal region while undergoing Valsalva maneuver to better delineate the possibility of left inguinal hernia.  I will see the patient back in the office in follow-up.    I discussed the patients findings and my recommendations with patient.    Review of Systems was reviewed and confirmed as accurate today.    Electronically signed by Alex Victoria MD  12/04/18      .

## 2018-12-06 ENCOUNTER — APPOINTMENT (OUTPATIENT)
Dept: CT IMAGING | Facility: HOSPITAL | Age: 43
End: 2018-12-06
Attending: SURGERY

## 2018-12-10 ENCOUNTER — OFFICE VISIT (OUTPATIENT)
Dept: INTERNAL MEDICINE | Facility: CLINIC | Age: 43
End: 2018-12-10

## 2018-12-10 VITALS
DIASTOLIC BLOOD PRESSURE: 100 MMHG | HEIGHT: 66 IN | HEART RATE: 83 BPM | SYSTOLIC BLOOD PRESSURE: 160 MMHG | BODY MASS INDEX: 33.59 KG/M2 | RESPIRATION RATE: 18 BRPM | WEIGHT: 209 LBS | TEMPERATURE: 97.9 F | OXYGEN SATURATION: 97 %

## 2018-12-10 DIAGNOSIS — I25.10 CORONARY ARTERY DISEASE INVOLVING NATIVE CORONARY ARTERY OF NATIVE HEART WITHOUT ANGINA PECTORIS: ICD-10-CM

## 2018-12-10 DIAGNOSIS — J41.0 SIMPLE CHRONIC BRONCHITIS (HCC): ICD-10-CM

## 2018-12-10 DIAGNOSIS — J01.40 ACUTE NON-RECURRENT PANSINUSITIS: Primary | ICD-10-CM

## 2018-12-10 DIAGNOSIS — I10 ESSENTIAL HYPERTENSION: Chronic | ICD-10-CM

## 2018-12-10 DIAGNOSIS — J96.11 CHRONIC RESPIRATORY FAILURE WITH HYPOXIA (HCC): ICD-10-CM

## 2018-12-10 PROCEDURE — 99214 OFFICE O/P EST MOD 30 MIN: CPT | Performed by: FAMILY MEDICINE

## 2018-12-10 RX ORDER — HYDRALAZINE HYDROCHLORIDE 25 MG/1
25 TABLET, FILM COATED ORAL EVERY 8 HOURS SCHEDULED
Qty: 270 TABLET | Refills: 4 | Status: SHIPPED | OUTPATIENT
Start: 2018-12-10

## 2018-12-10 RX ORDER — DOXYCYCLINE HYCLATE 100 MG/1
100 CAPSULE ORAL 2 TIMES DAILY
Qty: 14 CAPSULE | Refills: 0 | Status: SHIPPED | OUTPATIENT
Start: 2018-12-10 | End: 2018-12-17

## 2018-12-10 RX ORDER — CHLORTHALIDONE 25 MG/1
25 TABLET ORAL DAILY
Qty: 90 TABLET | Refills: 4 | Status: SHIPPED | OUTPATIENT
Start: 2018-12-10

## 2018-12-10 RX ORDER — CARVEDILOL 25 MG/1
50 TABLET ORAL 2 TIMES DAILY WITH MEALS
Qty: 360 TABLET | Refills: 4 | Status: SHIPPED | OUTPATIENT
Start: 2018-12-10

## 2018-12-10 RX ORDER — LISINOPRIL 40 MG/1
40 TABLET ORAL EVERY 12 HOURS SCHEDULED
Qty: 180 TABLET | Refills: 4 | Status: SHIPPED | OUTPATIENT
Start: 2018-12-10

## 2018-12-10 RX ORDER — DEXTROMETHORPHAN HYDROBROMIDE AND PROMETHAZINE HYDROCHLORIDE 15; 6.25 MG/5ML; MG/5ML
5 SYRUP ORAL 4 TIMES DAILY PRN
Qty: 240 ML | Refills: 0 | Status: SHIPPED | OUTPATIENT
Start: 2018-12-10

## 2018-12-10 RX ORDER — ISOSORBIDE DINITRATE 5 MG/1
10 TABLET ORAL
Qty: 540 TABLET | Refills: 4 | Status: SHIPPED | OUTPATIENT
Start: 2018-12-10

## 2018-12-10 NOTE — PROGRESS NOTES
"Subjective    Tesfaye Ang is a 43 y.o. male here for:  Chief Complaint   Patient presents with   • Cough     x5 days has tried \"several OTC\" meds with no relief    • Nasal Congestion     x5 days    • Shortness of Breath     feels like \"someone is sitting on his chest\"       History of Present Illness   Patient presents with 5 days of a cough that has not been improved with over-the-counter medicine.  Cough is affecting sleep, worsens when he lays down.  Very problematic at this time as wife is now on night shift and needs to be able to sleep during the day.  He also has nasal congestion and some worsening of his chronic shortness of breath.  The other day at the like an elephant was sitting on his chest, that has improved somewhat.  Patient has known coronary artery disease status post stenting but this pain does not feel similar to cardiac pain from the past.  Severe COPD on chronic oxygen, he does not feel steroid is needed at this time.  Patient has used Phenergan in the past without problem.    Blood pressure is elevated today.  He was recently seen by cardiology who added chlorthalidone 12.5 mg.  Patient was previously on amlodipine but does not want to be on it due to potential carcinogens in medicine that he heard about recently.  He needs all of his medicines sent to mail order pharmacy.  Upon review medicines have not been refilled since his hospital admission for axonal overdose and stenting of coronary artery.    The following portions of the patient's history were reviewed and updated as appropriate: allergies, current medications, past family history, past medical history, past social history, past surgical history and problem list.    Health Maintenance   Topic Date Due   • HEPATITIS A VACCINE ADULT (1 of 2) 09/29/1993   • MEDICARE ANNUAL WELLNESS  03/02/2017   • TDAP/TD VACCINES (2 - Td) 01/01/2025   • INFLUENZA VACCINE  Addressed       Review of Systems   Constitutional: Positive for activity " "change and fatigue.   HENT: Positive for congestion, postnasal drip, rhinorrhea and sinus pressure.    Respiratory: Positive for cough and shortness of breath.        Vitals:    12/10/18 1008   BP: 160/100   Pulse: 83   Resp: 18   Temp: 97.9 °F (36.6 °C)   TempSrc: Temporal   SpO2: 97%   Weight: 94.8 kg (209 lb)   Height: 167.6 cm (65.98\")         Objective   Physical Exam   Constitutional: He is oriented to person, place, and time. Vital signs are normal. He appears well-developed and well-nourished. He is active.  Non-toxic appearance. He has a sickly appearance. He does not appear ill. No distress. He is obese.  HENT:   Head: Normocephalic and atraumatic.   Right Ear: Hearing normal.   Left Ear: Hearing normal.   Nose: Rhinorrhea present. Right sinus exhibits no maxillary sinus tenderness and no frontal sinus tenderness. Left sinus exhibits no maxillary sinus tenderness and no frontal sinus tenderness.   Mouth/Throat: Mucous membranes are not dry.   Eyes: EOM are normal. No scleral icterus.   Neck: Phonation normal. Neck supple.   Pulmonary/Chest: Effort normal and breath sounds normal. He has no decreased breath sounds. He has no wheezes. He has no rhonchi. He has no rales.   Hacking cough.  Wearing oxygen via nasal cannula   Neurological: He is alert and oriented to person, place, and time. He displays no tremor. No cranial nerve deficit.   Skin: Skin is warm. He is not diaphoretic. No cyanosis. No pallor.   Psychiatric: He has a normal mood and affect. His speech is normal and behavior is normal. Judgment and thought content normal. Cognition and memory are normal.   Nursing note and vitals reviewed.        Assessment/Plan     Problem List Items Addressed This Visit        Cardiovascular and Mediastinum    Essential hypertension (Chronic)    Relevant Medications    carvedilol (COREG) 25 MG tablet    lisinopril (PRINIVIL,ZESTRIL) 40 MG tablet    hydrALAZINE (APRESOLINE) 25 MG tablet    chlorthalidone (HYGROTON) " 25 MG tablet    isosorbide dinitrate (ISORDIL) 5 MG tablet    Coronary artery disease involving native coronary artery of native heart without angina pectoris    Relevant Medications    carvedilol (COREG) 25 MG tablet    isosorbide dinitrate (ISORDIL) 5 MG tablet       Respiratory    COPD (chronic obstructive pulmonary disease) (CMS/HCC)    Relevant Medications    promethazine-dextromethorphan (PROMETHAZINE-DM) 6.25-15 MG/5ML syrup    Respiratory failure with hypoxia (CMS/HCC)    Overview     · Oxygen dependent, continuous oxygen supplement via nasal cannula           Other Visit Diagnoses     Acute non-recurrent pansinusitis    -  Primary    Relevant Medications    doxycycline (VIBRAMYCIN) 100 MG capsule    promethazine-dextromethorphan (PROMETHAZINE-DM) 6.25-15 MG/5ML syrup          · Illness complicated by underlying heart disease and severe COPD with oxygen dependence.  At this time, patient has sinus infection and no evidence of pneumonia.  Antibiotics since, no steroid at this time.  Monitor for any worsening in chest discomfort, go to ER if this occurs.  Continue inhalers for COPD.  Continue oxygen.  Cough syrup provided to help him get some rest.  Blood pressure medicines refilled, I did go ahead and increase chlorthalidone to 25 mg daily given his elevated blood pressure.    Return in about 2 weeks (around 12/25/2018) for APC follow up htn .    Karlee Thomas MD

## 2018-12-12 ENCOUNTER — APPOINTMENT (OUTPATIENT)
Dept: CT IMAGING | Facility: HOSPITAL | Age: 43
End: 2018-12-12
Attending: SURGERY

## 2018-12-13 ENCOUNTER — HOSPITAL ENCOUNTER (OUTPATIENT)
Dept: CT IMAGING | Facility: HOSPITAL | Age: 43
Discharge: HOME | End: 2018-12-13
Attending: SURGERY | Admitting: SURGERY

## 2018-12-13 DIAGNOSIS — R10.32 LEFT INGUINAL PAIN: ICD-10-CM

## 2018-12-13 PROCEDURE — A9270 NON-COVERED ITEM OR SERVICE: HCPCS | Performed by: SURGERY

## 2018-12-13 PROCEDURE — 25010000002 IOPAMIDOL 61 % SOLUTION: Performed by: SURGERY

## 2018-12-13 PROCEDURE — 63710000001 BARIUM 2 % SUSPENSION: Performed by: SURGERY

## 2018-12-13 PROCEDURE — 74177 CT ABD & PELVIS W/CONTRAST: CPT

## 2018-12-13 RX ADMIN — BARIUM SULFATE 450 ML: 21 SUSPENSION ORAL at 10:00

## 2018-12-13 RX ADMIN — IOPAMIDOL 100 ML: 612 INJECTION, SOLUTION INTRAVENOUS at 10:00

## 2018-12-17 ENCOUNTER — OFFICE VISIT (OUTPATIENT)
Dept: SURGERY | Facility: CLINIC | Age: 43
End: 2018-12-17

## 2018-12-17 VITALS
OXYGEN SATURATION: 98 % | DIASTOLIC BLOOD PRESSURE: 90 MMHG | BODY MASS INDEX: 34.23 KG/M2 | SYSTOLIC BLOOD PRESSURE: 140 MMHG | WEIGHT: 213 LBS | HEIGHT: 66 IN | HEART RATE: 73 BPM | TEMPERATURE: 98.8 F

## 2018-12-17 DIAGNOSIS — G89.29 GROIN PAIN, CHRONIC, LEFT: Primary | ICD-10-CM

## 2018-12-17 DIAGNOSIS — R10.32 GROIN PAIN, CHRONIC, LEFT: Primary | ICD-10-CM

## 2018-12-17 PROCEDURE — 99213 OFFICE O/P EST LOW 20 MIN: CPT | Performed by: SURGERY

## 2018-12-17 NOTE — PROGRESS NOTES
Patient: Tesfaye Ang    YOB: 1975    Date: 12/17/2018    Primary Care Provider: Karlee Thomas MD    Chief Complaint   Patient presents with   • Hernia     Left inguinal       History:Patient is in the office today to follow up on abdominal CT that was completed on 2/13/2018. Patient has history of left groin pain and hernia repair in same area. Patient states pain with lifting and coughing. CT showed no evidence of acute intra-abdominal process.     It is interesting that the patient did have a previous left inguinal hernia repair in [place] approximately 7 years ago.  He does think that mesh was placed at that time.  He also had a recurrent left inguinal hernia repair approximately 3-4 years ago at [facility], he also thinks mesh was placed at that time.  He did have an episode of pneumonia 14 months ago and had a time period of fairly severe coughing, he has noticed left inguinal discomfort since then.  This discomfort is sharp in nature, worse with heavy lifting, relieved by lying down, not associated with any other symptomatology, nonradiating in nature.  He is on oxygen supplementation 24 hours per day and continues to smoke.    Review of the old records at [facility] did show evidence of a left inguinal hernia repair performed by Dr. Terry Billings in March 2016.  This was a open left inguinal hernia repair with mesh according to the Op Note.    The following portions of the patient's history were reviewed and updated as appropriate: allergies, current medications, past family history, past medical history, past social history, past surgical history and problem list.      Review of Systems   Constitutional: Negative for chills, fever and unexpected weight change.   HENT: Negative for trouble swallowing and voice change.    Eyes: Negative for visual disturbance.   Respiratory: Negative for apnea, cough, chest tightness, shortness of breath and  "wheezing.    Cardiovascular: Negative for chest pain, palpitations and leg swelling.   Gastrointestinal: Positive for abdominal pain. Negative for abdominal distention, anal bleeding, blood in stool, constipation, diarrhea, nausea, rectal pain and vomiting.   Endocrine: Negative for cold intolerance and heat intolerance.   Genitourinary: Negative for difficulty urinating, dysuria, flank pain, scrotal swelling and testicular pain.   Musculoskeletal: Negative for back pain, gait problem and joint swelling.   Skin: Negative for color change, rash and wound.   Neurological: Negative for dizziness, syncope, speech difficulty, weakness, numbness and headaches.   Hematological: Negative for adenopathy. Does not bruise/bleed easily.   Psychiatric/Behavioral: Negative for confusion. The patient is not nervous/anxious.        Vital Signs  Vitals:    12/17/18 0858   BP: 140/90   Pulse: 73   Temp: 98.8 °F (37.1 °C)   SpO2: 98%   Weight: 96.6 kg (213 lb)   Height: 167.6 cm (65.98\")       Allergies:  Allergies   Allergen Reactions   • Compazine [Prochlorperazine Edisylate] Anaphylaxis and Shortness Of Breath   • Gabapentin Other (See Comments)     seizures   • Toradol [Ketorolac Tromethamine] Hives and Shortness Of Breath   • Darvon [Propoxyphene] Other (See Comments) and Hives   • Vioxx [Rofecoxib] Other (See Comments)     Stomach bleeding  Stomach bleeding       Medications:    Current Outpatient Medications:   •  aluminum sulfate-calcium acetate (DOMEBORO) packet, Apply 1 packet topically to the appropriate area as directed 3 (Three) Times a Day., Disp: 100 each, Rfl: 2  •  aspirin 81 MG chewable tablet, Chew 1 tablet by mouth Daily., Disp: 30 tablet, Rfl: 0  •  atorvastatin (LIPITOR) 80 MG tablet, Take 1 tablet by mouth Every Night., Disp: 30 tablet, Rfl: 0  •  carvedilol (COREG) 25 MG tablet, Take 2 tablets by mouth 2 (Two) Times a Day With Meals., Disp: 360 tablet, Rfl: 4  •  chlorthalidone (HYGROTON) 25 MG tablet, Take 1 " tablet by mouth Daily., Disp: 90 tablet, Rfl: 4  •  doxycycline (VIBRAMYCIN) 100 MG capsule, Take 1 capsule by mouth 2 (Two) Times a Day for 7 days., Disp: 14 capsule, Rfl: 0  •  Fluticasone-Umeclidin-Vilant (TRELEGY ELLIPTA) 100-62.5-25 MCG/INH aerosol powder , Inhale 1 each Daily. Rinse mouth with water after use., Disp: 1 each, Rfl: 5  •  hydrALAZINE (APRESOLINE) 25 MG tablet, Take 1 tablet by mouth Every 8 (Eight) Hours., Disp: 270 tablet, Rfl: 4  •  ipratropium-albuterol (DUO-NEB) 0.5-2.5 mg/mL nebulizer, Take 3 mL by nebulization Every 4 (Four) Hours As Needed for Wheezing., Disp: 360 mL, Rfl: 3  •  isosorbide dinitrate (ISORDIL) 5 MG tablet, Take 2 tablets by mouth 3 (Three) Times a Day., Disp: 540 tablet, Rfl: 4  •  lisinopril (PRINIVIL,ZESTRIL) 40 MG tablet, Take 1 tablet by mouth Every 12 (Twelve) Hours., Disp: 180 tablet, Rfl: 4  •  Misc. Devices (PULSE OXIMETER DELUXE) misc, , Disp: , Rfl:   •  promethazine-dextromethorphan (PROMETHAZINE-DM) 6.25-15 MG/5ML syrup, Take 5 mL by mouth 4 (Four) Times a Day As Needed for Cough., Disp: 240 mL, Rfl: 0  •  ticagrelor (BRILINTA) 90 MG tablet tablet, Take 1 tablet by mouth 2 (Two) Times a Day., Disp: 16 tablet, Rfl: 0    Physical Exam:   General Appearance:    Alert, cooperative, in no acute distress   Head:    Normocephalic, without obvious abnormality, atraumatic   Lungs:     Clear to auscultation,respirations regular, even and                  unlabored    Heart:    Regular rhythm and normal rate, normal S1 and S2, no            murmur, no gallop, no rub, no click   Abdomen:     Normal bowel sounds, no masses, no organomegaly, soft        non-tender, non-distended, no guarding, no rebound                tenderness, there is slight left inguinal discomfort is certainly no evidence of palpable masses.  There are 2 separate left inguinal incisions well-healed    Extremities:   Moves all extremities well, no edema, no cyanosis, no             redness   Pulses:    Pulses palpable and equal bilaterally   Skin:   No bleeding, bruising or rash       Results Review:   I reviewed the patient's new clinical results.  I reviewed the patient's new imaging results and agree with the interpretation.  I reviewed the patient's other test results and agree with the interpretation     Assessment/Plan     1. Groin pain, chronic, left      In short, I did have a detailed and extensive discussion with the patient in the office today.  CT scan was reviewed and showed no evidence of recurrent hernia.  Previous ultrasound showed no evidence of recurrent hernia.  My examination today reveals no evidence of recurrent hernia.    The patient reports pain in the left groin for the past 14 months since his previous episode of pneumonia hospitalization and associated coughing, I have asked him to start on some Aleve or ibuprofen and to avoid heavy lifting, I would like to see him back in the office in one month for repeat physical examination.    We have contacted [facility], I did review the patient's old op note today in the office. He did have a previous left inguinal hernia repair in 2016 for recurrent inguinal hernia, this was done with mesh in a Jessica fashion.    Electronically signed by Alex Victoria MD  12/17/18

## 2019-01-08 ENCOUNTER — TELEPHONE (OUTPATIENT)
Dept: INTERNAL MEDICINE | Facility: CLINIC | Age: 44
End: 2019-01-08

## 2019-01-08 ENCOUNTER — HOSPITAL ENCOUNTER (EMERGENCY)
Facility: HOSPITAL | Age: 44
Discharge: HOME | End: 2019-01-08
Attending: EMERGENCY MEDICINE | Admitting: EMERGENCY MEDICINE

## 2019-01-08 VITALS
OXYGEN SATURATION: 99 % | HEIGHT: 66 IN | DIASTOLIC BLOOD PRESSURE: 139 MMHG | RESPIRATION RATE: 17 BRPM | SYSTOLIC BLOOD PRESSURE: 163 MMHG | HEART RATE: 86 BPM | WEIGHT: 194.8 LBS | TEMPERATURE: 97.5 F | BODY MASS INDEX: 31.31 KG/M2

## 2019-01-08 DIAGNOSIS — K08.89 PAIN, DENTAL: Primary | ICD-10-CM

## 2019-01-08 PROCEDURE — 99283 EMERGENCY DEPT VISIT LOW MDM: CPT

## 2019-01-08 RX ORDER — AMOXICILLIN 500 MG/1
500 CAPSULE ORAL 2 TIMES DAILY
Qty: 13 CAPSULE | Refills: 0 | Status: SHIPPED | OUTPATIENT
Start: 2019-01-08

## 2019-01-08 RX ORDER — AMOXICILLIN 500 MG/1
500 CAPSULE ORAL ONCE
Status: COMPLETED | OUTPATIENT
Start: 2019-01-08 | End: 2019-01-08

## 2019-01-08 RX ADMIN — AMOXICILLIN 500 MG: 500 CAPSULE ORAL at 17:18

## 2019-01-08 RX ADMIN — LIDOCAINE HYDROCHLORIDE 5 ML: 20 SOLUTION ORAL; TOPICAL at 17:18

## 2019-01-08 RX ADMIN — TOPICAL ANESTHETIC 1 SPRAY: 200 SPRAY DENTAL; PERIODONTAL at 17:18

## 2019-01-08 NOTE — ED PROVIDER NOTES
Subjective   History of Present Illness   43-year-old male presenting with 2 days of left upper dental pain with associated surrounding swelling.  Denies any fevers, chills, difficulty swallowing or breathing, or other specific symptoms.  He has not been able to find a dentist that takes his insurance to help with this known broken tooth in that area.    Review of Systems   HENT: Positive for dental problem.    All other systems reviewed and are negative.      Past Medical History:   Diagnosis Date   • Arthritis    • CHF (congestive heart failure) (CMS/[facility])    • Colon polyp    • COPD (chronic obstructive pulmonary disease) (CMS/[facility])    • Coronary artery disease    • Fractures    • Heart attack (CMS/HCC)    • Heart disease    • Heart failure (CMS/[facility])    • History of bilateral inguinal hernias    • Hx of transfusion of whole blood    • Hypertension    • Kidney stone    • Myocardial infarction (CMS/HCC)    • Obesity    • Seizures (CMS/[facility])     due to medication Gabapentin   • Stroke (CMS/[facility])     several TIA       Allergies   Allergen Reactions   • Compazine [Prochlorperazine Edisylate] Anaphylaxis and Shortness Of Breath   • Gabapentin Other (See Comments)     seizures   • Toradol [Ketorolac Tromethamine] Hives and Shortness Of Breath   • Darvon [Propoxyphene] Other (See Comments) and Hives   • Vioxx [Rofecoxib] Other (See Comments)     Stomach bleeding  Stomach bleeding       Past Surgical History:   Procedure Laterality Date   • APPENDECTOMY  05/2012   • CARDIAC CATHETERIZATION     • CARDIAC CATHETERIZATION N/A 10/17/2018    Procedure: Coronary angiography;  Surgeon: Teddy Casey MD;  Location: [facility] CATH INVASIVE LOCATION;  Service: Cardiology   • CARDIAC CATHETERIZATION N/A 10/17/2018    Procedure: Stent BRITNEY coronary;  Surgeon: Teddy Casey MD;  Location: [facility] CATH INVASIVE LOCATION;  Service: Cardiology   • CARDIAC CATHETERIZATION N/A 10/17/2018    Procedure: Left Heart Cath;  Surgeon: Jacinto  Teddy DILLARD MD;  Location: [facility] INVASIVE LOCATION;  Service: Cardiology   • CHOLECYSTECTOMY  2012   • COLON RESECTION     • HERNIA REPAIR  2016   • REVISION / TAKEDOWN COLOSTOMY         Family History   Problem Relation Age of Onset   • Heart attack Mother 56   • Hypertension Mother    • Migraines Mother    • Stroke Mother    • Heart attack Father 39   • Hypertension Father    • Arthritis Paternal Grandmother    • Arthritis Paternal Grandfather        Social History     Socioeconomic History   • Marital status:      Spouse name: Not on file   • Number of children: Not on file   • Years of education: Not on file   • Highest education level: Not on file   Tobacco Use   • Smoking status: Former Smoker     Packs/day: 0.25     Types: Cigarettes     Last attempt to quit: 2016     Years since quittin.4   • Smokeless tobacco: Never Used   Substance and Sexual Activity   • Alcohol use: No     Comment: STOPPED    • Drug use: No   • Sexual activity: Defer           Objective   Physical Exam   Constitutional: He is oriented to person, place, and time. He appears well-developed and well-nourished. No distress.   HENT:   Head: Normocephalic and atraumatic.   Left upper second molar appears broken with minimal surrounding erythema and swelling.   Eyes: Right eye exhibits no discharge. Left eye exhibits no discharge. No scleral icterus.   Neck: Normal range of motion. Neck supple. No tracheal deviation present.   Cardiovascular: Normal rate, regular rhythm, normal heart sounds and intact distal pulses. Exam reveals no gallop and no friction rub.   No murmur heard.  Pulmonary/Chest: Effort normal and breath sounds normal. No stridor. No respiratory distress. He has no wheezes. He has no rales.   Musculoskeletal: He exhibits no edema or deformity.   Neurological: He is alert and oriented to person, place, and time.   Skin: Skin is warm and dry. Capillary refill takes less than 2 seconds. No rash noted. He  is not diaphoretic. No erythema. No pallor.   Psychiatric: He has a normal mood and affect. His behavior is normal.   Nursing note and vitals reviewed.      Procedures           ED Course                  MDM   43-year-old male here with dental pain with likely dental abscess.  We'll start on antibiotics, give pain control, and discharged with information on fall while's clinic for outpatient reassessment. Discussed return to care precautions.       Final diagnoses:   Pain, dental            Alcides Bone MD  01/08/19 7962

## 2019-01-09 ENCOUNTER — TELEPHONE (OUTPATIENT)
Dept: INTERNAL MEDICINE | Facility: CLINIC | Age: 44
End: 2019-01-09

## 2019-01-09 NOTE — TELEPHONE ENCOUNTER
Our office in general does not send antibiotics without evaluating the patient. I suggest he try to get in with an extender, or [facility], or dentist.

## 2019-01-09 NOTE — TELEPHONE ENCOUNTER
Patient states that he had missed a call from [facility] and is requesting a return call. Patient state that he has a bad tooth and would like to know if doctor would be able to call an antibiotic in to the [facility] pharmacy. Please advise. Confirmed number is [phone].

## 2019-02-04 ENCOUNTER — TELEPHONE (OUTPATIENT)
Dept: SURGERY | Facility: CLINIC | Age: 44
End: 2019-02-04

## 2019-02-04 NOTE — TELEPHONE ENCOUNTER
Patient no showed for appointment with Dr Victoria . Called patient received message not accepting calls at this time. Called emergency contact,no answer,not accepting calls. No show letter mailed to patient.

## 2019-06-28 RX ORDER — AMLODIPINE BESYLATE 10 MG/1
TABLET ORAL
Qty: 90 TABLET | Refills: 1 | OUTPATIENT
Start: 2019-06-28

## 2019-07-22 ENCOUNTER — HOSPITAL ENCOUNTER (EMERGENCY)
Age: 44
Discharge: LEFT BEFORE DISCHARGE | End: 2019-07-22
Payer: MEDICARE

## 2019-07-22 ENCOUNTER — APPOINTMENT (OUTPATIENT)
Dept: GENERAL RADIOLOGY | Age: 44
End: 2019-07-22
Payer: MEDICARE

## 2019-07-22 VITALS
SYSTOLIC BLOOD PRESSURE: 201 MMHG | TEMPERATURE: 98.7 F | HEIGHT: 66 IN | HEART RATE: 86 BPM | RESPIRATION RATE: 18 BRPM | WEIGHT: 210 LBS | DIASTOLIC BLOOD PRESSURE: 130 MMHG | OXYGEN SATURATION: 96 % | BODY MASS INDEX: 33.75 KG/M2

## 2019-07-22 DIAGNOSIS — S83.91XA SPRAIN OF RIGHT KNEE, UNSPECIFIED LIGAMENT, INITIAL ENCOUNTER: Primary | ICD-10-CM

## 2019-07-22 DIAGNOSIS — I16.0 HYPERTENSIVE URGENCY: ICD-10-CM

## 2019-07-22 LAB
ANION GAP SERPL CALCULATED.3IONS-SCNC: 16 MEQ/L (ref 8–16)
BASOPHILS # BLD: 0.4 %
BASOPHILS ABSOLUTE: 0.1 THOU/MM3 (ref 0–0.1)
BUN BLDV-MCNC: 18 MG/DL (ref 7–22)
CALCIUM SERPL-MCNC: 9.2 MG/DL (ref 8.5–10.5)
CHLORIDE BLD-SCNC: 104 MEQ/L (ref 98–111)
CO2: 20 MEQ/L (ref 23–33)
CREAT SERPL-MCNC: 1.1 MG/DL (ref 0.4–1.2)
EKG ATRIAL RATE: 83 BPM
EKG P AXIS: 52 DEGREES
EKG P-R INTERVAL: 176 MS
EKG Q-T INTERVAL: 390 MS
EKG QRS DURATION: 82 MS
EKG QTC CALCULATION (BAZETT): 458 MS
EKG R AXIS: -38 DEGREES
EKG T AXIS: 110 DEGREES
EKG VENTRICULAR RATE: 83 BPM
EOSINOPHIL # BLD: 1.8 %
EOSINOPHILS ABSOLUTE: 0.3 THOU/MM3 (ref 0–0.4)
ERYTHROCYTE [DISTWIDTH] IN BLOOD BY AUTOMATED COUNT: 17.2 % (ref 11.5–14.5)
ERYTHROCYTE [DISTWIDTH] IN BLOOD BY AUTOMATED COUNT: 50.1 FL (ref 35–45)
GFR SERPL CREATININE-BSD FRML MDRD: 73 ML/MIN/1.73M2
GLUCOSE BLD-MCNC: 88 MG/DL (ref 70–108)
HCT VFR BLD CALC: 51 % (ref 42–52)
HEMOGLOBIN: 16.8 GM/DL (ref 14–18)
IMMATURE GRANS (ABS): 0.14 THOU/MM3 (ref 0–0.07)
IMMATURE GRANULOCYTES: 1 %
LYMPHOCYTES # BLD: 12.6 %
LYMPHOCYTES ABSOLUTE: 2 THOU/MM3 (ref 1–4.8)
MCH RBC QN AUTO: 28.8 PG (ref 26–33)
MCHC RBC AUTO-ENTMCNC: 32.9 GM/DL (ref 32.2–35.5)
MCV RBC AUTO: 87.3 FL (ref 80–94)
MONOCYTES # BLD: 4.8 %
MONOCYTES ABSOLUTE: 0.8 THOU/MM3 (ref 0.4–1.3)
NUCLEATED RED BLOOD CELLS: 0 /100 WBC
OSMOLALITY CALCULATION: 280.7 MOSMOL/KG (ref 275–300)
PLATELET # BLD: 196 THOU/MM3 (ref 130–400)
PMV BLD AUTO: 10.1 FL (ref 9.4–12.4)
POTASSIUM SERPL-SCNC: 4.2 MEQ/L (ref 3.5–5.2)
RBC # BLD: 5.84 MILL/MM3 (ref 4.7–6.1)
SEG NEUTROPHILS: 79.5 %
SEGMENTED NEUTROPHILS ABSOLUTE COUNT: 12.7 THOU/MM3 (ref 1.8–7.7)
SODIUM BLD-SCNC: 140 MEQ/L (ref 135–145)
TROPONIN T: < 0.01 NG/ML
WBC # BLD: 16 THOU/MM3 (ref 4.8–10.8)

## 2019-07-22 PROCEDURE — 93005 ELECTROCARDIOGRAM TRACING: CPT | Performed by: NURSE PRACTITIONER

## 2019-07-22 PROCEDURE — 6370000000 HC RX 637 (ALT 250 FOR IP): Performed by: NURSE PRACTITIONER

## 2019-07-22 PROCEDURE — 99284 EMERGENCY DEPT VISIT MOD MDM: CPT

## 2019-07-22 PROCEDURE — 84484 ASSAY OF TROPONIN QUANT: CPT

## 2019-07-22 PROCEDURE — 80048 BASIC METABOLIC PNL TOTAL CA: CPT

## 2019-07-22 PROCEDURE — 85025 COMPLETE CBC W/AUTO DIFF WBC: CPT

## 2019-07-22 PROCEDURE — 2709999900 HC NON-CHARGEABLE SUPPLY

## 2019-07-22 PROCEDURE — 73564 X-RAY EXAM KNEE 4 OR MORE: CPT

## 2019-07-22 PROCEDURE — 71046 X-RAY EXAM CHEST 2 VIEWS: CPT

## 2019-07-22 PROCEDURE — 36415 COLL VENOUS BLD VENIPUNCTURE: CPT

## 2019-07-22 RX ORDER — CARVEDILOL 25 MG/1
25 TABLET ORAL ONCE
Status: COMPLETED | OUTPATIENT
Start: 2019-07-22 | End: 2019-07-22

## 2019-07-22 RX ORDER — AMLODIPINE BESYLATE 10 MG/1
10 TABLET ORAL DAILY
COMMUNITY
End: 2019-07-29 | Stop reason: SDUPTHER

## 2019-07-22 RX ORDER — CARVEDILOL 25 MG/1
50 TABLET ORAL 2 TIMES DAILY WITH MEALS
COMMUNITY

## 2019-07-22 RX ORDER — AMLODIPINE BESYLATE 10 MG/1
10 TABLET ORAL ONCE
Status: COMPLETED | OUTPATIENT
Start: 2019-07-22 | End: 2019-07-22

## 2019-07-22 RX ORDER — LISINOPRIL 20 MG/1
20 TABLET ORAL ONCE
Status: COMPLETED | OUTPATIENT
Start: 2019-07-22 | End: 2019-07-22

## 2019-07-22 RX ORDER — NAPROXEN 500 MG/1
500 TABLET ORAL 2 TIMES DAILY
Qty: 60 TABLET | Refills: 0 | Status: SHIPPED | OUTPATIENT
Start: 2019-07-22 | End: 2019-08-12

## 2019-07-22 RX ORDER — LISINOPRIL 20 MG/1
20 TABLET ORAL 2 TIMES DAILY
COMMUNITY

## 2019-07-22 RX ORDER — CLONIDINE HYDROCHLORIDE 0.2 MG/1
0.2 TABLET ORAL ONCE
Status: COMPLETED | OUTPATIENT
Start: 2019-07-22 | End: 2019-07-22

## 2019-07-22 RX ADMIN — CARVEDILOL 25 MG: 25 TABLET, FILM COATED ORAL at 20:20

## 2019-07-22 RX ADMIN — AMLODIPINE BESYLATE 10 MG: 10 TABLET ORAL at 20:20

## 2019-07-22 RX ADMIN — LISINOPRIL 20 MG: 20 TABLET ORAL at 20:20

## 2019-07-22 RX ADMIN — CLONIDINE HYDROCHLORIDE 0.2 MG: 0.2 TABLET ORAL at 18:24

## 2019-07-22 SDOH — HEALTH STABILITY: MENTAL HEALTH: HOW OFTEN DO YOU HAVE A DRINK CONTAINING ALCOHOL?: NEVER

## 2019-07-22 ASSESSMENT — PAIN DESCRIPTION - PAIN TYPE: TYPE: ACUTE PAIN

## 2019-07-22 ASSESSMENT — ENCOUNTER SYMPTOMS
SHORTNESS OF BREATH: 0
NAUSEA: 0
COLOR CHANGE: 0
VOMITING: 0
DIARRHEA: 0
BACK PAIN: 0

## 2019-07-22 ASSESSMENT — PAIN SCALES - GENERAL: PAINLEVEL_OUTOF10: 5

## 2019-07-22 ASSESSMENT — PAIN DESCRIPTION - LOCATION: LOCATION: KNEE

## 2019-07-22 ASSESSMENT — PAIN DESCRIPTION - ORIENTATION: ORIENTATION: RIGHT

## 2019-07-22 NOTE — ED PROVIDER NOTES
daily    ASPIRIN 81 MG TABLET    Take 81 mg by mouth daily    CARVEDILOL (COREG) 25 MG TABLET    Take 25 mg by mouth 2 times daily (with meals)    LISINOPRIL (PRINIVIL;ZESTRIL) 20 MG TABLET    Take 20 mg by mouth daily       ALLERGIES     is allergic to compazine [prochlorperazine]; darvon [propoxyphene]; fiorinal [butalbital-aspirin-caffeine]; gabapentin; toradol [ketorolac tromethamine]; and vioxx [rofecoxib]. FAMILY HISTORY     has no family status information on file. family history is not on file. SOCIAL HISTORY      reports that he has been smoking cigarettes. He has been smoking about 0.50 packs per day. He has never used smokeless tobacco. He reports that he does not drink alcohol or use drugs. PHYSICAL EXAM     INITIAL VITALS:  height is 5' 6\" (1.676 m) and weight is 210 lb (95.3 kg). His oral temperature is 98.7 °F (37.1 °C). His blood pressure is 201/130 (abnormal) and his pulse is 86. His respiration is 18 and oxygen saturation is 96%. Physical Exam   Constitutional: He is oriented to person, place, and time. He appears well-developed and well-nourished. HENT:   Head: Normocephalic and atraumatic. Eyes: Pupils are equal, round, and reactive to light. Neck: Normal range of motion. Cardiovascular: Intact distal pulses. Pulmonary/Chest: Effort normal.   Musculoskeletal: Normal range of motion. Right hip: He exhibits normal range of motion, normal strength, no tenderness, no bony tenderness, no swelling, no crepitus, no deformity and no laceration. Right knee: He exhibits normal range of motion, no swelling, no ecchymosis, no deformity, no laceration, no erythema and no bony tenderness. Tenderness found. Left knee: He exhibits normal range of motion, no swelling, no ecchymosis, no deformity, no laceration and no erythema. No tenderness found. Right ankle: He exhibits normal range of motion, no swelling, no ecchymosis, no deformity and no laceration.  No tenderness. Neurological: He is alert and oriented to person, place, and time. Skin: Skin is warm and dry. No abrasion, no bruising, no ecchymosis and no laceration noted. No erythema. Nursing note and vitals reviewed. DIFFERENTIAL DIAGNOSIS:   Strain, sprain, fracture, contusion, dislocation, tendonitis, MCL or LCL injury    DIAGNOSTIC RESULTS     RADIOLOGY:non-plain filmimages(s) such as CT, Ultrasound and MRI are read by the radiologist.       XR CHEST STANDARD (2 VW)   Final Result    IMPRESSION:      Likely chronic changes in the left lung base. Superimposed infiltrate cannot be excluded. Blunted left costophrenic angle. Correlation with symptoms advised. **This report has been created using voice recognition software. It may contain minor errors which are inherent in voice recognition technology. **      Final report electronically signed by Dr. Maranda Adair on 7/22/2019 8:04 PM      XR KNEE RIGHT (MIN 4 VIEWS)   Final Result   No acute fracture or dislocation. **This report has been created using voice recognition software. It may contain minor errors which are inherent in voice recognition technology. **      Final report electronically signed by Dr. Maranda Adair on 7/22/2019 6:30 PM         EMERGENCY DEPARTMENT COURSE:   Vitals:    Vitals:    07/22/19 1806 07/22/19 1824 07/22/19 1937 07/22/19 2125   BP: (!) 225/137 (!) 227/140 (!) 209/139 (!) 201/130   Pulse: 96  89 86   Resp: 18  18    Temp: 98.7 °F (37.1 °C)      TempSrc: Oral      SpO2: 97%  96%    Weight: 210 lb (95.3 kg)      Height: 5' 6\" (1.676 m)          6:27 PM: The patient was seen and evaluated. I preformed the physical exam and discussed the plan of care with the patient. MDM:  Efforts to control the patient's blood pressure were unsuccessful. I did discuss this case with my attending physician we decided it would be best to admit the patient for blood pressure control however he declined.   Despite discussion with patient about reasons for and importance of admission and/or further testing, the patient refuses admission and/or any further testing and wants to leave against medical advice. The patient is alert and oriented times three, not altered or intoxicated in any fashion, and appears capable of making informed medical decisions. I explained plainly to the patient and any available family, the possible risks of leaving against medical advice, including worsening of medical condition that could result in their death, disability, or chronic vegetative state. They verbalize understanding to these risks and accept sole responsibility for leaving today. I did explain to them, that although they are leaving against medical advice today, they should not hesitate to return to the emergency room at any time should they change their mind, need re-evaluation or desire further care. CRITICAL CARE:   None      CONSULTS:  This case was discussed with Dr. Aaron Shafer, my attending physician. Who is in agreement with my plan of care. PROCEDURES:  ED PROCEDURE NOTE: SPLINTING/STRAPPING    The nurse applied ace wrap to the injured extremity of the patient. The area was examined post application and there was good alignment and good neurovascular function of the splinted/immobilized body part following the procedure. The patient tolerated the procedure well. Electronically verified by [facility]        FINAL IMPRESSION     1. Sprain of right knee, unspecified ligament, initial encounter    2.  Hypertensive urgency          DISPOSITION/PLAN   AMA    PATIENT REFERRED TO:  Bryan Camacho MD  [facility] [address] [place] [phone]    In 2 days      [address] [address] [phone]  Schedule an appointment as soon as possible for a visit         DISCHARGE MEDICATIONS:  New Prescriptions    NAPROXEN (NAPROSYN) 500 MG TABLET    Take 1 tablet by mouth 2 times daily

## 2019-07-22 NOTE — ED TRIAGE NOTES
Pt c/o falling three times yesterday and having right knee pain. Pt states he has had issues in the past with his right knee but has not had any surgeries on it.

## 2019-07-23 PROCEDURE — 93010 ELECTROCARDIOGRAM REPORT: CPT | Performed by: INTERNAL MEDICINE

## 2019-07-29 ENCOUNTER — APPOINTMENT (OUTPATIENT)
Dept: GENERAL RADIOLOGY | Age: 44
End: 2019-07-29
Payer: MEDICARE

## 2019-07-29 ENCOUNTER — HOSPITAL ENCOUNTER (EMERGENCY)
Age: 44
Discharge: HOME | End: 2019-07-29
Payer: MEDICARE

## 2019-07-29 VITALS
WEIGHT: 215 LBS | BODY MASS INDEX: 34.55 KG/M2 | DIASTOLIC BLOOD PRESSURE: 123 MMHG | HEIGHT: 66 IN | RESPIRATION RATE: 18 BRPM | OXYGEN SATURATION: 96 % | HEART RATE: 72 BPM | TEMPERATURE: 97.8 F | SYSTOLIC BLOOD PRESSURE: 173 MMHG

## 2019-07-29 DIAGNOSIS — I10 ESSENTIAL HYPERTENSION: ICD-10-CM

## 2019-07-29 DIAGNOSIS — M25.561 ACUTE PAIN OF RIGHT KNEE: Primary | ICD-10-CM

## 2019-07-29 PROCEDURE — L1830 KO IMMOB CANVAS LONG PRE OTS: HCPCS

## 2019-07-29 PROCEDURE — 73564 X-RAY EXAM KNEE 4 OR MORE: CPT

## 2019-07-29 PROCEDURE — 99283 EMERGENCY DEPT VISIT LOW MDM: CPT

## 2019-07-29 PROCEDURE — 6370000000 HC RX 637 (ALT 250 FOR IP): Performed by: PHYSICIAN ASSISTANT

## 2019-07-29 RX ORDER — PREDNISONE 20 MG/1
20 TABLET ORAL 2 TIMES DAILY
Qty: 10 TABLET | Refills: 0 | Status: SHIPPED | OUTPATIENT
Start: 2019-07-29 | End: 2019-08-03

## 2019-07-29 RX ORDER — TRAMADOL HYDROCHLORIDE 50 MG/1
50 TABLET ORAL EVERY 6 HOURS PRN
Qty: 12 TABLET | Refills: 0 | Status: SHIPPED | OUTPATIENT
Start: 2019-07-29 | End: 2019-08-01

## 2019-07-29 RX ORDER — TRAMADOL HYDROCHLORIDE 50 MG/1
50 TABLET ORAL ONCE
Status: COMPLETED | OUTPATIENT
Start: 2019-07-29 | End: 2019-07-29

## 2019-07-29 RX ORDER — HYDROCODONE BITARTRATE AND ACETAMINOPHEN 5; 325 MG/1; MG/1
1 TABLET ORAL ONCE
Status: COMPLETED | OUTPATIENT
Start: 2019-07-29 | End: 2019-07-29

## 2019-07-29 RX ORDER — AMLODIPINE BESYLATE 10 MG/1
10 TABLET ORAL DAILY
Status: DISCONTINUED | OUTPATIENT
Start: 2019-07-29 | End: 2019-07-29 | Stop reason: HOSPADM

## 2019-07-29 RX ORDER — AMLODIPINE BESYLATE 10 MG/1
10 TABLET ORAL DAILY
Qty: 30 TABLET | Refills: 0 | Status: SHIPPED | OUTPATIENT
Start: 2019-07-29 | End: 2019-08-12 | Stop reason: SDUPTHER

## 2019-07-29 RX ORDER — ACETAMINOPHEN 500 MG
500 TABLET ORAL EVERY 6 HOURS PRN
Qty: 120 TABLET | Refills: 0 | Status: SHIPPED | OUTPATIENT
Start: 2019-07-29 | End: 2019-07-29

## 2019-07-29 RX ADMIN — AMLODIPINE BESYLATE 10 MG: 10 TABLET ORAL at 11:55

## 2019-07-29 RX ADMIN — HYDROCODONE BITARTRATE AND ACETAMINOPHEN 1 TABLET: 5; 325 TABLET ORAL at 12:36

## 2019-07-29 RX ADMIN — TRAMADOL HYDROCHLORIDE 50 MG: 50 TABLET, FILM COATED ORAL at 11:24

## 2019-07-29 ASSESSMENT — ENCOUNTER SYMPTOMS
WHEEZING: 0
NAUSEA: 0
RHINORRHEA: 0
SORE THROAT: 0
ABDOMINAL PAIN: 0
EYE DISCHARGE: 0
BACK PAIN: 0
DIARRHEA: 0
SHORTNESS OF BREATH: 0
COUGH: 0
VOMITING: 0
EYE REDNESS: 0

## 2019-07-29 ASSESSMENT — PAIN DESCRIPTION - LOCATION
LOCATION: KNEE
LOCATION: KNEE

## 2019-07-29 ASSESSMENT — PAIN SCALES - GENERAL
PAINLEVEL_OUTOF10: 4

## 2019-07-29 ASSESSMENT — PAIN DESCRIPTION - ORIENTATION
ORIENTATION: RIGHT
ORIENTATION: RIGHT

## 2019-07-29 ASSESSMENT — PAIN DESCRIPTION - DESCRIPTORS: DESCRIPTORS: SHARP;STABBING

## 2019-07-29 ASSESSMENT — PAIN DESCRIPTION - FREQUENCY
FREQUENCY: CONTINUOUS
FREQUENCY: CONTINUOUS

## 2019-07-29 ASSESSMENT — PAIN DESCRIPTION - PAIN TYPE
TYPE: ACUTE PAIN
TYPE: ACUTE PAIN

## 2019-07-29 NOTE — ED TRIAGE NOTES
Pt presents to the ED today for R knee pain. Pt states he was seen in the ED a week ago for the same thing.

## 2019-07-29 NOTE — ED PROVIDER NOTES
Negative for discharge and redness. Respiratory: Negative for cough, shortness of breath and wheezing. Cardiovascular: Negative for chest pain and palpitations. Gastrointestinal: Negative for abdominal pain, constipation, diarrhea, nausea and vomiting. Genitourinary: Negative for difficulty urinating and dysuria. Musculoskeletal: Positive for arthralgias (right knee pain). Negative for back pain, joint swelling, myalgias, neck pain and neck stiffness. Skin: Negative for color change, pallor and rash. Neurological: Negative for dizziness, syncope, weakness, light-headedness, numbness and headaches. Hematological: Negative for adenopathy. Psychiatric/Behavioral: Negative for agitation, confusion, dysphoric mood and suicidal ideas. The patient is not nervous/anxious. PAST MEDICAL HISTORY    has a past medical history of Hypertension. SURGICAL HISTORY      has a past surgical history that includes hernia repair; Small intestine surgery; and Coronary angioplasty with stent. CURRENT MEDICATIONS       Discharge Medication List as of 7/29/2019 12:34 PM      CONTINUE these medications which have NOT CHANGED    Details   carvedilol (COREG) 25 MG tablet Take 25 mg by mouth 2 times daily (with meals)Historical Med      aspirin 81 MG tablet Take 81 mg by mouth dailyHistorical Med      lisinopril (PRINIVIL;ZESTRIL) 20 MG tablet Take 20 mg by mouth dailyHistorical Med      naproxen (NAPROSYN) 500 MG tablet Take 1 tablet by mouth 2 times daily, Disp-60 tablet, R-0Print             ALLERGIES     is allergic to compazine [prochlorperazine]; darvon [propoxyphene]; fiorinal [butalbital-aspirin-caffeine]; gabapentin; toradol [ketorolac tromethamine]; and vioxx [rofecoxib]. FAMILY HISTORY     has no family status information on file. family history is not on file. SOCIAL HISTORY      reports that he has been smoking cigarettes. He has been smoking about 0.50 packs per day.  He has never used

## 2019-07-31 ASSESSMENT — ENCOUNTER SYMPTOMS
CONSTIPATION: 0
COLOR CHANGE: 0

## 2019-08-12 ENCOUNTER — OFFICE VISIT (OUTPATIENT)
Dept: FAMILY MEDICINE CLINIC | Age: 44
End: 2019-08-12
Payer: MEDICARE

## 2019-08-12 VITALS
BODY MASS INDEX: 34.91 KG/M2 | SYSTOLIC BLOOD PRESSURE: 160 MMHG | DIASTOLIC BLOOD PRESSURE: 94 MMHG | WEIGHT: 216.3 LBS | OXYGEN SATURATION: 97 % | HEART RATE: 93 BPM | RESPIRATION RATE: 20 BRPM

## 2019-08-12 DIAGNOSIS — I10 ESSENTIAL HYPERTENSION: ICD-10-CM

## 2019-08-12 DIAGNOSIS — E78.00 PURE HYPERCHOLESTEROLEMIA: ICD-10-CM

## 2019-08-12 DIAGNOSIS — R10.31 RIGHT GROIN PAIN: ICD-10-CM

## 2019-08-12 DIAGNOSIS — Z13.220 SCREENING FOR LIPID DISORDERS: ICD-10-CM

## 2019-08-12 DIAGNOSIS — M25.561 CHRONIC PAIN OF RIGHT KNEE: ICD-10-CM

## 2019-08-12 DIAGNOSIS — G89.29 CHRONIC PAIN OF RIGHT KNEE: ICD-10-CM

## 2019-08-12 DIAGNOSIS — I25.10 CORONARY ARTERY DISEASE INVOLVING NATIVE CORONARY ARTERY OF NATIVE HEART WITHOUT ANGINA PECTORIS: ICD-10-CM

## 2019-08-12 DIAGNOSIS — J43.2 CENTRILOBULAR EMPHYSEMA (HCC): Primary | ICD-10-CM

## 2019-08-12 PROCEDURE — G8598 ASA/ANTIPLAT THER USED: HCPCS | Performed by: NURSE PRACTITIONER

## 2019-08-12 PROCEDURE — 3023F SPIROM DOC REV: CPT | Performed by: NURSE PRACTITIONER

## 2019-08-12 PROCEDURE — G8417 CALC BMI ABV UP PARAM F/U: HCPCS | Performed by: NURSE PRACTITIONER

## 2019-08-12 PROCEDURE — G8427 DOCREV CUR MEDS BY ELIG CLIN: HCPCS | Performed by: NURSE PRACTITIONER

## 2019-08-12 PROCEDURE — 4004F PT TOBACCO SCREEN RCVD TLK: CPT | Performed by: NURSE PRACTITIONER

## 2019-08-12 PROCEDURE — G8926 SPIRO NO PERF OR DOC: HCPCS | Performed by: NURSE PRACTITIONER

## 2019-08-12 PROCEDURE — 99204 OFFICE O/P NEW MOD 45 MIN: CPT | Performed by: NURSE PRACTITIONER

## 2019-08-12 RX ORDER — ALBUTEROL SULFATE 90 UG/1
2 AEROSOL, METERED RESPIRATORY (INHALATION) 4 TIMES DAILY PRN
Qty: 1 INHALER | Refills: 1 | Status: SHIPPED | OUTPATIENT
Start: 2019-08-12

## 2019-08-12 RX ORDER — HYDROCHLOROTHIAZIDE 12.5 MG/1
12.5 TABLET ORAL DAILY
Qty: 90 TABLET | Refills: 3 | Status: SHIPPED | OUTPATIENT
Start: 2019-08-12

## 2019-08-12 RX ORDER — AMLODIPINE BESYLATE 10 MG/1
10 TABLET ORAL DAILY
Qty: 90 TABLET | Refills: 3 | Status: SHIPPED | OUTPATIENT
Start: 2019-08-12 | End: 2019-09-11

## 2019-08-12 RX ORDER — ATORVASTATIN CALCIUM 20 MG/1
20 TABLET, FILM COATED ORAL DAILY
Qty: 90 TABLET | Refills: 3 | Status: SHIPPED | OUTPATIENT
Start: 2019-08-12

## 2019-08-12 NOTE — PROGRESS NOTES
CP    Discussed use, benefit, and side effects of prescribed medications. Barriers tomedication compliance addressed. All patient questions answered. Pt voiced understanding. Return in about 3 months (around 11/12/2019).     Orders Placed This Encounter   Procedures    US SCROTUM AND TESTICLES     Standing Status:   Future     Standing Expiration Date:   8/12/2020    Comprehensive Metabolic Panel     Standing Status:   Future     Standing Expiration Date:   8/11/2020    Lipid Panel     Standing Status:   Future     Standing Expiration Date:   8/11/2020     Order Specific Question:   Is Patient Fasting?/# of Hours     Answer:   requires 12 hour fast    CBC Auto Differential     Standing Status:   Future     Standing Expiration Date:   8/11/2020   Tremayne Jenkins MD, Cardiology, [facility]     Referral Priority:   Routine     Referral Type:   Eval and Treat     Referral Reason:   Specialty Services Required     Referred to Provider:   Lavinia Bedolla MD     Requested Specialty:   Interventional Cardiology     Number of Visits Requested:   [address] [place] [address] Silvia Molina MD, Pulmonology, [facility] [facility]     Referral Priority:   Routine     Referral Type:   Eval and Treat     Referral Reason:   Specialty Services Required     Referred to Provider:   Shira Marina MD     Requested Specialty:   Pulmonology     Number of Visits Requested:   [facility], Phil Calderon MD, Pain Medicine, [address] [facility]     Referral Priority:   Routine     Referral Type:   Eval and Treat     Referral Reason:   Specialty Services Required     Referred to Provider:   Nelli Osuna MD     Requested Specialty:   Pain Medicine     Number of Visits Requested:   1     Orders Placed This Encounter   Medications    hydrochlorothiazide (HYDRODIURIL) 12.5 MG tablet     Sig: Take 1 tablet by mouth daily     Dispense:  90 tablet     Refill:  3    amLODIPine (NORVASC) 10 MG tablet     Sig: Take 1 tablet by mouth daily

## 2019-08-13 ENCOUNTER — TELEPHONE (OUTPATIENT)
Dept: FAMILY MEDICINE CLINIC | Age: 44
End: 2019-08-13

## 2019-08-14 ASSESSMENT — ENCOUNTER SYMPTOMS
DIARRHEA: 0
NAUSEA: 0
CONSTIPATION: 0
WHEEZING: 0
ABDOMINAL PAIN: 0
STRIDOR: 0
COUGH: 0
SHORTNESS OF BREATH: 0
BACK PAIN: 1
ABDOMINAL DISTENTION: 0
COLOR CHANGE: 0
SINUS PRESSURE: 0
SORE THROAT: 0
CHEST TIGHTNESS: 0
VOMITING: 0

## 2019-08-15 ENCOUNTER — APPOINTMENT (OUTPATIENT)
Dept: GENERAL RADIOLOGY | Age: 44
End: 2019-08-15
Payer: MEDICARE

## 2019-08-15 ENCOUNTER — HOSPITAL ENCOUNTER (EMERGENCY)
Age: 44
Discharge: HOME | End: 2019-08-15
Attending: EMERGENCY MEDICINE
Payer: MEDICARE

## 2019-08-15 VITALS
WEIGHT: 215 LBS | HEART RATE: 104 BPM | RESPIRATION RATE: 16 BRPM | TEMPERATURE: 98.2 F | OXYGEN SATURATION: 94 % | BODY MASS INDEX: 34.55 KG/M2 | DIASTOLIC BLOOD PRESSURE: 136 MMHG | HEIGHT: 66 IN | SYSTOLIC BLOOD PRESSURE: 193 MMHG
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
## 2019-08-15 DIAGNOSIS — M25.551 RIGHT HIP PAIN: ICD-10-CM

## 2019-08-15 PROCEDURE — 96374 THER/PROPH/DIAG INJ IV PUSH: CPT

## 2019-08-15 PROCEDURE — 6360000002 HC RX W HCPCS: Performed by: EMERGENCY MEDICINE

## 2019-08-15 PROCEDURE — 99283 EMERGENCY DEPT VISIT LOW MDM: CPT

## 2019-08-15 PROCEDURE — 2580000003 HC RX 258: Performed by: EMERGENCY MEDICINE

## 2019-08-15 PROCEDURE — 73502 X-RAY EXAM HIP UNI 2-3 VIEWS: CPT

## 2019-08-15 PROCEDURE — 72170 X-RAY EXAM OF PELVIS: CPT

## 2019-08-15 RX ORDER — 0.9 % SODIUM CHLORIDE 0.9 %
1000 INTRAVENOUS SOLUTION INTRAVENOUS ONCE
Status: COMPLETED | OUTPATIENT
Start: 2019-08-15 | End: 2019-08-15

## 2019-08-15 RX ORDER — FENTANYL CITRATE 50 UG/ML
50 INJECTION, SOLUTION INTRAMUSCULAR; INTRAVENOUS ONCE
Status: COMPLETED | OUTPATIENT
Start: 2019-08-15 | End: 2019-08-15

## 2019-08-15 RX ORDER — OXYCODONE HYDROCHLORIDE AND ACETAMINOPHEN 5; 325 MG/1; MG/1
1 TABLET ORAL EVERY 6 HOURS PRN
Qty: 20 TABLET | Refills: 0 | Status: SHIPPED | OUTPATIENT
Start: 2019-08-15 | End: 2019-08-20

## 2019-08-15 RX ORDER — OXYCODONE HYDROCHLORIDE AND ACETAMINOPHEN 5; 325 MG/1; MG/1
1 TABLET ORAL ONCE
Status: DISCONTINUED | OUTPATIENT
Start: 2019-08-15 | End: 2019-08-15 | Stop reason: HOSPADM

## 2019-08-15 RX ADMIN — FENTANYL CITRATE 50 MCG: 50 INJECTION, SOLUTION INTRAMUSCULAR; INTRAVENOUS at 16:37

## 2019-08-15 RX ADMIN — SODIUM CHLORIDE 1000 ML: 9 INJECTION, SOLUTION INTRAVENOUS at 16:37

## 2019-08-15 ASSESSMENT — ENCOUNTER SYMPTOMS
NAUSEA: 0
SHORTNESS OF BREATH: 0
SINUS PAIN: 0
CONSTIPATION: 0
DIARRHEA: 0
CHEST TIGHTNESS: 0
EYE PAIN: 0
BLOOD IN STOOL: 0
COUGH: 0
RECTAL PAIN: 0
SINUS PRESSURE: 0
BACK PAIN: 0
ABDOMINAL PAIN: 0

## 2019-08-15 ASSESSMENT — PAIN DESCRIPTION - PAIN TYPE
TYPE: ACUTE PAIN
TYPE: ACUTE PAIN

## 2019-08-15 ASSESSMENT — PAIN DESCRIPTION - ORIENTATION
ORIENTATION: RIGHT
ORIENTATION: RIGHT

## 2019-08-15 ASSESSMENT — PAIN DESCRIPTION - LOCATION
LOCATION: HIP
LOCATION: HIP;KNEE

## 2019-08-15 ASSESSMENT — PAIN SCALES - GENERAL
PAINLEVEL_OUTOF10: 8
PAINLEVEL_OUTOF10: 9
PAINLEVEL_OUTOF10: 5

## 2019-08-15 NOTE — ED PROVIDER NOTES
SURGERY      SMALL INTESTINE SURGERY               Current Facility-Administered Medications:     0.9 % sodium chloride bolus, 1,000 mL, Intravenous, Once, Haresh Hwang MD, Last Rate: 1,000 mL/hr at 08/15/19 1637, 1,000 mL at 08/15/19 1637    oxyCODONE-acetaminophen (PERCOCET) 5-325 MG per tablet 1 tablet, 1 tablet, Oral, Once, Haresh Hwang MD    Current Outpatient Medications:     oxyCODONE-acetaminophen (PERCOCET) 5-325 MG per tablet, Take 1 tablet by mouth every 6 hours as needed for Pain for up to 5 days. Intended supply: 5 days.  Take lowest dose possible to manage pain, Disp: 20 tablet, Rfl: 0    hydrochlorothiazide (HYDRODIURIL) 12.5 MG tablet, Take 1 tablet by mouth daily, Disp: 90 tablet, Rfl: 3    amLODIPine (NORVASC) 10 MG tablet, Take 1 tablet by mouth daily, Disp: 90 tablet, Rfl: 3    atorvastatin (LIPITOR) 20 MG tablet, Take 1 tablet by mouth daily, Disp: 90 tablet, Rfl: 3    Fluticasone-Umeclidin-Vilant 100-62.5-25 MCG/INH AEPB, Inhale 1 puff into the lungs daily, Disp: 3 each, Rfl: 3    albuterol sulfate  (90 Base) MCG/ACT inhaler, Inhale 2 puffs into the lungs 4 times daily as needed for Wheezing, Disp: 1 Inhaler, Rfl: 1    carvedilol (COREG) 25 MG tablet, Take 50 mg by mouth 2 times daily (with meals) , Disp: , Rfl:     aspirin 81 MG tablet, Take 81 mg by mouth daily, Disp: , Rfl:     lisinopril (PRINIVIL;ZESTRIL) 20 MG tablet, Take 20 mg by mouth 2 times daily , Disp: , Rfl:       Social History     Social History Narrative    Not on file     Social History     Tobacco Use    Smoking status: Current Every Day Smoker     Packs/day: 1.00     Types: Cigarettes    Smokeless tobacco: Never Used   Substance Use Topics    Alcohol use: Never     Frequency: Never    Drug use: Never     Allergies   Allergen Reactions    Compazine [Prochlorperazine]     Darvon [Propoxyphene]     Fiorinal [Butalbital-Aspirin-Caffeine]     Gabapentin     Toradol [Ketorolac Tromethamine]     Vioxx [Rofecoxib]            Family History   Problem Relation Age of Onset    Heart Attack Father            Previous records reviewed: The patient was in the ED on 07/22/19 and 07/29/19 for chronic right knee pain from a known meniscal injury from high school. Hernandez states he has sprained his right hip in the past.       Vitals:    08/15/19 1632   BP: (!) 180/134   Pulse: 113   Resp: 18   Temp:    SpO2: 93%     Vital signs and nursing assessment reviewed and normal, with the exception of tachycardia and hypertension . Pulsoximetry is normal per my interpretation. Physical Exam   Constitutional: He is oriented to person, place, and time. He appears well-developed and well-nourished. No distress. HENT:   Head: Normocephalic and atraumatic. Right Ear: External ear normal.   Left Ear: External ear normal.   Nose: Nose normal.   Eyes: Pupils are equal, round, and reactive to light. Conjunctivae and EOM are normal.   Neck: Neck supple. Cardiovascular: Normal rate, regular rhythm, normal heart sounds and intact distal pulses. Exam reveals no gallop and no friction rub. No murmur heard. Pulmonary/Chest: Effort normal and breath sounds normal. No stridor. No respiratory distress. He has no wheezes. He has no rales. Abdominal: Soft. Bowel sounds are normal. He exhibits no distension and no mass. There is no tenderness. There is no rebound and no guarding. Musculoskeletal: He exhibits no edema. Right hip: He exhibits tenderness. Exquisitely tender on the right hip. Normal distal pulses. Neurological: He is alert and oriented to person, place, and time. No cranial nerve deficit. Skin: Skin is warm and dry. Laparotomy and thoracotomy scar. Psychiatric: He has a normal mood and affect. His behavior is normal.   Nursing note and vitals reviewed. MDM  Initial Assessment: Fall. Right hip injury. Plan: IV line. Pain control. IV fluids. X-ray.       Labs Reviewed - No data to display      Medications   0.9 % sodium chloride bolus (1,000 mLs Intravenous New Bag 8/15/19 1637)   oxyCODONE-acetaminophen (PERCOCET) 5-325 MG per tablet 1 tablet (has no administration in time range)   fentaNYL (SUBLIMAZE) injection 50 mcg (50 mcg Intravenous Given 8/15/19 1637)         XR HIP RIGHT (2-3 VIEWS)   Final Result   Normal hip. **This report has been created using voice recognition software. It may contain minor errors which are inherent in voice recognition technology. **      Final report electronically signed by Dr. Monique Miugel on 8/15/2019 4:26 PM      XR PELVIS (1-2 VIEWS)    (Results Pending)         Final diagnoses:   Fall, initial encounter   Right hip pain         ED Course as of Aug 15 1658   Thu Aug 15, 2019   1636 Valuated patient, currently getting the fentanyl IV at this moment. Informed him and his girlfriend now in the room of x-ray results. Will reevaluate after analgesia for possible discharge. [DT]   3904 Patient feeling better after fentanyl. We will give an additional dose of analgesia and obtain pelvic x-ray. The result is normal and pain continues improving will attempt walking for possible discharge. [DT]      ED Course User Index  [DT] Anneliese Faust MD         New Prescriptions    OXYCODONE-ACETAMINOPHEN (PERCOCET) 5-325 MG PER TABLET    Take 1 tablet by mouth every 6 hours as needed for Pain for up to 5 days. Intended supply: 5 days. Take lowest dose possible to manage pain         Condition: condition: good      Dispo: Transfer of care to Dr. Daniele Leon, pending pelvis X ray and additional analgesia. Curently getting IV fluids. If X ray is normal, will attempt ambulation for possible d/c. I attest that this documentation has been prepared under my direction and in the presence of Ms. Ana Laura Barnett (ED scribe).     Electronically Signed: Anneliese Faust, 08/15/19, 4:58 PM    I, Anneliese Faust, attest that the elvis's documentation has been prepared under my direction and in my presence, and was personally reviewed by me. I confirmed that the note accurately reflects the work and medical decision making performed by me.         Jennifer Araiza MD  08/15/19 0264

## 2019-08-15 NOTE — ED NOTES
Pt ambulated in [facility] to bathroom with crutches with standby assist. Pt stopped twice on walk back to room due to increasing pain. Pt returned to bed with staff assist. Call light within reach, will monitor.      Gustavo Mondragon RN  08/15/19 [id number]

## 2019-08-19 ENCOUNTER — TELEPHONE (OUTPATIENT)
Dept: FAMILY MEDICINE CLINIC | Age: 44
End: 2019-08-19

## 2019-08-21 ENCOUNTER — TELEPHONE (OUTPATIENT)
Dept: FAMILY MEDICINE CLINIC | Age: 44
End: 2019-08-21

## 2019-10-16 ENCOUNTER — TELEPHONE (OUTPATIENT)
Dept: CARDIOLOGY CLINIC | Age: 44
End: 2019-10-16

## 2020-02-24 DIAGNOSIS — I10 ESSENTIAL HYPERTENSION: Chronic | ICD-10-CM

## 2020-02-26 RX ORDER — LISINOPRIL 40 MG/1
TABLET ORAL
Qty: 180 TABLET | Refills: 4 | OUTPATIENT
Start: 2020-02-26

## 2020-02-26 RX ORDER — CARVEDILOL 25 MG/1
TABLET ORAL
Qty: 360 TABLET | Refills: 4 | OUTPATIENT
Start: 2020-02-26

## 2020-05-29 ENCOUNTER — TELEPHONE (OUTPATIENT)
Dept: INTERNAL MEDICINE | Facility: CLINIC | Age: 45
End: 2020-05-29

## 2020-05-29 ENCOUNTER — TELEPHONE (OUTPATIENT)
Dept: CARDIOLOGY | Facility: CLINIC | Age: 45
End: 2020-05-29

## 2020-05-29 NOTE — TELEPHONE ENCOUNTER
Cannot give a statement due to Pt not being seen since 2018. Pt already has his records and was told to set up care with [place] cardiologist [place]. Pt states verbal understanding

## 2020-05-29 NOTE — TELEPHONE ENCOUNTER
PT NEEDS A STATEMENT FROM DR CANSECO.  STATING THAT IN THE PT'S LAST APPT,  DID HE( DR CANSECO ) EXPECT PT'S HEART PROBLEMS TO GET BETTER OR WORSE?.....PT LEFT PHONE # FOR QUESTIONS.

## (undated) DEVICE — RUNTHROUGH NS EXTRA FLOPPY PTCA GUIDEWIRE: Brand: RUNTHROUGH

## (undated) DEVICE — RADIFOCUS OPTITORQUE ANGIOGRAPHIC CATHETER: Brand: OPTITORQUE

## (undated) DEVICE — ELECTRD PAD DEFIB A/

## (undated) DEVICE — GUIDE CATHETER: Brand: MACH1™

## (undated) DEVICE — GLIDESHEATH SLENDER STAINLESS STEEL KIT: Brand: GLIDESHEATH SLENDER

## (undated) DEVICE — CATH F6 ST JR 4 100CM: Brand: SUPERTORQUE

## (undated) DEVICE — GW INQWIRE FC PTFE STD J/1.5 .035 260

## (undated) DEVICE — TR BAND RADIAL ARTERY COMPRESSION DEVICE: Brand: TR BAND